# Patient Record
Sex: FEMALE | Race: BLACK OR AFRICAN AMERICAN | Employment: FULL TIME | URBAN - METROPOLITAN AREA
[De-identification: names, ages, dates, MRNs, and addresses within clinical notes are randomized per-mention and may not be internally consistent; named-entity substitution may affect disease eponyms.]

---

## 2017-01-13 ENCOUNTER — OFFICE VISIT (OUTPATIENT)
Dept: INTERNAL MEDICINE CLINIC | Age: 57
End: 2017-01-13

## 2017-01-13 VITALS
SYSTOLIC BLOOD PRESSURE: 166 MMHG | HEART RATE: 84 BPM | DIASTOLIC BLOOD PRESSURE: 130 MMHG | HEIGHT: 68 IN | BODY MASS INDEX: 44.41 KG/M2 | WEIGHT: 293 LBS

## 2017-01-13 DIAGNOSIS — E08.21 DIABETES MELLITUS DUE TO UNDERLYING CONDITION WITH DIABETIC NEPHROPATHY, WITH LONG-TERM CURRENT USE OF INSULIN (HCC): Primary | ICD-10-CM

## 2017-01-13 DIAGNOSIS — E78.5 HYPERLIPIDEMIA, UNSPECIFIED HYPERLIPIDEMIA TYPE: ICD-10-CM

## 2017-01-13 DIAGNOSIS — G89.4 CHRONIC PAIN SYNDROME: ICD-10-CM

## 2017-01-13 DIAGNOSIS — J20.9 ACUTE BRONCHITIS, UNSPECIFIED ORGANISM: ICD-10-CM

## 2017-01-13 DIAGNOSIS — Z79.4 DIABETES MELLITUS DUE TO UNDERLYING CONDITION WITH DIABETIC NEPHROPATHY, WITH LONG-TERM CURRENT USE OF INSULIN (HCC): Primary | ICD-10-CM

## 2017-01-13 DIAGNOSIS — I10 ESSENTIAL HYPERTENSION: ICD-10-CM

## 2017-01-13 DIAGNOSIS — J45.20 MILD INTERMITTENT ASTHMA WITHOUT COMPLICATION: ICD-10-CM

## 2017-01-13 PROCEDURE — 99204 OFFICE O/P NEW MOD 45 MIN: CPT | Performed by: INTERNAL MEDICINE

## 2017-01-13 RX ORDER — AMLODIPINE BESYLATE 10 MG/1
10 TABLET ORAL DAILY
Qty: 30 TABLET | Refills: 2 | Status: SHIPPED | OUTPATIENT
Start: 2017-01-13 | End: 2017-09-29 | Stop reason: SDUPTHER

## 2017-01-13 RX ORDER — AMOXICILLIN 500 MG/1
500 CAPSULE ORAL 3 TIMES DAILY
Qty: 30 CAPSULE | Refills: 0 | Status: SHIPPED | OUTPATIENT
Start: 2017-01-13 | End: 2017-01-23 | Stop reason: ALTCHOICE

## 2017-01-13 ASSESSMENT — ENCOUNTER SYMPTOMS
BACK PAIN: 1
ABDOMINAL PAIN: 0
NAUSEA: 0
VOMITING: 0
CHEST TIGHTNESS: 0
COUGH: 1
WHEEZING: 0
SHORTNESS OF BREATH: 0
COLOR CHANGE: 0

## 2017-01-13 ASSESSMENT — PATIENT HEALTH QUESTIONNAIRE - PHQ9
2. FEELING DOWN, DEPRESSED OR HOPELESS: 0
1. LITTLE INTEREST OR PLEASURE IN DOING THINGS: 0
SUM OF ALL RESPONSES TO PHQ9 QUESTIONS 1 & 2: 0
SUM OF ALL RESPONSES TO PHQ QUESTIONS 1-9: 0

## 2017-01-18 ENCOUNTER — OFFICE VISIT (OUTPATIENT)
Dept: INTERNAL MEDICINE | Age: 57
End: 2017-01-18

## 2017-01-20 ENCOUNTER — TELEPHONE (OUTPATIENT)
Dept: INTERNAL MEDICINE CLINIC | Age: 57
End: 2017-01-20

## 2017-01-23 ENCOUNTER — OFFICE VISIT (OUTPATIENT)
Dept: INTERNAL MEDICINE CLINIC | Age: 57
End: 2017-01-23

## 2017-01-23 VITALS
HEART RATE: 84 BPM | SYSTOLIC BLOOD PRESSURE: 150 MMHG | HEIGHT: 68 IN | DIASTOLIC BLOOD PRESSURE: 100 MMHG | WEIGHT: 293 LBS | BODY MASS INDEX: 44.41 KG/M2

## 2017-01-23 DIAGNOSIS — E08.21 DIABETES MELLITUS DUE TO UNDERLYING CONDITION WITH DIABETIC NEPHROPATHY, WITH LONG-TERM CURRENT USE OF INSULIN (HCC): ICD-10-CM

## 2017-01-23 DIAGNOSIS — I10 ESSENTIAL HYPERTENSION: ICD-10-CM

## 2017-01-23 DIAGNOSIS — M62.838 MUSCLE SPASMS OF BOTH LOWER EXTREMITIES: ICD-10-CM

## 2017-01-23 DIAGNOSIS — Z79.4 DIABETES MELLITUS DUE TO UNDERLYING CONDITION WITH DIABETIC NEPHROPATHY, WITH LONG-TERM CURRENT USE OF INSULIN (HCC): ICD-10-CM

## 2017-01-23 DIAGNOSIS — G89.4 CHRONIC PAIN SYNDROME: ICD-10-CM

## 2017-01-23 DIAGNOSIS — F51.01 PRIMARY INSOMNIA: Primary | ICD-10-CM

## 2017-01-23 DIAGNOSIS — M17.0 PRIMARY OSTEOARTHRITIS OF BOTH KNEES: ICD-10-CM

## 2017-01-23 LAB
ANION GAP SERPL CALCULATED.3IONS-SCNC: 19 MMOL/L (ref 3–16)
BUN BLDV-MCNC: 17 MG/DL (ref 7–20)
CALCIUM SERPL-MCNC: 9.5 MG/DL (ref 8.3–10.6)
CHLORIDE BLD-SCNC: 103 MMOL/L (ref 99–110)
CO2: 22 MMOL/L (ref 21–32)
CREAT SERPL-MCNC: 1 MG/DL (ref 0.6–1.1)
GFR AFRICAN AMERICAN: >60
GFR NON-AFRICAN AMERICAN: 57
GLUCOSE BLD-MCNC: 180 MG/DL (ref 70–99)
POTASSIUM SERPL-SCNC: 4.7 MMOL/L (ref 3.5–5.1)
SODIUM BLD-SCNC: 144 MMOL/L (ref 136–145)
TSH REFLEX: 0.89 UIU/ML (ref 0.27–4.2)

## 2017-01-23 PROCEDURE — 99213 OFFICE O/P EST LOW 20 MIN: CPT | Performed by: INTERNAL MEDICINE

## 2017-01-23 RX ORDER — CYCLOBENZAPRINE HCL 10 MG
10 TABLET ORAL EVERY 8 HOURS PRN
Qty: 90 TABLET | Refills: 0 | Status: SHIPPED | OUTPATIENT
Start: 2017-01-23 | End: 2017-03-29 | Stop reason: SDUPTHER

## 2017-01-23 RX ORDER — OXYCODONE AND ACETAMINOPHEN 10; 325 MG/1; MG/1
1 TABLET ORAL EVERY 6 HOURS PRN
Qty: 120 TABLET | Refills: 0 | Status: SHIPPED | OUTPATIENT
Start: 2017-01-23 | End: 2017-02-24 | Stop reason: HOSPADM

## 2017-01-23 RX ORDER — TEMAZEPAM 30 MG/1
30 CAPSULE ORAL NIGHTLY PRN
Qty: 30 CAPSULE | Refills: 0 | Status: CANCELLED | OUTPATIENT
Start: 2017-01-28

## 2017-01-23 ASSESSMENT — ENCOUNTER SYMPTOMS
WHEEZING: 0
COUGH: 0
SHORTNESS OF BREATH: 0

## 2017-01-23 ASSESSMENT — PATIENT HEALTH QUESTIONNAIRE - PHQ9
2. FEELING DOWN, DEPRESSED OR HOPELESS: 0
1. LITTLE INTEREST OR PLEASURE IN DOING THINGS: 0
SUM OF ALL RESPONSES TO PHQ QUESTIONS 1-9: 0
SUM OF ALL RESPONSES TO PHQ9 QUESTIONS 1 & 2: 0

## 2017-01-24 LAB
ESTIMATED AVERAGE GLUCOSE: 271.9 MG/DL
HBA1C MFR BLD: 11.1 %

## 2017-01-29 DIAGNOSIS — Z79.4 DIABETES MELLITUS DUE TO UNDERLYING CONDITION WITH DIABETIC NEPHROPATHY, WITH LONG-TERM CURRENT USE OF INSULIN (HCC): Primary | ICD-10-CM

## 2017-01-29 DIAGNOSIS — E08.21 DIABETES MELLITUS DUE TO UNDERLYING CONDITION WITH DIABETIC NEPHROPATHY, WITH LONG-TERM CURRENT USE OF INSULIN (HCC): Primary | ICD-10-CM

## 2017-01-29 LAB
6-ACETYLMORPHINE: NOT DETECTED
7-AMINOCLONAZEPAM: NOT DETECTED
ALPHA-OH-ALPRAZOLAM: NOT DETECTED
ALPRAZOLAM: NOT DETECTED
AMPHETAMINE: NOT DETECTED
BARBITURATES: NOT DETECTED
BENZOYLECGONINE: NOT DETECTED
BUPRENORPHINE: NOT DETECTED
CARISOPRODOL: NOT DETECTED
CLONAZEPAM: NOT DETECTED
CODEINE: NOT DETECTED
CREATININE URINE: >400 MG/DL (ref 20–400)
DIAZEPAM: NOT DETECTED
DRUGS EXPECTED: ABNORMAL
EER PAIN MGT DRUG PANEL, HIGH RES/EMIT U: ABNORMAL
ETHYL GLUCURONIDE: ABNORMAL
FENTANYL: NOT DETECTED
HYDROCODONE: NOT DETECTED
HYDROMORPHONE: NOT DETECTED
LORAZEPAM: NOT DETECTED
MARIJUANA METABOLITE: NOT DETECTED
MDA: NOT DETECTED
MDEA: NOT DETECTED
MDMA URINE: NOT DETECTED
MEPERIDINE: NOT DETECTED
METHADONE: NOT DETECTED
METHAMPHETAMINE: NOT DETECTED
METHYLPHENIDATE: NOT DETECTED
MIDAZOLAM: NOT DETECTED
MORPHINE: NOT DETECTED
NORBUPRENORPHINE, FREE: NOT DETECTED
NORDIAZEPAM: NOT DETECTED
NORFENTANYL: NOT DETECTED
NORHYDROCODONE, URINE: NOT DETECTED
NOROXYCODONE: PRESENT
NOROXYMORPHONE, URINE: PRESENT
OXAZEPAM: PRESENT
OXYCODONE: PRESENT
OXYMORPHONE: PRESENT
PAIN MANAGEMENT DRUG PANEL: ABNORMAL
PAIN MANAGEMENT DRUG PANEL: ABNORMAL
PCP: NOT DETECTED
PHENTERMINE: NOT DETECTED
PROPOXYPHENE: NOT DETECTED
TAPENTADOL, URINE: NOT DETECTED
TAPENTADOL-O-SULFATE, URINE: NOT DETECTED
TEMAZEPAM: PRESENT
TRAMADOL: NOT DETECTED
ZOLPIDEM: NOT DETECTED

## 2017-02-01 ENCOUNTER — TELEPHONE (OUTPATIENT)
Dept: INTERNAL MEDICINE CLINIC | Age: 57
End: 2017-02-01

## 2017-02-01 DIAGNOSIS — E11.40 TYPE 2 DIABETES MELLITUS WITH DIABETIC NEUROPATHY, WITH LONG-TERM CURRENT USE OF INSULIN (HCC): ICD-10-CM

## 2017-02-01 DIAGNOSIS — Z79.4 TYPE 2 DIABETES MELLITUS WITH DIABETIC NEUROPATHY, WITH LONG-TERM CURRENT USE OF INSULIN (HCC): ICD-10-CM

## 2017-02-01 RX ORDER — DULOXETINE 40 MG/1
40 CAPSULE, DELAYED RELEASE ORAL DAILY
Qty: 30 CAPSULE | Refills: 1 | Status: SHIPPED | OUTPATIENT
Start: 2017-02-01 | End: 2017-04-30 | Stop reason: SDUPTHER

## 2017-02-20 ENCOUNTER — TELEPHONE (OUTPATIENT)
Dept: INTERNAL MEDICINE CLINIC | Age: 57
End: 2017-02-20

## 2017-02-24 ENCOUNTER — OFFICE VISIT (OUTPATIENT)
Dept: INTERNAL MEDICINE CLINIC | Age: 57
End: 2017-02-24

## 2017-02-24 VITALS
HEART RATE: 80 BPM | SYSTOLIC BLOOD PRESSURE: 148 MMHG | HEIGHT: 68 IN | DIASTOLIC BLOOD PRESSURE: 102 MMHG | WEIGHT: 293 LBS | BODY MASS INDEX: 44.41 KG/M2

## 2017-02-24 DIAGNOSIS — I10 ESSENTIAL HYPERTENSION: ICD-10-CM

## 2017-02-24 DIAGNOSIS — J45.20 MILD INTERMITTENT ASTHMA WITHOUT COMPLICATION: ICD-10-CM

## 2017-02-24 DIAGNOSIS — F51.01 PRIMARY INSOMNIA: Primary | ICD-10-CM

## 2017-02-24 DIAGNOSIS — M17.0 PRIMARY OSTEOARTHRITIS OF BOTH KNEES: ICD-10-CM

## 2017-02-24 DIAGNOSIS — E11.40 TYPE 2 DIABETES MELLITUS WITH DIABETIC NEUROPATHY, WITH LONG-TERM CURRENT USE OF INSULIN (HCC): ICD-10-CM

## 2017-02-24 DIAGNOSIS — Z79.4 TYPE 2 DIABETES MELLITUS WITH DIABETIC NEUROPATHY, WITH LONG-TERM CURRENT USE OF INSULIN (HCC): ICD-10-CM

## 2017-02-24 PROCEDURE — 99214 OFFICE O/P EST MOD 30 MIN: CPT | Performed by: INTERNAL MEDICINE

## 2017-02-24 RX ORDER — ATORVASTATIN CALCIUM 40 MG/1
40 TABLET, FILM COATED ORAL NIGHTLY
Qty: 30 TABLET | Refills: 3 | Status: SHIPPED | OUTPATIENT
Start: 2017-02-24 | End: 2017-09-29 | Stop reason: SDUPTHER

## 2017-02-24 RX ORDER — OXYCODONE AND ACETAMINOPHEN 7.5; 325 MG/1; MG/1
1 TABLET ORAL EVERY 6 HOURS PRN
Qty: 120 TABLET | Refills: 0 | Status: SHIPPED | OUTPATIENT
Start: 2017-02-24 | End: 2017-03-26

## 2017-02-24 RX ORDER — BUSPIRONE HYDROCHLORIDE 15 MG/1
15 TABLET ORAL NIGHTLY
Qty: 30 TABLET | Refills: 2 | Status: SHIPPED | OUTPATIENT
Start: 2017-02-24 | End: 2017-03-29 | Stop reason: DRUGHIGH

## 2017-02-24 RX ORDER — ALBUTEROL SULFATE 90 UG/1
2 AEROSOL, METERED RESPIRATORY (INHALATION) EVERY 6 HOURS PRN
Qty: 1 INHALER | Refills: 3 | Status: SHIPPED | OUTPATIENT
Start: 2017-02-24 | End: 2017-09-29 | Stop reason: SDUPTHER

## 2017-02-24 RX ORDER — BENAZEPRIL HYDROCHLORIDE AND HYDROCHLOROTHIAZIDE 20; 12.5 MG/1; MG/1
1 TABLET ORAL DAILY
Qty: 30 TABLET | Refills: 3 | Status: SHIPPED | OUTPATIENT
Start: 2017-02-24 | End: 2017-09-29 | Stop reason: SDUPTHER

## 2017-02-24 ASSESSMENT — PATIENT HEALTH QUESTIONNAIRE - PHQ9
SUM OF ALL RESPONSES TO PHQ QUESTIONS 1-9: 0
2. FEELING DOWN, DEPRESSED OR HOPELESS: 0
1. LITTLE INTEREST OR PLEASURE IN DOING THINGS: 0
SUM OF ALL RESPONSES TO PHQ9 QUESTIONS 1 & 2: 0

## 2017-02-24 ASSESSMENT — ENCOUNTER SYMPTOMS
EYE REDNESS: 0
VOMITING: 0
WHEEZING: 0
SHORTNESS OF BREATH: 0
ABDOMINAL PAIN: 0
NAUSEA: 0

## 2017-03-02 ENCOUNTER — TELEPHONE (OUTPATIENT)
Dept: INTERNAL MEDICINE CLINIC | Age: 57
End: 2017-03-02

## 2017-03-02 DIAGNOSIS — M25.562 CHRONIC PAIN OF BOTH KNEES: Primary | ICD-10-CM

## 2017-03-02 DIAGNOSIS — G89.29 CHRONIC PAIN OF BOTH KNEES: Primary | ICD-10-CM

## 2017-03-02 DIAGNOSIS — M25.561 CHRONIC PAIN OF BOTH KNEES: Primary | ICD-10-CM

## 2017-03-07 ENCOUNTER — OFFICE VISIT (OUTPATIENT)
Dept: ENDOCRINOLOGY | Age: 57
End: 2017-03-07

## 2017-03-07 VITALS
HEART RATE: 110 BPM | OXYGEN SATURATION: 96 % | DIASTOLIC BLOOD PRESSURE: 82 MMHG | RESPIRATION RATE: 16 BRPM | BODY MASS INDEX: 50.88 KG/M2 | SYSTOLIC BLOOD PRESSURE: 145 MMHG | WEIGHT: 293 LBS

## 2017-03-07 DIAGNOSIS — I10 ESSENTIAL HYPERTENSION: ICD-10-CM

## 2017-03-07 LAB — HBA1C MFR BLD: 9.8 %

## 2017-03-07 PROCEDURE — 83036 HEMOGLOBIN GLYCOSYLATED A1C: CPT | Performed by: INTERNAL MEDICINE

## 2017-03-07 PROCEDURE — 99205 OFFICE O/P NEW HI 60 MIN: CPT | Performed by: INTERNAL MEDICINE

## 2017-03-27 ENCOUNTER — OFFICE VISIT (OUTPATIENT)
Dept: ORTHOPEDIC SURGERY | Age: 57
End: 2017-03-27

## 2017-03-27 VITALS
WEIGHT: 293 LBS | DIASTOLIC BLOOD PRESSURE: 93 MMHG | TEMPERATURE: 96.8 F | BODY MASS INDEX: 44.41 KG/M2 | HEIGHT: 68 IN | HEART RATE: 104 BPM | SYSTOLIC BLOOD PRESSURE: 147 MMHG

## 2017-03-27 DIAGNOSIS — M17.0 PRIMARY OSTEOARTHRITIS OF BOTH KNEES: Primary | ICD-10-CM

## 2017-03-27 PROCEDURE — 73562 X-RAY EXAM OF KNEE 3: CPT | Performed by: ORTHOPAEDIC SURGERY

## 2017-03-27 PROCEDURE — 99214 OFFICE O/P EST MOD 30 MIN: CPT | Performed by: ORTHOPAEDIC SURGERY

## 2017-03-29 ENCOUNTER — TELEPHONE (OUTPATIENT)
Dept: INTERNAL MEDICINE CLINIC | Age: 57
End: 2017-03-29

## 2017-03-29 DIAGNOSIS — M62.838 MUSCLE SPASMS OF BOTH LOWER EXTREMITIES: ICD-10-CM

## 2017-03-29 DIAGNOSIS — F51.01 PRIMARY INSOMNIA: ICD-10-CM

## 2017-03-29 RX ORDER — CYCLOBENZAPRINE HCL 10 MG
10 TABLET ORAL EVERY 8 HOURS PRN
Qty: 90 TABLET | Refills: 1 | Status: SHIPPED | OUTPATIENT
Start: 2017-03-29 | End: 2017-09-29 | Stop reason: SDUPTHER

## 2017-03-29 RX ORDER — BUSPIRONE HYDROCHLORIDE 30 MG/1
30 TABLET ORAL DAILY
Qty: 30 TABLET | Refills: 2 | Status: SHIPPED | OUTPATIENT
Start: 2017-03-29 | End: 2017-09-29 | Stop reason: ALTCHOICE

## 2017-04-07 ENCOUNTER — TELEPHONE (OUTPATIENT)
Dept: INTERNAL MEDICINE CLINIC | Age: 57
End: 2017-04-07

## 2017-04-30 DIAGNOSIS — E11.40 TYPE 2 DIABETES MELLITUS WITH DIABETIC NEUROPATHY, WITH LONG-TERM CURRENT USE OF INSULIN (HCC): ICD-10-CM

## 2017-04-30 DIAGNOSIS — Z79.4 TYPE 2 DIABETES MELLITUS WITH DIABETIC NEUROPATHY, WITH LONG-TERM CURRENT USE OF INSULIN (HCC): ICD-10-CM

## 2017-05-01 RX ORDER — DULOXETINE 40 MG/1
CAPSULE, DELAYED RELEASE ORAL
Qty: 30 CAPSULE | Refills: 1 | Status: SHIPPED | OUTPATIENT
Start: 2017-05-01 | End: 2017-08-11 | Stop reason: SDUPTHER

## 2017-07-05 ENCOUNTER — TELEPHONE (OUTPATIENT)
Dept: INTERNAL MEDICINE | Age: 57
End: 2017-07-05

## 2017-09-29 ENCOUNTER — OFFICE VISIT (OUTPATIENT)
Dept: INTERNAL MEDICINE CLINIC | Age: 57
End: 2017-09-29

## 2017-09-29 VITALS
SYSTOLIC BLOOD PRESSURE: 166 MMHG | BODY MASS INDEX: 48.96 KG/M2 | HEART RATE: 80 BPM | DIASTOLIC BLOOD PRESSURE: 102 MMHG | WEIGHT: 293 LBS

## 2017-09-29 DIAGNOSIS — R06.09 DYSPNEA ON EXERTION: ICD-10-CM

## 2017-09-29 DIAGNOSIS — M62.838 MUSCLE SPASMS OF BOTH LOWER EXTREMITIES: ICD-10-CM

## 2017-09-29 DIAGNOSIS — R07.89 CHEST DISCOMFORT: ICD-10-CM

## 2017-09-29 DIAGNOSIS — M19.90 ARTHRITIS: ICD-10-CM

## 2017-09-29 DIAGNOSIS — Z79.4 TYPE 2 DIABETES MELLITUS WITH DIABETIC NEUROPATHY, WITH LONG-TERM CURRENT USE OF INSULIN (HCC): Primary | ICD-10-CM

## 2017-09-29 DIAGNOSIS — I10 ESSENTIAL HYPERTENSION: ICD-10-CM

## 2017-09-29 DIAGNOSIS — E11.40 TYPE 2 DIABETES MELLITUS WITH DIABETIC NEUROPATHY, WITH LONG-TERM CURRENT USE OF INSULIN (HCC): Primary | ICD-10-CM

## 2017-09-29 DIAGNOSIS — Z79.4 TYPE 2 DIABETES MELLITUS WITH DIABETIC NEUROPATHY, WITH LONG-TERM CURRENT USE OF INSULIN (HCC): ICD-10-CM

## 2017-09-29 DIAGNOSIS — E11.40 TYPE 2 DIABETES MELLITUS WITH DIABETIC NEUROPATHY, WITH LONG-TERM CURRENT USE OF INSULIN (HCC): ICD-10-CM

## 2017-09-29 DIAGNOSIS — R06.00 DYSPNEA, UNSPECIFIED TYPE: ICD-10-CM

## 2017-09-29 DIAGNOSIS — R11.0 NAUSEA: ICD-10-CM

## 2017-09-29 LAB
A/G RATIO: 1.3 (ref 1.1–2.2)
ALBUMIN SERPL-MCNC: 4.1 G/DL (ref 3.4–5)
ALP BLD-CCNC: 121 U/L (ref 40–129)
ALT SERPL-CCNC: 10 U/L (ref 10–40)
ANION GAP SERPL CALCULATED.3IONS-SCNC: 16 MMOL/L (ref 3–16)
AST SERPL-CCNC: 14 U/L (ref 15–37)
BILIRUB SERPL-MCNC: <0.2 MG/DL (ref 0–1)
BUN BLDV-MCNC: 14 MG/DL (ref 7–20)
CALCIUM SERPL-MCNC: 9.2 MG/DL (ref 8.3–10.6)
CHLORIDE BLD-SCNC: 103 MMOL/L (ref 99–110)
CHOLESTEROL, TOTAL: 179 MG/DL (ref 0–199)
CO2: 25 MMOL/L (ref 21–32)
CREAT SERPL-MCNC: 0.8 MG/DL (ref 0.6–1.1)
GFR AFRICAN AMERICAN: >60
GFR NON-AFRICAN AMERICAN: >60
GLOBULIN: 3.1 G/DL
GLUCOSE BLD-MCNC: 223 MG/DL (ref 70–99)
HCT VFR BLD CALC: 35.6 % (ref 36–48)
HDLC SERPL-MCNC: 61 MG/DL (ref 40–60)
HEMOGLOBIN: 12 G/DL (ref 12–16)
LDL CHOLESTEROL CALCULATED: 85 MG/DL
MCH RBC QN AUTO: 28.9 PG (ref 26–34)
MCHC RBC AUTO-ENTMCNC: 33.8 G/DL (ref 31–36)
MCV RBC AUTO: 85.3 FL (ref 80–100)
PDW BLD-RTO: 15.6 % (ref 12.4–15.4)
PLATELET # BLD: 276 K/UL (ref 135–450)
PMV BLD AUTO: 8.6 FL (ref 5–10.5)
POTASSIUM SERPL-SCNC: 3.8 MMOL/L (ref 3.5–5.1)
RBC # BLD: 4.17 M/UL (ref 4–5.2)
SODIUM BLD-SCNC: 144 MMOL/L (ref 136–145)
TOTAL PROTEIN: 7.2 G/DL (ref 6.4–8.2)
TRIGL SERPL-MCNC: 165 MG/DL (ref 0–150)
VLDLC SERPL CALC-MCNC: 33 MG/DL
WBC # BLD: 7 K/UL (ref 4–11)

## 2017-09-29 PROCEDURE — 99214 OFFICE O/P EST MOD 30 MIN: CPT | Performed by: INTERNAL MEDICINE

## 2017-09-29 RX ORDER — AMLODIPINE BESYLATE 10 MG/1
10 TABLET ORAL DAILY
Qty: 30 TABLET | Refills: 2 | Status: SHIPPED | OUTPATIENT
Start: 2017-09-29 | End: 2018-02-10 | Stop reason: SDUPTHER

## 2017-09-29 RX ORDER — METOPROLOL SUCCINATE 100 MG/1
100 TABLET, EXTENDED RELEASE ORAL DAILY
Qty: 30 TABLET | Refills: 3 | Status: ON HOLD | OUTPATIENT
Start: 2017-09-29 | End: 2017-11-07 | Stop reason: HOSPADM

## 2017-09-29 RX ORDER — ATORVASTATIN CALCIUM 40 MG/1
40 TABLET, FILM COATED ORAL NIGHTLY
Qty: 30 TABLET | Refills: 3 | Status: SHIPPED | OUTPATIENT
Start: 2017-09-29 | End: 2018-03-29 | Stop reason: SDUPTHER

## 2017-09-29 RX ORDER — ALBUTEROL SULFATE 90 UG/1
2 AEROSOL, METERED RESPIRATORY (INHALATION) EVERY 6 HOURS PRN
Qty: 1 INHALER | Refills: 3 | Status: SHIPPED | OUTPATIENT
Start: 2017-09-29 | End: 2019-05-29 | Stop reason: SDUPTHER

## 2017-09-29 RX ORDER — BENAZEPRIL HYDROCHLORIDE AND HYDROCHLOROTHIAZIDE 20; 12.5 MG/1; MG/1
1 TABLET ORAL DAILY
Qty: 30 TABLET | Refills: 3 | Status: ON HOLD | OUTPATIENT
Start: 2017-09-29 | End: 2017-11-07 | Stop reason: HOSPADM

## 2017-09-29 RX ORDER — OMEPRAZOLE 20 MG/1
20 CAPSULE, DELAYED RELEASE ORAL DAILY
Qty: 30 CAPSULE | Refills: 3 | Status: SHIPPED | OUTPATIENT
Start: 2017-09-29 | End: 2018-02-20 | Stop reason: ALTCHOICE

## 2017-09-29 RX ORDER — CYCLOBENZAPRINE HCL 10 MG
10 TABLET ORAL EVERY 8 HOURS PRN
Qty: 90 TABLET | Refills: 1 | Status: SHIPPED | OUTPATIENT
Start: 2017-09-29 | End: 2018-02-05 | Stop reason: SDUPTHER

## 2017-09-29 RX ORDER — DULOXETINE 40 MG/1
CAPSULE, DELAYED RELEASE ORAL
Qty: 30 CAPSULE | Refills: 2 | Status: SHIPPED | OUTPATIENT
Start: 2017-09-29 | End: 2018-01-25 | Stop reason: SDUPTHER

## 2017-09-29 ASSESSMENT — ENCOUNTER SYMPTOMS
WHEEZING: 0
SHORTNESS OF BREATH: 0
NAUSEA: 0
EYE REDNESS: 0
ABDOMINAL PAIN: 0
VOMITING: 0

## 2017-09-30 LAB
ESTIMATED AVERAGE GLUCOSE: 257.5 MG/DL
HBA1C MFR BLD: 10.6 %

## 2017-10-02 DIAGNOSIS — E11.40 TYPE 2 DIABETES MELLITUS WITH DIABETIC NEUROPATHY, WITH LONG-TERM CURRENT USE OF INSULIN (HCC): ICD-10-CM

## 2017-10-02 DIAGNOSIS — Z79.4 DIABETES MELLITUS DUE TO UNDERLYING CONDITION WITH DIABETIC NEPHROPATHY, WITH LONG-TERM CURRENT USE OF INSULIN (HCC): Primary | ICD-10-CM

## 2017-10-02 DIAGNOSIS — Z79.4 TYPE 2 DIABETES MELLITUS WITH DIABETIC NEUROPATHY, WITH LONG-TERM CURRENT USE OF INSULIN (HCC): ICD-10-CM

## 2017-10-02 DIAGNOSIS — E08.21 DIABETES MELLITUS DUE TO UNDERLYING CONDITION WITH DIABETIC NEPHROPATHY, WITH LONG-TERM CURRENT USE OF INSULIN (HCC): Primary | ICD-10-CM

## 2017-10-06 DIAGNOSIS — E11.40 TYPE 2 DIABETES MELLITUS WITH DIABETIC NEUROPATHY, WITH LONG-TERM CURRENT USE OF INSULIN (HCC): ICD-10-CM

## 2017-10-06 DIAGNOSIS — Z79.4 TYPE 2 DIABETES MELLITUS WITH DIABETIC NEUROPATHY, WITH LONG-TERM CURRENT USE OF INSULIN (HCC): ICD-10-CM

## 2017-10-06 RX ORDER — INSULIN GLARGINE 100 [IU]/ML
INJECTION, SOLUTION SUBCUTANEOUS
Qty: 30 PEN | Refills: 1 | Status: SHIPPED | OUTPATIENT
Start: 2017-10-06 | End: 2018-02-05 | Stop reason: SDUPTHER

## 2017-10-10 DIAGNOSIS — Z79.4 TYPE 2 DIABETES MELLITUS WITHOUT COMPLICATION, WITH LONG-TERM CURRENT USE OF INSULIN (HCC): ICD-10-CM

## 2017-10-10 DIAGNOSIS — E11.9 TYPE 2 DIABETES MELLITUS WITHOUT COMPLICATION, WITH LONG-TERM CURRENT USE OF INSULIN (HCC): ICD-10-CM

## 2017-10-10 RX ORDER — BLOOD-GLUCOSE METER
1 KIT MISCELLANEOUS DAILY
Qty: 1 KIT | Refills: 0 | Status: SHIPPED | OUTPATIENT
Start: 2017-10-10 | End: 2022-02-16 | Stop reason: SDUPTHER

## 2017-10-12 ENCOUNTER — TELEPHONE (OUTPATIENT)
Dept: INTERNAL MEDICINE CLINIC | Age: 57
End: 2017-10-12

## 2017-10-13 RX ORDER — LANCETS 30 GAUGE
EACH MISCELLANEOUS
Qty: 300 EACH | Refills: 3 | Status: SHIPPED | OUTPATIENT
Start: 2017-10-13 | End: 2022-10-28 | Stop reason: SDUPTHER

## 2017-10-23 ENCOUNTER — TELEPHONE (OUTPATIENT)
Dept: RHEUMATOLOGY | Age: 57
End: 2017-10-23

## 2017-10-23 ENCOUNTER — TELEPHONE (OUTPATIENT)
Dept: INTERNAL MEDICINE CLINIC | Age: 57
End: 2017-10-23

## 2017-11-03 PROBLEM — I20.0 UNSTABLE ANGINA (HCC): Status: ACTIVE | Noted: 2017-11-03

## 2017-11-04 PROBLEM — R07.9 CHEST PAIN: Status: ACTIVE | Noted: 2017-11-03

## 2017-11-17 ENCOUNTER — OFFICE VISIT (OUTPATIENT)
Dept: CARDIOLOGY CLINIC | Age: 57
End: 2017-11-17

## 2017-11-17 VITALS
WEIGHT: 293 LBS | BODY MASS INDEX: 49.17 KG/M2 | SYSTOLIC BLOOD PRESSURE: 138 MMHG | DIASTOLIC BLOOD PRESSURE: 82 MMHG | HEART RATE: 96 BPM

## 2017-11-17 DIAGNOSIS — I10 ESSENTIAL HYPERTENSION: ICD-10-CM

## 2017-11-17 DIAGNOSIS — I25.10 CAD IN NATIVE ARTERY: ICD-10-CM

## 2017-11-17 DIAGNOSIS — I51.9 LV DYSFUNCTION: ICD-10-CM

## 2017-11-17 DIAGNOSIS — R07.89 OTHER CHEST PAIN: Primary | ICD-10-CM

## 2017-11-17 DIAGNOSIS — E78.1 HYPERTRIGLYCERIDEMIA: ICD-10-CM

## 2017-11-17 PROCEDURE — 99214 OFFICE O/P EST MOD 30 MIN: CPT | Performed by: NURSE PRACTITIONER

## 2017-11-17 RX ORDER — ISOSORBIDE MONONITRATE 60 MG/1
60 TABLET, EXTENDED RELEASE ORAL DAILY
Qty: 90 TABLET | Refills: 3 | Status: SHIPPED | OUTPATIENT
Start: 2017-11-17 | End: 2018-03-05 | Stop reason: SDUPTHER

## 2017-11-17 NOTE — PROGRESS NOTES
CC/HPI:  59 y.o. patient of Dr. [facility] with LV dysfunction,  non-obstructive CAD, HTN, HLD, and DM who is here for follow up after coronary angiogram. She continues to have non-radiating, exertional chest pain. Pain relieved with nitro. She denies sob, LH/dizziness, palpitations or syncope. No GI/ bleeding. Denies right groin pain. Past Medical History:   Diagnosis Date    Anxiety     Arthritis     Asthma     as child    Bilateral chronic knee pain     sees pain management    Degenerative disc disease, cervical MRI 2011    Multilevel cervical degenerative this disease most significant at the C7-T1 level where there is probable impingement of the exiting right C8 nerve root caused by disc herniation.  Depression     Diabetes     Randy Vang, NP at [facility]    High blood pressure     Hypertriglyceridemia     Lumbar herniated disc MRI 2011    L5-S1    Morbid obesity (HCC)     Opiate dependence ([address]     [facility]     Pneumonia     as child     No past surgical history on file. Family History   Problem Relation Age of Onset    Lung Cancer Mother 48     + tob    Osteoarthritis Other     Cancer Other     Diabetes Other     Hypertension Other     Stroke Other     Heart Disease Other     Diabetes Maternal Grandmother      Social History   Substance Use Topics    Smoking status: Never Smoker    Smokeless tobacco: Never Used    Alcohol use No     Allergies:Benadryl [diphenhydramine hcl] and Zolpidem tartrate [zolpidem tartrate er]    Review of Systems  General: No changes in weight, fatigue, or night sweats. HEENT: No blurry or decreased vision. No changes in hearing, nasal discharge or sore throat. Cardiovascular:  See HPI. Respiratory: No cough, hemoptysis, or wheezing. No history of asthma. Gastrointestinal:  No abdominal pain, hematochezia, melana, constipation, diarrhea, or history of GI ulcers. Genito-Urinary: No dysuria or hematuria.   No urgency or polyuria. Musculoskeletal:  Chronic back pain. Neurological:  No dizziness, headaches, numbness/tingling, speech problems or weakness. No history of a stroke or TIA. Psychological:  + anxiety  Hematological and Lymphatic: No abnormal bleeding or bruising, blood clots, jaundice or swollen lymph nodes. Endocrine:   No malaise/lethargy, palpitations, polydipsia/polyuria, temperature intolerance or unexpected weight changes. +DM  Skin:  No rashes or non-healing ulcers. Physical Exam:  /82   Pulse 96   Wt (!) 323 lb 6.4 oz (146.7 kg)   BMI 49.17 kg/m²    General:  Alert and oriented. No acute distress. Appears comfortable. HEENT:  Normocephalic. No trauma. EOMI. Neck:  Supple, no JVD  Cardiovascular:  RRR  Pulses: 2+ radial and right femoral   Lungs:  Clear to auscultation. No rales, wheezes, or rhonchi. Abd:  Soft, non-tender, non-distended. No peritoneal signs. obese  Ext:  No clubbing, cyanosis, or edema. Right groin soft, no hematoma  Neuro:  CN's 2-12 grossly in tact. Gait normal.  Motor and sensory exams grossly normal.  Skin:  No rashes or skin breakdown.     CBC:   Lab Results   Component Value Date    WBC 8.4 11/07/2017    HGB 11.0 (L) 11/07/2017    HCT 31.6 (L) 11/07/2017    MCV 83.3 11/07/2017     11/07/2017     BMP:  Lab Results   Component Value Date    CREATININE 0.8 11/07/2017    BUN 14 11/07/2017     11/07/2017    K 4.3 11/07/2017    CL 99 11/07/2017    CO2 30 11/07/2017     Mag:   Lab Results   Component Value Date    MG 1.90 11/07/2017     LIVER PROFILE:   Lab Results   Component Value Date    ALT 10 09/29/2017    AST 14 (L) 09/29/2017    ALKPHOS 121 09/29/2017    BILITOT <0.2 09/29/2017     PT/INR:   Lab Results   Component Value Date    INR 1.04 11/04/2017    INR 0.95 09/30/2013    PROTIME 11.7 11/04/2017    PROTIME 12.5 09/30/2013     BNP:  No results found for: BNP  LIPIDS:  No components found for: CHLPL  Lab Results   Component Value Date    TRIG 94 11/06/2017 TRIG 165 (H) 2017    TRIG 200 (H) 2016     Lab Results   Component Value Date    HDL 60 2017    HDL 61 (H) 2017    HDL 73 (H) 2016     Lab Results   Component Value Date    LDLCALC 42 2017    LDLCALC 85 2017    LDLCALC 74 2016     Lab Results   Component Value Date    LABVLDL 19 2017    LABVLDL 33 2017    LABVLDL 40 2016     TSH:No results found for: TSH, C4MUJFZ, E1XLDSM, THYROIDAB    IMAGIN/6/2017 Coronary angiogram  IFR negative for significant lesion in the LAD. There  was about 40-50% narrowing in the mid distal LAD. LV ejection  fraction 35-40% with distal inferior wall akinesia consistent with old  MI, global hypokinesia of left ventricle EF 35-40%, 1+ mitral  regurgitation on ventricular cineangiography. Left coronary dominance  RCA is normal codominant, does give trivial right posterior descending  Off.    2017 Coronary angiogram  Mid distal LAD with 40% stenosis. Apical LAD 50% stenosis  Left main 10% ostial stenosis  IFR 0.90 to mid distal LAD:  NO INTERVENTION indicated. Left cx dominant and normal  OMs normal.  Left PDA normal  RCA large and normal.     lvef 35-40% with inferoapical AK.      2017 Nuc stress:      Rest perfusion images demonstrate scarring at the anterior and the   inferior wall.  Stress perfusion images demonstrate ischemia   around the scar at the anterior and inferior wall. Mild ischemia   is also present at the apex.       The computer estimated resting left ventricular ejection fraction   is 32%. 2017 Echo:  Normal left ventricle size and wall thickness. Left ventricular function is   low normal with ejection fraction estimated at 50-55 %. The basal   inferolateral (posterior) wall appears hypokinetic. Diastolic filling   parameters suggests grade I diastolic dysfunction.   Trivial mitral regurgitation is present.       Medications:   Current Outpatient Prescriptions   Medication Sig Dispense Refill    isosorbide mononitrate (IMDUR) 60 MG extended release tablet Take 1 tablet by mouth daily 90 tablet 3    aspirin 81 MG chewable tablet Take 1 tablet by mouth daily 30 tablet 2    nitroGLYCERIN (NITROSTAT) 0.4 MG SL tablet up to max of 3 total doses. If no relief after 1 dose, call 911. 25 tablet 1    lisinopril-hydrochlorothiazide (PRINZIDE;ZESTORETIC) 20-12.5 MG per tablet Take 1 tablet by mouth daily 30 tablet 1    clopidogrel (PLAVIX) 75 MG tablet Take 1 tablet by mouth daily 30 tablet 2    metoprolol succinate (TOPROL XL) 50 MG extended release tablet Take 3 tablets by mouth daily 30 tablet 1    oxyCODONE-acetaminophen (PERCOCET) 7.5-325 MG per tablet Take 1 tablet by mouth 4 times daily .  UNABLE TO FIND Glucometer #1, E11.9, testing TID. ON INSULIN. Home pt.   DISPENSE METER BEST COVERED BY INSURANCE 1 each 0    Lancets MISC #300  DAYS, TESTING TID, E11.9 300 each 3    UNABLE TO FIND GLUCOSE TEST STRIPS, TESTING TID, ON INSULIN, E11.9 #300  DAYS  DISPENSE STRIPS BEST COVERED BY INSURANCE 300 each 3    glucose monitoring kit (FREESTYLE) monitoring kit 1 kit by Does not apply route daily 1 kit 0    Insulin Pen Needle 32G X 4 MM MISC Use to give insulin 4 x daily and with sliding scale 150 each 5    LANTUS SOLOSTAR 100 UNIT/ML injection pen INJECT 46 UNITS INTO THE SKIN 2 TIMES DAILY (Patient taking differently: INJECT 50 UNITS INTO THE SKIN 2 TIMES DAILY) 30 Pen 1    Dulaglutide (TRULICITY) 1.5 QQ/1.6UZ SOPN Inject 1.5 mg into the skin once a week Indications: Diabetes 4 Pen 5    DULoxetine HCl 40 MG CPEP TAKE 1 CAPSULE BY MOUTH DAILY 30 capsule 2    atorvastatin (LIPITOR) 40 MG tablet Take 1 tablet by mouth nightly 30 tablet 3    amLODIPine (NORVASC) 10 MG tablet Take 1 tablet by mouth daily 30 tablet 2    metFORMIN (GLUCOPHAGE) 1000 MG tablet Take 1 tablet by mouth 2 times daily (with meals) 60 tablet 2    albuterol sulfate HFA (PROAIR HFA) 108 (90 Base) MCG/ACT inhaler Inhale 2 puffs into the lungs every 6 hours as needed for Wheezing 1 Inhaler 3    omeprazole (PRILOSEC) 20 MG delayed release capsule Take 1 capsule by mouth daily 30 capsule 3    cyclobenzaprine (FLEXERIL) 10 MG tablet Take 1 tablet by mouth every 8 hours as needed for Muscle spasms 90 tablet 1    insulin lispro (HUMALOG KWIKPEN) 100 UNIT/ML pen Inject 10 Units into the skin 3 times daily (before meals) plus sliding scale. Max daily dose 90 units. 9 Pen 3    glucose blood VI test strips (FREESTYLE LITE) strip 1 each by In Vitro route daily As needed. 100 each 3    Lancet Devices (ACCU-CHEK SOFTCLIX LANCET DEV) MISC 1 Units by Does not apply route 4 times daily (before meals and nightly) 100 each 3    SOFT TOUCH LANCETS MISC 1 strip by Does not apply route 4 times daily 100 each 3     No current facility-administered medications for this visit. Assessment:  1. Other chest pain    2. LV dysfunction    3. Essential hypertension    4. CAD in native artery    5.  Hypertriglyceridemia        Plan:  -Increase imdur to 60 mg po daily  -Cont

## 2017-11-28 ENCOUNTER — OFFICE VISIT (OUTPATIENT)
Dept: INTERNAL MEDICINE CLINIC | Age: 57
End: 2017-11-28

## 2017-11-28 VITALS
WEIGHT: 293 LBS | DIASTOLIC BLOOD PRESSURE: 88 MMHG | SYSTOLIC BLOOD PRESSURE: 138 MMHG | HEIGHT: 68 IN | HEART RATE: 80 BPM | BODY MASS INDEX: 44.41 KG/M2

## 2017-11-28 DIAGNOSIS — I25.10 CORONARY ARTERY DISEASE INVOLVING NATIVE CORONARY ARTERY OF NATIVE HEART WITHOUT ANGINA PECTORIS: ICD-10-CM

## 2017-11-28 DIAGNOSIS — Z01.818 PRE-OP EXAM: Primary | ICD-10-CM

## 2017-11-28 DIAGNOSIS — Z79.4 TYPE 2 DIABETES MELLITUS WITH DIABETIC NEUROPATHY, WITH LONG-TERM CURRENT USE OF INSULIN (HCC): ICD-10-CM

## 2017-11-28 DIAGNOSIS — I10 ESSENTIAL HYPERTENSION: ICD-10-CM

## 2017-11-28 DIAGNOSIS — E11.40 TYPE 2 DIABETES MELLITUS WITH DIABETIC NEUROPATHY, WITH LONG-TERM CURRENT USE OF INSULIN (HCC): ICD-10-CM

## 2017-11-28 DIAGNOSIS — H25.9 AGE-RELATED CATARACT OF BOTH EYES, UNSPECIFIED AGE-RELATED CATARACT TYPE: ICD-10-CM

## 2017-11-28 LAB
A/G RATIO: 1.4 (ref 1.1–2.2)
ALBUMIN SERPL-MCNC: 4.2 G/DL (ref 3.4–5)
ALP BLD-CCNC: 105 U/L (ref 40–129)
ALT SERPL-CCNC: 8 U/L (ref 10–40)
ANION GAP SERPL CALCULATED.3IONS-SCNC: 14 MMOL/L (ref 3–16)
AST SERPL-CCNC: 13 U/L (ref 15–37)
BILIRUB SERPL-MCNC: 0.3 MG/DL (ref 0–1)
BUN BLDV-MCNC: 14 MG/DL (ref 7–20)
CALCIUM SERPL-MCNC: 9.2 MG/DL (ref 8.3–10.6)
CHLORIDE BLD-SCNC: 100 MMOL/L (ref 99–110)
CO2: 28 MMOL/L (ref 21–32)
CREAT SERPL-MCNC: 0.9 MG/DL (ref 0.6–1.1)
CREATININE URINE: 165.8 MG/DL (ref 28–259)
GFR AFRICAN AMERICAN: >60
GFR NON-AFRICAN AMERICAN: >60
GLOBULIN: 3 G/DL
GLUCOSE BLD-MCNC: 169 MG/DL (ref 70–99)
HCT VFR BLD CALC: 33.3 % (ref 36–48)
HEMOGLOBIN: 11.2 G/DL (ref 12–16)
MCH RBC QN AUTO: 28.8 PG (ref 26–34)
MCHC RBC AUTO-ENTMCNC: 33.5 G/DL (ref 31–36)
MCV RBC AUTO: 85.9 FL (ref 80–100)
MICROALBUMIN UR-MCNC: <1.2 MG/DL
MICROALBUMIN/CREAT UR-RTO: NORMAL MG/G (ref 0–30)
PDW BLD-RTO: 15.5 % (ref 12.4–15.4)
PLATELET # BLD: 263 K/UL (ref 135–450)
PMV BLD AUTO: 8 FL (ref 5–10.5)
POTASSIUM SERPL-SCNC: 3.9 MMOL/L (ref 3.5–5.1)
RBC # BLD: 3.87 M/UL (ref 4–5.2)
SODIUM BLD-SCNC: 142 MMOL/L (ref 136–145)
TOTAL PROTEIN: 7.2 G/DL (ref 6.4–8.2)
WBC # BLD: 6.8 K/UL (ref 4–11)

## 2017-11-28 PROCEDURE — 99243 OFF/OP CNSLTJ NEW/EST LOW 30: CPT | Performed by: INTERNAL MEDICINE

## 2017-11-28 NOTE — PROGRESS NOTES
Preoperative Consultation      Renetta Calderon  YOB: 1960    Date of Service:  11/28/2017    Vitals:    11/28/17 0933 11/28/17 0947   BP: (!) 146/98 138/88   Site: Left Arm    Position: Sitting    Pulse: 80    Weight: (!) 323 lb (146.5 kg)    Height: 5' 8\" (1.727 m)       Wt Readings from Last 2 Encounters:   11/28/17 (!) 323 lb (146.5 kg)   11/17/17 (!) 323 lb 6.4 oz (146.7 kg)     BP Readings from Last 3 Encounters:   11/28/17 138/88   11/17/17 138/82   11/07/17 (!) 148/85        Chief Complaint   Patient presents with   Nafisa Garcia. Cataract Surgery. Both eye. The right one first.      Allergies   Allergen Reactions    Benadryl [Diphenhydramine Hcl] Other (See Comments)     Jittery feeling    Zolpidem Tartrate [Zolpidem Tartrate Er] Other (See Comments)     Jittery feeling     Outpatient Prescriptions Marked as Taking for the 11/28/17 encounter (Office Visit) with Shane Busby MD   Medication Sig Dispense Refill    isosorbide mononitrate (IMDUR) 60 MG extended release tablet Take 1 tablet by mouth daily 90 tablet 3    aspirin 81 MG chewable tablet Take 1 tablet by mouth daily 30 tablet 2    nitroGLYCERIN (NITROSTAT) 0.4 MG SL tablet up to max of 3 total doses. If no relief after 1 dose, call 911. 25 tablet 1    lisinopril-hydrochlorothiazide (PRINZIDE;ZESTORETIC) 20-12.5 MG per tablet Take 1 tablet by mouth daily 30 tablet 1    clopidogrel (PLAVIX) 75 MG tablet Take 1 tablet by mouth daily 30 tablet 2    metoprolol succinate (TOPROL XL) 50 MG extended release tablet Take 3 tablets by mouth daily 30 tablet 1    oxyCODONE-acetaminophen (PERCOCET) 7.5-325 MG per tablet Take 1 tablet by mouth 4 times daily .  UNABLE TO FIND Glucometer #1, E11.9, testing TID. ON INSULIN. Home pt.   DISPENSE METER BEST COVERED BY INSURANCE 1 each 0    Lancets MISC #300  DAYS, TESTING TID, E11.9 300 each 3    UNABLE TO FIND GLUCOSE TEST STRIPS, TESTING TID, ON INSULIN, E11.9 Active Problem List   Diagnosis    OM (osteomyelitis) ([address]    Arthritis    HTN (hypertension)    DM (diabetes mellitus) ([address]    Osteomyelitis of spine ([address]    Diskitis    Opiate analgesic use agreement exists    Type 2 diabetes mellitus with diabetic neuropathy (HCC)    Insomnia    Submucous leiomyoma of uterus    PMB (postmenopausal bleeding)    Adiposity    Osteoarthritis of hand    Urticaria    Muscle spasms of both lower extremities    Mild intermittent asthma without complication    Chest pain    Abnormal EKG    Essential hypertension    Age-related cataract of both eyes       Past Medical History:   Diagnosis Date    Anxiety     Arthritis     Asthma     as child    Bilateral chronic knee pain     sees pain management    Degenerative disc disease, cervical MRI 2011    Multilevel cervical degenerative this disease most significant at the C7-T1 level where there is probable impingement of the exiting right C8 nerve root caused by disc herniation.  Depression     Diabetes     Rodrigo Rush, FELICIA at [facility]    High blood pressure     Hypertriglyceridemia     Lumbar herniated disc MRI 2011    L5-S1    Morbid obesity (HCC)     Opiate dependence ([address]     Osteomyelitis ([address]     Pneumonia     as child     No past surgical history on file.   Family History   Problem Relation Age of Onset    Lung Cancer Mother 48     + tob    Osteoarthritis Other     Cancer Other     Diabetes Other     Hypertension Other     Stroke Other     Heart Disease Other     Diabetes Maternal Grandmother      Social History     Social History    Marital status:      Spouse name: N/A    Number of children: 3    Years of education: N/A     Occupational History    /Masters      [employer]     Social History Main Topics    Smoking status: Never Smoker    Smokeless tobacco: Never Used    Alcohol use No    Drug use: No    Sexual activity: Not on file     Other Topics

## 2017-11-29 LAB
ESTIMATED AVERAGE GLUCOSE: 231.7 MG/DL
HBA1C MFR BLD: 9.7 %

## 2017-12-13 ENCOUNTER — TELEPHONE (OUTPATIENT)
Dept: CARDIOLOGY CLINIC | Age: 57
End: 2017-12-13

## 2017-12-13 NOTE — TELEPHONE ENCOUNTER
Bree called to request a nurse call her back. She has an appointment with Maryuri Bauman on 1/24/17 which is a post cath follow up. She has been taking NitroGLYCERIN everyday when she starts to feel sweaty and not feeling good. She wants to know is this what she should be doing.  [phone]

## 2017-12-13 NOTE — TELEPHONE ENCOUNTER
Pt called back stating that she is having eposides of profusely sweating during activity. Pt denies chest pain. She states that she is finding relief by utilizing nitro. Pt stated that she is using so much nitro that she needs a refill. Educated pt on use of nitro, pt verbalized understanding. Follow up made with Dr. Maryuri Bauman for 12/18/17. Informed pt that if she is having chest pain she should go to the ER. Pt verbalized understanding on all instructions.

## 2017-12-18 ENCOUNTER — OFFICE VISIT (OUTPATIENT)
Dept: CARDIOLOGY CLINIC | Age: 57
End: 2017-12-18

## 2017-12-18 VITALS
HEART RATE: 78 BPM | WEIGHT: 293 LBS | SYSTOLIC BLOOD PRESSURE: 128 MMHG | BODY MASS INDEX: 50.02 KG/M2 | DIASTOLIC BLOOD PRESSURE: 80 MMHG

## 2017-12-18 DIAGNOSIS — R94.31 ABNORMAL EKG: ICD-10-CM

## 2017-12-18 DIAGNOSIS — I10 ESSENTIAL HYPERTENSION: Primary | ICD-10-CM

## 2017-12-18 DIAGNOSIS — I25.10 CORONARY ARTERY DISEASE INVOLVING NATIVE HEART, ANGINA PRESENCE UNSPECIFIED, UNSPECIFIED VESSEL OR LESION TYPE: ICD-10-CM

## 2017-12-18 PROCEDURE — 99214 OFFICE O/P EST MOD 30 MIN: CPT | Performed by: INTERNAL MEDICINE

## 2017-12-18 PROCEDURE — 93000 ELECTROCARDIOGRAM COMPLETE: CPT | Performed by: INTERNAL MEDICINE

## 2017-12-18 RX ORDER — FUROSEMIDE 20 MG/1
20 TABLET ORAL DAILY
Qty: 60 TABLET | Refills: 3 | Status: SHIPPED | OUTPATIENT
Start: 2017-12-18 | End: 2018-02-20 | Stop reason: ALTCHOICE

## 2017-12-18 RX ORDER — POTASSIUM CHLORIDE 1.5 G/1.77G
20 POWDER, FOR SOLUTION ORAL DAILY
Qty: 30 EACH | Refills: 3 | Status: SHIPPED | OUTPATIENT
Start: 2017-12-18 | End: 2018-03-20

## 2017-12-18 ASSESSMENT — ENCOUNTER SYMPTOMS
SHORTNESS OF BREATH: 1
GASTROINTESTINAL NEGATIVE: 1
EYES NEGATIVE: 1
ALLERGIC/IMMUNOLOGIC NEGATIVE: 1

## 2017-12-18 NOTE — PROGRESS NOTES
Submucous leiomyoma of uterus    PMB (postmenopausal bleeding)    Adiposity    Osteoarthritis of hand    Urticaria    Muscle spasms of both lower extremities    Mild intermittent asthma without complication    Chest pain    Abnormal EKG    Essential hypertension    Age-related cataract of both eyes           Plan:      CAD; Not critical at angiogram but medical therapy is limited as she sweats with exertion but has no chest pain. She is diabetic. Told to take imdur 30 mg bid morning and early PM instead of 2 at same time. Add lasix 20 mg daily and K 20 meq daily. Continue other medications. 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 LVEF 32% on MPI recently.

## 2018-01-09 ENCOUNTER — CARE COORDINATION (OUTPATIENT)
Dept: CARE COORDINATION | Age: 58
End: 2018-01-09

## 2018-01-09 NOTE — LETTER
1/9/2018    Shanthi Gusman  [address]  [address]  [facility] have been selected by your primary care provider to participate in a Care Coordination Program that provides additional support and resources to provide a higher level of care to our patients. One of those resources is a Nurse Care Coordinator, Allana Sacks who can offer support in managing the health of our patients who have chronic health conditions, multiple health conditions, and or complex health needs. Examples of the types of support Allana Sacks, RN will provide include service coordination (finding providers in your network and community, assistance in scheduling services, ensuring test results are available, etc), education, and promoting your ability to follow your doctor's recommended treatment plan. You have been selected to take part in this unique program that will include one on one interaction with Allana Sacks, RN. Allana Sacks, RN will work with you following some of your appointments with me in our office. She/He will also contact you via phone to help you learn and understand how to manage your health and work towards your chosen health goals. I hope that you will be as excited by this program as we are. Allana Sacks, RN will be contacting you in the next week to speak with you regarding your plan of care.     Sincerely,     Ray Sinclair MD

## 2018-01-15 ENCOUNTER — OFFICE VISIT (OUTPATIENT)
Dept: CARDIOLOGY CLINIC | Age: 58
End: 2018-01-15

## 2018-01-15 VITALS
HEART RATE: 100 BPM | WEIGHT: 293 LBS | SYSTOLIC BLOOD PRESSURE: 140 MMHG | BODY MASS INDEX: 48.63 KG/M2 | DIASTOLIC BLOOD PRESSURE: 80 MMHG

## 2018-01-15 DIAGNOSIS — R94.31 ABNORMAL EKG: Primary | ICD-10-CM

## 2018-01-15 PROCEDURE — 99213 OFFICE O/P EST LOW 20 MIN: CPT | Performed by: INTERNAL MEDICINE

## 2018-01-15 RX ORDER — LISINOPRIL AND HYDROCHLOROTHIAZIDE 20; 12.5 MG/1; MG/1
1 TABLET ORAL DAILY
Qty: 30 TABLET | Refills: 5 | Status: SHIPPED | OUTPATIENT
Start: 2018-01-15 | End: 2018-03-29 | Stop reason: SDUPTHER

## 2018-01-15 ASSESSMENT — ENCOUNTER SYMPTOMS
EYES NEGATIVE: 1
SHORTNESS OF BREATH: 1
GASTROINTESTINAL NEGATIVE: 1
ALLERGIC/IMMUNOLOGIC NEGATIVE: 1

## 2018-01-16 ENCOUNTER — CARE COORDINATION (OUTPATIENT)
Dept: CARE COORDINATION | Age: 58
End: 2018-01-16

## 2018-01-16 NOTE — CARE COORDINATION
Made first f/u call to pt following [facility] intro letter. Left detailed msg. Will reach out again in 2 weeks.

## 2018-01-25 DIAGNOSIS — Z79.4 TYPE 2 DIABETES MELLITUS WITH DIABETIC NEUROPATHY, WITH LONG-TERM CURRENT USE OF INSULIN (HCC): ICD-10-CM

## 2018-01-25 DIAGNOSIS — E11.40 TYPE 2 DIABETES MELLITUS WITH DIABETIC NEUROPATHY, WITH LONG-TERM CURRENT USE OF INSULIN (HCC): ICD-10-CM

## 2018-01-26 RX ORDER — DULOXETINE 40 MG/1
CAPSULE, DELAYED RELEASE ORAL
Qty: 30 CAPSULE | Refills: 2 | Status: SHIPPED | OUTPATIENT
Start: 2018-01-26 | End: 2018-02-05 | Stop reason: DRUGHIGH

## 2018-01-30 ENCOUNTER — CARE COORDINATION (OUTPATIENT)
Dept: CARE COORDINATION | Age: 58
End: 2018-01-30

## 2018-01-31 ENCOUNTER — CARE COORDINATION (OUTPATIENT)
Dept: CARE COORDINATION | Age: 58
End: 2018-01-31

## 2018-02-05 ENCOUNTER — CARE COORDINATOR VISIT (OUTPATIENT)
Dept: CARE COORDINATION | Age: 58
End: 2018-02-05

## 2018-02-05 ENCOUNTER — OFFICE VISIT (OUTPATIENT)
Dept: INTERNAL MEDICINE CLINIC | Age: 58
End: 2018-02-05

## 2018-02-05 VITALS
HEART RATE: 76 BPM | HEIGHT: 68 IN | SYSTOLIC BLOOD PRESSURE: 126 MMHG | BODY MASS INDEX: 44.41 KG/M2 | WEIGHT: 293 LBS | DIASTOLIC BLOOD PRESSURE: 88 MMHG

## 2018-02-05 DIAGNOSIS — E78.01 FAMILIAL HYPERCHOLESTEROLEMIA: ICD-10-CM

## 2018-02-05 DIAGNOSIS — Z79.4 DIABETES MELLITUS DUE TO UNDERLYING CONDITION WITH DIABETIC NEPHROPATHY, WITH LONG-TERM CURRENT USE OF INSULIN (HCC): ICD-10-CM

## 2018-02-05 DIAGNOSIS — Z79.4 DIABETES MELLITUS DUE TO UNDERLYING CONDITION WITH DIABETIC NEPHROPATHY, WITH LONG-TERM CURRENT USE OF INSULIN (HCC): Primary | ICD-10-CM

## 2018-02-05 DIAGNOSIS — M62.838 MUSCLE SPASMS OF BOTH LOWER EXTREMITIES: ICD-10-CM

## 2018-02-05 DIAGNOSIS — Z79.4 TYPE 2 DIABETES MELLITUS WITH DIABETIC NEUROPATHY, WITH LONG-TERM CURRENT USE OF INSULIN (HCC): ICD-10-CM

## 2018-02-05 DIAGNOSIS — I10 ESSENTIAL HYPERTENSION: ICD-10-CM

## 2018-02-05 DIAGNOSIS — F32.A DEPRESSION, UNSPECIFIED DEPRESSION TYPE: ICD-10-CM

## 2018-02-05 DIAGNOSIS — E08.21 DIABETES MELLITUS DUE TO UNDERLYING CONDITION WITH DIABETIC NEPHROPATHY, WITH LONG-TERM CURRENT USE OF INSULIN (HCC): ICD-10-CM

## 2018-02-05 DIAGNOSIS — E11.40 TYPE 2 DIABETES MELLITUS WITH DIABETIC NEUROPATHY, WITH LONG-TERM CURRENT USE OF INSULIN (HCC): ICD-10-CM

## 2018-02-05 DIAGNOSIS — E08.21 DIABETES MELLITUS DUE TO UNDERLYING CONDITION WITH DIABETIC NEPHROPATHY, WITH LONG-TERM CURRENT USE OF INSULIN (HCC): Primary | ICD-10-CM

## 2018-02-05 PROCEDURE — 99214 OFFICE O/P EST MOD 30 MIN: CPT | Performed by: INTERNAL MEDICINE

## 2018-02-05 RX ORDER — DULOXETIN HYDROCHLORIDE 60 MG/1
60 CAPSULE, DELAYED RELEASE ORAL DAILY
Qty: 30 CAPSULE | Refills: 2 | Status: SHIPPED | OUTPATIENT
Start: 2018-02-05 | End: 2018-10-07

## 2018-02-05 RX ORDER — CYCLOBENZAPRINE HCL 10 MG
10 TABLET ORAL EVERY 8 HOURS PRN
Qty: 90 TABLET | Refills: 1 | Status: SHIPPED | OUTPATIENT
Start: 2018-02-05 | End: 2018-10-07

## 2018-02-05 ASSESSMENT — ENCOUNTER SYMPTOMS
ABDOMINAL PAIN: 0
VOMITING: 0
NAUSEA: 0
SHORTNESS OF BREATH: 0

## 2018-02-06 LAB
ESTIMATED AVERAGE GLUCOSE: 306.3 MG/DL
HBA1C MFR BLD: 12.3 %

## 2018-02-12 DIAGNOSIS — F43.9 STRESS: Primary | ICD-10-CM

## 2018-02-12 DIAGNOSIS — E11.40 TYPE 2 DIABETES MELLITUS WITH DIABETIC NEUROPATHY, WITH LONG-TERM CURRENT USE OF INSULIN (HCC): ICD-10-CM

## 2018-02-12 DIAGNOSIS — Z79.4 TYPE 2 DIABETES MELLITUS WITH DIABETIC NEUROPATHY, WITH LONG-TERM CURRENT USE OF INSULIN (HCC): ICD-10-CM

## 2018-02-13 ENCOUNTER — CARE COORDINATION (OUTPATIENT)
Dept: CARE COORDINATION | Age: 58
End: 2018-02-13

## 2018-02-13 NOTE — CARE COORDINATION
[facility] attempted to make ED f/u call, was unable to [facility] d/t mailbox was full. Will attempt to reach pt again tomorrow.

## 2018-02-16 ENCOUNTER — TELEPHONE (OUTPATIENT)
Dept: INTERNAL MEDICINE CLINIC | Age: 58
End: 2018-02-16

## 2018-02-16 RX ORDER — NITROGLYCERIN 0.4 MG/1
0.4 TABLET SUBLINGUAL EVERY 5 MIN PRN
Qty: 25 TABLET | Refills: 3 | Status: SHIPPED | OUTPATIENT
Start: 2018-02-16 | End: 2019-04-15 | Stop reason: SDUPTHER

## 2018-02-19 ENCOUNTER — TELEPHONE (OUTPATIENT)
Dept: INTERNAL MEDICINE CLINIC | Age: 58
End: 2018-02-19

## 2018-02-20 ENCOUNTER — OFFICE VISIT (OUTPATIENT)
Dept: INTERNAL MEDICINE CLINIC | Age: 58
End: 2018-02-20

## 2018-02-20 VITALS
SYSTOLIC BLOOD PRESSURE: 150 MMHG | HEART RATE: 110 BPM | BODY MASS INDEX: 46.5 KG/M2 | DIASTOLIC BLOOD PRESSURE: 100 MMHG | TEMPERATURE: 98.7 F | WEIGHT: 293 LBS

## 2018-02-20 DIAGNOSIS — R07.9 CHEST PAIN, UNSPECIFIED TYPE: Primary | ICD-10-CM

## 2018-02-20 DIAGNOSIS — F33.41 RECURRENT MAJOR DEPRESSIVE DISORDER, IN PARTIAL REMISSION (HCC): ICD-10-CM

## 2018-02-20 DIAGNOSIS — J20.9 ACUTE BRONCHITIS, UNSPECIFIED ORGANISM: ICD-10-CM

## 2018-02-20 DIAGNOSIS — R09.1 PLEURISY: ICD-10-CM

## 2018-02-20 PROBLEM — J18.9 PNEUMONIA: Status: ACTIVE | Noted: 2018-02-20

## 2018-02-20 LAB
GLUCOSE BLD-MCNC: 467 MG/DL
INFLUENZA A ANTIBODY: NORMAL
INFLUENZA B ANTIBODY: NORMAL

## 2018-02-20 PROCEDURE — 93000 ELECTROCARDIOGRAM COMPLETE: CPT | Performed by: INTERNAL MEDICINE

## 2018-02-20 PROCEDURE — 87804 INFLUENZA ASSAY W/OPTIC: CPT | Performed by: INTERNAL MEDICINE

## 2018-02-20 PROCEDURE — 82962 GLUCOSE BLOOD TEST: CPT | Performed by: INTERNAL MEDICINE

## 2018-02-20 PROCEDURE — 99214 OFFICE O/P EST MOD 30 MIN: CPT | Performed by: INTERNAL MEDICINE

## 2018-02-20 RX ORDER — PREDNISONE 20 MG/1
40 TABLET ORAL DAILY
Qty: 10 TABLET | Refills: 0 | Status: SHIPPED | OUTPATIENT
Start: 2018-02-20 | End: 2018-02-20 | Stop reason: HOSPADM

## 2018-02-20 RX ORDER — LEVOFLOXACIN 500 MG/1
500 TABLET, FILM COATED ORAL DAILY
Qty: 10 TABLET | Refills: 0 | Status: SHIPPED | OUTPATIENT
Start: 2018-02-20 | End: 2018-02-28 | Stop reason: SDUPTHER

## 2018-02-20 ASSESSMENT — ENCOUNTER SYMPTOMS
COUGH: 1
NAUSEA: 0
SHORTNESS OF BREATH: 1
ABDOMINAL PAIN: 0
VOMITING: 0

## 2018-02-20 NOTE — PROGRESS NOTES
Subjective:      Chief Complaint   Patient presents with    Cough     Chest pain due to cough. Started last week with cough. Productive. Pt has not taken temp. Pt has been taking Alkiseltzer cold plus. Patient ID: Naeem Sheikh is a 59 y.o. female. HPI  Patient is complaining of cough congestion and anterior chest pain was getting worse with deep breathing for the last 4 days. Also complaining of some shortness of breath and using albuterol inhaler. She also noticed profuse sweating today. Her blood sugar has been running from 100 to 400. After the initial exam in the office she found to have pulse rate of 110. On further questioning she does admit that there is chest pressure. Patient does have history of coronary artery disease and has been under medical management. She have history of depression. Current PHQ 9 score  is 10. She has been on Cymbalta. She is interested to see a psychologist    Review of Systems   Constitutional: Positive for diaphoresis and fatigue. Respiratory: Positive for cough and shortness of breath. Cardiovascular: Positive for chest pain. Negative for palpitations and leg swelling. Gastrointestinal: Negative for abdominal pain, nausea and vomiting. Neurological: Negative for dizziness and light-headedness. Psychiatric/Behavioral: The patient is nervous/anxious. Vitals:    02/20/18 0808   BP: (!) 150/100   Pulse: 110   Temp: 98.7 °F (37.1 °C)         Objective:   Physical Exam   Constitutional:   Obese. Profuse sweating   Eyes: Conjunctivae are normal. No scleral icterus. Neck: Normal range of motion. Neck supple. Cardiovascular: Normal rate and regular rhythm. /m   Pulmonary/Chest: Effort normal and breath sounds normal.   Few scattered rales at the both lungs field   Abdominal: Soft. Bowel sounds are normal.   Lymphadenopathy:     She has no cervical adenopathy. Neurological: She is alert. Skin: She is diaphoretic. Psychiatric: She has a normal mood and affect. Thought content normal.   Nursing note and vitals reviewed. EKG: Sinus tachycardia. Ventricular rate of 114/m. nonspecific T-wave changes    Blood glucose 467  Flu test is negative. Assessment/Plan:  Mary Carmen Forrester was seen today for cough. Diagnoses and all orders for this visit:    Chest pain, unspecified type. Considering patient underlying coronary artery disease, uncontrolled diabetes as well as pleuritic chest pain and tachycardia, chest pain etiology cannot be uncertain. Patient will be transferred to the emergency room. Discussed this case with the ER physician Dr. Mingo De La Torre  -     EKG 12 Lead  Patient will be sent to the emergency room by ambulance  Acute bronchitis, unspecified organism  -     POCT Influenza A/B  -     POCT Glucose  -     XR CHEST STANDARD (2 VW); Future  -     levofloxacin (LEVAQUIN) 500 MG tablet; Take 1 tablet by mouth daily for 10 days      Recurrent major depressive disorder, in partial remission ([address]  -     Ambulatory referral to Psychology    We'll make follow-up appointment pending further workup through the emergency room with possible admission.

## 2018-02-26 ENCOUNTER — CARE COORDINATION (OUTPATIENT)
Dept: CASE MANAGEMENT | Age: 58
End: 2018-02-26

## 2018-02-27 ENCOUNTER — TELEPHONE (OUTPATIENT)
Dept: RHEUMATOLOGY | Age: 58
End: 2018-02-27

## 2018-02-27 DIAGNOSIS — J18.9 PNEUMONIA DUE TO INFECTIOUS ORGANISM, UNSPECIFIED LATERALITY, UNSPECIFIED PART OF LUNG: Primary | ICD-10-CM

## 2018-02-27 NOTE — CARE COORDINATION
[facility] Transitions Initial Follow Up Call    Call within 2 business days of discharge: Yes    Patient: Joseline Caldwell Patient : 1960   MRN: [id number]  Reason for Admission: There are no discharge diagnoses documented for the most recent discharge.   Discharge Date: 18 RARS: [facility] Risk Score: 17.75     2nd attempt at an initial 24 hour call, contact info left on vm    Follow Up  Future Appointments  Date Time Provider Urmila Sullivan   3/5/2018 3:00 PM MD Michelle Brasher [facility]       Sherita Howell RN

## 2018-02-28 RX ORDER — LEVOFLOXACIN 500 MG/1
500 TABLET, FILM COATED ORAL DAILY
Qty: 7 TABLET | Refills: 0 | Status: SHIPPED | OUTPATIENT
Start: 2018-02-28 | End: 2018-03-07

## 2018-02-28 NOTE — CARE COORDINATION
[facility] Transitions Initial Follow Up Call    Call within 2 business days of discharge: Yes    Patient: Amanda Yañez Patient : 1960   MRN: [id number]  Reason for Admission: There are no discharge diagnoses documented for the most recent discharge.   Discharge Date: 18 RARS: [facility] Risk Score: 17.75     3rd and final attempt at an initial 24 hour call, contact info left on vm      Follow Up  Future Appointments  Date Time Provider Urmila Sullivan   3/5/2018 3:00 PM MD Michelle Richardson [facility]       Deja Geronimo RN

## 2018-03-01 ENCOUNTER — CARE COORDINATION (OUTPATIENT)
Dept: CARE COORDINATION | Age: 58
End: 2018-03-01

## 2018-03-01 NOTE — CARE COORDINATION
Left detailed msg for pt, offered to maker her hosp f/u appt. Also informed her that  resent her Levaquin script to pharmacy for her, per phone msg yesterday. Encouraged a call back, left contact info.

## 2018-03-05 ENCOUNTER — CARE COORDINATION (OUTPATIENT)
Dept: CARE COORDINATION | Age: 58
End: 2018-03-05

## 2018-03-05 ENCOUNTER — OFFICE VISIT (OUTPATIENT)
Dept: CARDIOLOGY CLINIC | Age: 58
End: 2018-03-05

## 2018-03-05 VITALS
SYSTOLIC BLOOD PRESSURE: 132 MMHG | HEART RATE: 100 BPM | BODY MASS INDEX: 47.29 KG/M2 | WEIGHT: 293 LBS | DIASTOLIC BLOOD PRESSURE: 86 MMHG

## 2018-03-05 DIAGNOSIS — I20.8 STABLE ANGINA PECTORIS (HCC): Primary | ICD-10-CM

## 2018-03-05 PROCEDURE — 99213 OFFICE O/P EST LOW 20 MIN: CPT | Performed by: INTERNAL MEDICINE

## 2018-03-05 RX ORDER — ISOSORBIDE MONONITRATE 60 MG/1
60 TABLET, EXTENDED RELEASE ORAL DAILY
Qty: 90 TABLET | Refills: 3 | Status: SHIPPED | OUTPATIENT
Start: 2018-03-05 | End: 2019-04-23

## 2018-03-05 ASSESSMENT — ENCOUNTER SYMPTOMS
EYES NEGATIVE: 1
SHORTNESS OF BREATH: 1
ALLERGIC/IMMUNOLOGIC NEGATIVE: 1
GASTROINTESTINAL NEGATIVE: 1

## 2018-03-05 NOTE — PROGRESS NOTES
Subjective:      Patient ID: Lisa Kinsey is a 59 y.o. female. CC:   Follow up CAD and exertional sweating    Hypertension   Associated symptoms include palpitations and shortness of breath. She has some chest pressure with exertion. Cath showed 50% LAD distally. She has infarction at apex with mild periinfarction ischemia. Review of Systems   Constitutional: Positive for diaphoresis. HENT: Negative. Eyes: Negative. Respiratory: Positive for shortness of breath. Cardiovascular: Positive for palpitations. Gastrointestinal: Negative. Endocrine: Negative. Genitourinary: Negative. Musculoskeletal: Negative. Skin: Negative. Allergic/Immunologic: Negative. Neurological: Negative. Hematological: Negative. Psychiatric/Behavioral: Negative. Vitals:    03/05/18 1555   BP: 132/86   Pulse: 100         Objective:   Physical Exam   Constitutional: She is oriented to person, place, and time. She appears well-developed and well-nourished. HENT:   Head: Normocephalic and atraumatic. Eyes: EOM are normal. Pupils are equal, round, and reactive to light. Neck: Normal range of motion. Neck supple. Cardiovascular: Normal rate and regular rhythm. Exam reveals no friction rub. No murmur heard. Pulmonary/Chest: Effort normal and breath sounds normal.   Abdominal: Soft. Bowel sounds are normal.   Musculoskeletal: Normal range of motion. She exhibits no edema or deformity. Neurological: She is alert and oriented to person, place, and time. No cranial nerve deficit. Skin: Skin is warm and dry. Psychiatric: She has a normal mood and affect.  Her behavior is normal. Judgment and thought content normal.       Assessment:      Patient Active Problem List   Diagnosis    [facility]) [facility]    Arthritis    HTN (hypertension)    Osteomyelitis of spine (HCC)    Diskitis    Opiate analgesic use agreement exists    Type 2 diabetes mellitus with diabetic neuropathy

## 2018-03-20 ENCOUNTER — OFFICE VISIT (OUTPATIENT)
Dept: INTERNAL MEDICINE CLINIC | Age: 58
End: 2018-03-20

## 2018-03-20 VITALS
WEIGHT: 293 LBS | SYSTOLIC BLOOD PRESSURE: 138 MMHG | HEIGHT: 68 IN | BODY MASS INDEX: 44.41 KG/M2 | HEART RATE: 100 BPM | DIASTOLIC BLOOD PRESSURE: 88 MMHG

## 2018-03-20 DIAGNOSIS — Z12.31 ENCOUNTER FOR SCREENING MAMMOGRAM FOR BREAST CANCER: ICD-10-CM

## 2018-03-20 DIAGNOSIS — I10 ESSENTIAL HYPERTENSION: ICD-10-CM

## 2018-03-20 DIAGNOSIS — I10 ESSENTIAL HYPERTENSION: Primary | ICD-10-CM

## 2018-03-20 DIAGNOSIS — F33.42 RECURRENT MAJOR DEPRESSIVE DISORDER, IN FULL REMISSION (HCC): ICD-10-CM

## 2018-03-20 DIAGNOSIS — E78.01 FAMILIAL HYPERCHOLESTEROLEMIA: ICD-10-CM

## 2018-03-20 DIAGNOSIS — E11.40 TYPE 2 DIABETES MELLITUS WITH DIABETIC NEUROPATHY, WITH LONG-TERM CURRENT USE OF INSULIN (HCC): ICD-10-CM

## 2018-03-20 DIAGNOSIS — Z79.4 TYPE 2 DIABETES MELLITUS WITH DIABETIC NEUROPATHY, WITH LONG-TERM CURRENT USE OF INSULIN (HCC): ICD-10-CM

## 2018-03-20 LAB
ANION GAP SERPL CALCULATED.3IONS-SCNC: 12 MMOL/L (ref 3–16)
BUN BLDV-MCNC: 14 MG/DL (ref 7–20)
CALCIUM SERPL-MCNC: 9 MG/DL (ref 8.3–10.6)
CHLORIDE BLD-SCNC: 100 MMOL/L (ref 99–110)
CO2: 30 MMOL/L (ref 21–32)
CREAT SERPL-MCNC: 0.7 MG/DL (ref 0.6–1.2)
GFR AFRICAN AMERICAN: >60
GFR NON-AFRICAN AMERICAN: >60
GLUCOSE BLD-MCNC: 259 MG/DL (ref 70–99)
HCT VFR BLD CALC: 34.7 % (ref 36–48)
HEMOGLOBIN: 11.8 G/DL (ref 12–16)
MCH RBC QN AUTO: 29 PG (ref 26–34)
MCHC RBC AUTO-ENTMCNC: 34 G/DL (ref 31–36)
MCV RBC AUTO: 85.3 FL (ref 80–100)
PDW BLD-RTO: 15.5 % (ref 12.4–15.4)
PLATELET # BLD: 268 K/UL (ref 135–450)
PMV BLD AUTO: 8.3 FL (ref 5–10.5)
POTASSIUM SERPL-SCNC: 4.3 MMOL/L (ref 3.5–5.1)
RBC # BLD: 4.06 M/UL (ref 4–5.2)
SODIUM BLD-SCNC: 142 MMOL/L (ref 136–145)
WBC # BLD: 6.5 K/UL (ref 4–11)

## 2018-03-20 PROCEDURE — 99214 OFFICE O/P EST MOD 30 MIN: CPT | Performed by: INTERNAL MEDICINE

## 2018-03-20 ASSESSMENT — ENCOUNTER SYMPTOMS
VOICE CHANGE: 0
WHEEZING: 0
TROUBLE SWALLOWING: 0
SHORTNESS OF BREATH: 0
ABDOMINAL PAIN: 0
NAUSEA: 0
CHEST TIGHTNESS: 0
VOMITING: 0

## 2018-03-20 NOTE — PROGRESS NOTES
Hypertension Other     Stroke Other     Heart Disease Other     Diabetes Maternal Grandmother        Review of Systems   Constitutional: Negative for diaphoresis and unexpected weight change. HENT: Negative for trouble swallowing and voice change. Respiratory: Negative for chest tightness, shortness of breath and wheezing. Cardiovascular: Negative for chest pain and palpitations. Gastrointestinal: Negative for abdominal pain, nausea and vomiting. Endocrine: Negative for polyphagia and polyuria. Neurological: Negative for dizziness, syncope and light-headedness. OBJECTIVE:  Pulse Readings from Last 4 Encounters:   03/20/18 100   03/05/18 100   02/22/18 99   02/20/18 110      Wt Readings from Last 4 Encounters:   03/20/18 (!) 313 lb (142 kg)   03/05/18 (!) 311 lb (141.1 kg)   02/22/18 (!) 308 lb 6.4 oz (139.9 kg)   02/20/18 (!) 305 lb 12.8 oz (138.7 kg)     BP Readings from Last 4 Encounters:   03/20/18 138/88   03/05/18 132/86   02/22/18 (!) 149/82   02/20/18 (!) 150/100     Physical Exam   Constitutional: She is oriented to person, place, and time. obese   Eyes: Conjunctivae are normal. No scleral icterus. Neck: Neck supple. No JVD present. Cardiovascular: Normal rate and normal heart sounds. Pulmonary/Chest: Effort normal and breath sounds normal. No respiratory distress. She has no wheezes. Abdominal: Soft. Bowel sounds are normal.   Musculoskeletal:   Sensory exam of the foot is normal, tested with the monofilament. Good pulses, no lesions or ulcers, good peripheral pulses. 1+ edema     Neurological: She is alert and oriented to person, place, and time. Psychiatric: She has a normal mood and affect. Nursing note and vitals reviewed.       CBC:   Lab Results   Component Value Date    WBC 8.8 02/22/2018    HGB 10.6 02/22/2018    HCT 31.0 02/22/2018     02/22/2018     CMP:   Lab Results   Component Value Date     02/22/2018    K 3.3 02/22/2018    CL 96 02/22/2018 Lipitor  Encounter for screening mammogram for breast cancer  -     [facility] Digital Screen Bilateral [[id number]];  Future              Orders Placed This Encounter   Procedures    [facility] Digital Screen Bilateral [[id number]]     Standing Status:   Future     Standing Expiration Date:   3/20/2019    CBC     Standing Status:   Future     Number of Occurrences:   1     Standing Expiration Date:   3/20/2019    BASIC METABOLIC PANEL     Standing Status:   Future     Number of Occurrences:   1     Standing Expiration Date:   3/20/2019   Danni Osorio MD     Referral Priority:   Routine     Referral Type:   Consult for Advice and Opinion     Referral Reason:   Specialty Services Required     Referred to Provider:   Will Muller MD     Requested Specialty:   Endocrinology, Diabetes, & Metabolism     Number of Visits Requested:   1     DIABETES FOOT EXAM     Current Outpatient Prescriptions   Medication Sig Dispense Refill    insulin glargine (LANTUS SOLOSTAR) 100 UNIT/ML injection pen INJECT 50 UNITS INTO THE SKIN 2 TIMES DAILY 30 pen 1    isosorbide mononitrate (IMDUR) 60 MG extended release tablet Take 1 tablet by mouth daily 90 tablet 3    metoprolol succinate (TOPROL XL) 50 MG extended release tablet Take 50 mg by mouth daily      amLODIPine (NORVASC) 10 MG tablet TAKE 1 TABLET BY MOUTH DAILY 30 tablet 2    Dulaglutide (TRULICITY) 1.5 JR/5.6NM SOPN Inject 1.5 mg into the skin once a week 4 pen 5    metFORMIN (GLUCOPHAGE) 1000 MG tablet Take 1 tablet by mouth daily (with breakfast) 90 tablet 0    DULoxetine (CYMBALTA) 60 MG extended release capsule Take 1 capsule by mouth daily 30 capsule 2    cyclobenzaprine (FLEXERIL) 10 MG tablet Take 1 tablet by mouth every 8 hours as needed for Muscle spasms 90 tablet 1    aspirin 81 MG chewable tablet TAKE 1 TABLET BY MOUTH DAILY 30 tablet 5    lisinopril-hydrochlorothiazide (PRINZIDE;ZESTORETIC) 20-12.5 MG per tablet Take 1 tablet by mouth daily 30 tablet 5   

## 2018-03-26 ENCOUNTER — TELEPHONE (OUTPATIENT)
Dept: INTERNAL MEDICINE CLINIC | Age: 58
End: 2018-03-26

## 2018-03-26 DIAGNOSIS — Z12.11 SCREENING FOR COLON CANCER: Primary | ICD-10-CM

## 2018-03-26 LAB
CONTROL: NORMAL
HEMOCCULT STL QL: NORMAL

## 2018-03-26 PROCEDURE — 82274 ASSAY TEST FOR BLOOD FECAL: CPT | Performed by: INTERNAL MEDICINE

## 2018-03-29 ENCOUNTER — OFFICE VISIT (OUTPATIENT)
Dept: CARDIOLOGY CLINIC | Age: 58
End: 2018-03-29

## 2018-03-29 VITALS
SYSTOLIC BLOOD PRESSURE: 134 MMHG | HEART RATE: 103 BPM | DIASTOLIC BLOOD PRESSURE: 88 MMHG | WEIGHT: 293 LBS | OXYGEN SATURATION: 97 % | BODY MASS INDEX: 48.05 KG/M2

## 2018-03-29 DIAGNOSIS — I20.8 STABLE ANGINA PECTORIS (HCC): Primary | ICD-10-CM

## 2018-03-29 DIAGNOSIS — I10 ESSENTIAL HYPERTENSION: ICD-10-CM

## 2018-03-29 PROCEDURE — 99213 OFFICE O/P EST LOW 20 MIN: CPT | Performed by: NURSE PRACTITIONER

## 2018-03-29 RX ORDER — ATORVASTATIN CALCIUM 40 MG/1
40 TABLET, FILM COATED ORAL NIGHTLY
Qty: 90 TABLET | Refills: 3 | Status: SHIPPED | OUTPATIENT
Start: 2018-03-29 | End: 2018-10-07

## 2018-03-29 RX ORDER — LISINOPRIL AND HYDROCHLOROTHIAZIDE 20; 12.5 MG/1; MG/1
1 TABLET ORAL 2 TIMES DAILY
Qty: 180 TABLET | Refills: 3 | Status: SHIPPED | OUTPATIENT
Start: 2018-03-29 | End: 2018-07-13

## 2018-03-29 RX ORDER — AMLODIPINE BESYLATE 10 MG/1
10 TABLET ORAL DAILY
Qty: 90 TABLET | Refills: 3 | Status: SHIPPED | OUTPATIENT
Start: 2018-03-29 | End: 2018-07-13

## 2018-03-29 RX ORDER — METOPROLOL SUCCINATE 50 MG/1
50 TABLET, EXTENDED RELEASE ORAL DAILY
Qty: 90 TABLET | Refills: 3 | Status: SHIPPED | OUTPATIENT
Start: 2018-03-29 | End: 2018-07-13 | Stop reason: SDUPTHER

## 2018-03-29 ASSESSMENT — ENCOUNTER SYMPTOMS
GASTROINTESTINAL NEGATIVE: 1
SHORTNESS OF BREATH: 1

## 2018-03-29 NOTE — PATIENT INSTRUCTIONS
1. Medications: Try increasing lisinopril to twice a day, continue other medications  2. Labs: one week  3. Referrals: [facility] evaluation when insurance restarts  4. Lifestyle Recommendations:  cut out processed foods and eat a plant based diet with lean sources of protein, exercise at least 3 times a week for at least 20 minutes, add wt bearing exercise.   Add a fish oil supplement if you are not taking one.   5. Follow up: 6 months with Dr. Diogo Elliott

## 2018-03-29 NOTE — PROGRESS NOTES
TID. ON INSULIN. Home pt. DISPENSE METER BEST COVERED BY INSURANCE 10/13/17   Ray Sinclair MD   Lancets MISC #300  DAYS, TESTING TID, E11.9 10/13/17   Jorge Bird MD   UNABLE TO FIND GLUCOSE TEST STRIPS, TESTING TID, ON INSULIN, E11.9 #300  DAYS  DISPENSE STRIPS BEST COVERED BY INSURANCE 10/13/17   Ray Sinclair MD   glucose monitoring kit (FREESTYLE) monitoring kit 1 kit by Does not apply route daily 10/10/17   Ray Sinclair MD   Insulin Pen Needle 32G X 4 MM MISC Use to give insulin 4 x daily and with sliding scale 10/10/17   Ray Sinclair MD   atorvastatin (LIPITOR) 40 MG tablet Take 1 tablet by mouth nightly 9/29/17    Ganga Bird MD   albuterol sulfate HFA (PROAIR HFA) 108 (90 Base) MCG/ACT inhaler Inhale 2 puffs into the lungs every 6 hours as needed for Wheezing 9/29/17   Ray Sinclair MD   insulin lispro (HUMALOG KWIKPEN) 100 UNIT/ML pen Inject 10 Units into the skin 3 times daily (before meals) plus sliding scale. Max daily dose 90 units. 11/2/16   Johnny Lis, DO   glucose blood VI test strips (FREESTYLE LITE) strip 1 each by In Vitro route daily As needed. 10/19/16   Johnny Lis, DO   Lancet Devices (ACCU-CHEK SOFTCLIX LANCET DEV) MISC 1 Units by Does not apply route 4 times daily (before meals and nightly) 5/4/15   Robina Noyola MD   SOFT TOUCH LANCETS MISC 1 strip by Does not apply route 4 times daily 5/4/15   Robina Noyola MD        Allergies:  Benadryl [diphenhydramine hcl]; Zolpidem tartrate [zolpidem tartrate er]; and Diphenhydramine     ROS:   Review of Systems   Constitutional: Positive for fatigue. Negative for chills and fever. HENT: Negative. Respiratory: Positive for shortness of breath. Cardiovascular: Positive for leg swelling. Negative for chest pain and palpitations. Gastrointestinal: Negative. Musculoskeletal: Negative. Skin: Negative. Neurological: Negative. Hematological: Negative. Psychiatric/Behavioral: Negative.

## 2018-03-29 NOTE — LETTER
[address] [id number]  Phone: [phone]  Fax: [phone]    Ning MaximinoMarnie kelly        March 29, 2018     Rahul Manuel MD  [address] [place] [id number]    Patient: Loida Bal  MR Number: [id number]  YOB: 1960  Date of Visit: 3/29/2018    Dear Dr. Ramez Bird:    If you have questions, please do not hesitate to call me. I look forward to following Demetria Watson along with you.     Sincerely,        Maria C DOW, CNP

## 2018-04-16 ENCOUNTER — TELEPHONE (OUTPATIENT)
Dept: INTERNAL MEDICINE CLINIC | Age: 58
End: 2018-04-16

## 2018-04-18 ENCOUNTER — TELEPHONE (OUTPATIENT)
Dept: INTERNAL MEDICINE CLINIC | Age: 58
End: 2018-04-18

## 2018-04-24 ENCOUNTER — CARE COORDINATION (OUTPATIENT)
Dept: CARE COORDINATION | Age: 58
End: 2018-04-24

## 2018-06-18 ENCOUNTER — TELEPHONE (OUTPATIENT)
Dept: RHEUMATOLOGY | Age: 58
End: 2018-06-18

## 2018-06-18 DIAGNOSIS — Z79.4 TYPE 2 DIABETES MELLITUS WITH DIABETIC NEUROPATHY, WITH LONG-TERM CURRENT USE OF INSULIN (HCC): ICD-10-CM

## 2018-06-18 DIAGNOSIS — E08.21 DIABETES MELLITUS DUE TO UNDERLYING CONDITION WITH DIABETIC NEPHROPATHY, WITH LONG-TERM CURRENT USE OF INSULIN (HCC): ICD-10-CM

## 2018-06-18 DIAGNOSIS — Z79.4 DIABETES MELLITUS DUE TO UNDERLYING CONDITION WITH DIABETIC NEPHROPATHY, WITH LONG-TERM CURRENT USE OF INSULIN (HCC): ICD-10-CM

## 2018-06-18 DIAGNOSIS — E11.40 TYPE 2 DIABETES MELLITUS WITH DIABETIC NEUROPATHY, WITH LONG-TERM CURRENT USE OF INSULIN (HCC): ICD-10-CM

## 2018-06-20 ENCOUNTER — CARE COORDINATION (OUTPATIENT)
Dept: CARE COORDINATION | Age: 58
End: 2018-06-20

## 2018-06-26 ENCOUNTER — CARE COORDINATION (OUTPATIENT)
Dept: CARE COORDINATION | Age: 58
End: 2018-06-26

## 2018-07-11 ENCOUNTER — TELEPHONE (OUTPATIENT)
Dept: INTERNAL MEDICINE CLINIC | Age: 58
End: 2018-07-11

## 2018-07-12 ENCOUNTER — TELEPHONE (OUTPATIENT)
Dept: INTERNAL MEDICINE CLINIC | Age: 58
End: 2018-07-12

## 2018-07-13 ENCOUNTER — OFFICE VISIT (OUTPATIENT)
Dept: INTERNAL MEDICINE CLINIC | Age: 58
End: 2018-07-13

## 2018-07-13 VITALS — HEART RATE: 72 BPM | SYSTOLIC BLOOD PRESSURE: 126 MMHG | DIASTOLIC BLOOD PRESSURE: 88 MMHG

## 2018-07-13 DIAGNOSIS — F32.A DEPRESSION, UNSPECIFIED DEPRESSION TYPE: ICD-10-CM

## 2018-07-13 DIAGNOSIS — I10 ESSENTIAL HYPERTENSION: ICD-10-CM

## 2018-07-13 DIAGNOSIS — F33.41 RECURRENT MAJOR DEPRESSIVE DISORDER, IN PARTIAL REMISSION (HCC): ICD-10-CM

## 2018-07-13 DIAGNOSIS — Z79.4 TYPE 2 DIABETES MELLITUS WITH DIABETIC NEUROPATHY, WITH LONG-TERM CURRENT USE OF INSULIN (HCC): Primary | ICD-10-CM

## 2018-07-13 DIAGNOSIS — E11.40 TYPE 2 DIABETES MELLITUS WITH DIABETIC NEUROPATHY, WITH LONG-TERM CURRENT USE OF INSULIN (HCC): Primary | ICD-10-CM

## 2018-07-13 PROCEDURE — 99214 OFFICE O/P EST MOD 30 MIN: CPT | Performed by: INTERNAL MEDICINE

## 2018-07-13 RX ORDER — LISINOPRIL 5 MG/1
5 TABLET ORAL DAILY
Qty: 30 TABLET | Refills: 3 | Status: SHIPPED | OUTPATIENT
Start: 2018-07-13 | End: 2018-10-15 | Stop reason: SDUPTHER

## 2018-07-13 RX ORDER — METOPROLOL SUCCINATE 50 MG/1
50 TABLET, EXTENDED RELEASE ORAL DAILY
Qty: 90 TABLET | Refills: 1 | Status: SHIPPED | OUTPATIENT
Start: 2018-07-13 | End: 2019-04-23 | Stop reason: SDUPTHER

## 2018-07-13 ASSESSMENT — ENCOUNTER SYMPTOMS
WHEEZING: 0
PHOTOPHOBIA: 0
ABDOMINAL PAIN: 0
SHORTNESS OF BREATH: 0
CHEST TIGHTNESS: 0
NAUSEA: 0

## 2018-07-13 NOTE — PROGRESS NOTES
Pulmonary/Chest: Effort normal and breath sounds normal. No respiratory distress. She has no wheezes. Abdominal: Soft. Bowel sounds are normal. She exhibits no distension. Musculoskeletal: She exhibits no edema. Neurological: She is alert and oriented to person, place, and time. Psychiatric: Judgment normal.   Nursing note and vitals reviewed. Assessment/Plan:  Patrica Durant was seen today for follow-up. Diagnoses and all orders for this visit:    Type 2 diabetes mellitus with diabetic neuropathy, with long-term current use of insulin ([facility])  -     metFORMIN (GLUCOPHAGE) 1000 MG tablet; Take 1 tablet by mouth daily (with breakfast)  Continue long-acting insulin, Trulicity and short-acting insulin. Advised to bring the blood sugar reading with her next time. Essential hypertension  Her blood pressures is pretty good control we will discontinue Norvasc and lisinopril hydrochlorothiazide combination  -     metoprolol succinate (TOPROL XL) 50 MG extended release tablet; Take 1 tablet by mouth daily  -     lisinopril (PRINIVIL;ZESTRIL) 5 MG tablet; Take 1 tablet by mouth daily    Recurrent major depressive disorder, in partial remission ([facility])  Depression, unspecified depression type  Partially controlled. Patient would not be able to afford Cymbalta now    An After Visit Summary was printed and given to the patient. Documentation was done using voice recognition dragon software. Every effort was made to ensure accuracy; however, inadvertent  Unintentional computerized transcription errors may be present.

## 2018-07-31 ENCOUNTER — CARE COORDINATION (OUTPATIENT)
Dept: CARE COORDINATION | Age: 58
End: 2018-07-31

## 2018-07-31 NOTE — CARE COORDINATION
This ACC returned call to pt- notified of [facility] offer OTC glucometer, test strips, and lancets. This ACC provided contact information including address. Pt stated she will call and expressed gratitude. This ACC reminded pt to call for any immediate needs or assistance. Pt aware to call [facility] 24/7 for any concerns or health status change.

## 2018-08-17 ENCOUNTER — TELEPHONE (OUTPATIENT)
Dept: INTERNAL MEDICINE CLINIC | Age: 58
End: 2018-08-17

## 2018-08-20 ENCOUNTER — OFFICE VISIT (OUTPATIENT)
Dept: INTERNAL MEDICINE CLINIC | Age: 58
End: 2018-08-20

## 2018-08-20 VITALS
SYSTOLIC BLOOD PRESSURE: 148 MMHG | BODY MASS INDEX: 44.25 KG/M2 | DIASTOLIC BLOOD PRESSURE: 88 MMHG | WEIGHT: 292 LBS | HEIGHT: 68 IN

## 2018-08-20 DIAGNOSIS — L50.9 HIVES: ICD-10-CM

## 2018-08-20 DIAGNOSIS — K21.9 GASTROESOPHAGEAL REFLUX DISEASE, ESOPHAGITIS PRESENCE NOT SPECIFIED: ICD-10-CM

## 2018-08-20 DIAGNOSIS — K21.9 GASTROESOPHAGEAL REFLUX DISEASE, ESOPHAGITIS PRESENCE NOT SPECIFIED: Primary | ICD-10-CM

## 2018-08-20 DIAGNOSIS — R10.13 EPIGASTRIC PAIN: ICD-10-CM

## 2018-08-20 DIAGNOSIS — E11.40 TYPE 2 DIABETES MELLITUS WITH DIABETIC NEUROPATHY, WITH LONG-TERM CURRENT USE OF INSULIN (HCC): ICD-10-CM

## 2018-08-20 DIAGNOSIS — F41.9 ANXIETY: ICD-10-CM

## 2018-08-20 DIAGNOSIS — Z79.4 TYPE 2 DIABETES MELLITUS WITH DIABETIC NEUROPATHY, WITH LONG-TERM CURRENT USE OF INSULIN (HCC): ICD-10-CM

## 2018-08-20 LAB
ALBUMIN SERPL-MCNC: 4 G/DL (ref 3.4–5)
ALP BLD-CCNC: 98 U/L (ref 40–129)
ALT SERPL-CCNC: 7 U/L (ref 10–40)
AMYLASE: 85 U/L (ref 25–115)
ANION GAP SERPL CALCULATED.3IONS-SCNC: 13 MMOL/L (ref 3–16)
AST SERPL-CCNC: 12 U/L (ref 15–37)
BILIRUB SERPL-MCNC: 0.4 MG/DL (ref 0–1)
BILIRUBIN DIRECT: <0.2 MG/DL (ref 0–0.3)
BILIRUBIN URINE: NEGATIVE
BILIRUBIN, INDIRECT: ABNORMAL MG/DL (ref 0–1)
BLOOD, URINE: NEGATIVE
BUN BLDV-MCNC: 11 MG/DL (ref 7–20)
CALCIUM SERPL-MCNC: 9.4 MG/DL (ref 8.3–10.6)
CHLORIDE BLD-SCNC: 98 MMOL/L (ref 99–110)
CLARITY: CLEAR
CO2: 27 MMOL/L (ref 21–32)
COLOR: YELLOW
CREAT SERPL-MCNC: 0.8 MG/DL (ref 0.6–1.2)
GFR AFRICAN AMERICAN: >60
GFR NON-AFRICAN AMERICAN: >60
GLUCOSE BLD-MCNC: 369 MG/DL (ref 70–99)
GLUCOSE URINE: >=1000 MG/DL
HCT VFR BLD CALC: 38.2 % (ref 36–48)
HEMOGLOBIN: 12.7 G/DL (ref 12–16)
KETONES, URINE: NEGATIVE MG/DL
LEUKOCYTE ESTERASE, URINE: NEGATIVE
LIPASE: 22 U/L (ref 13–60)
MCH RBC QN AUTO: 29.2 PG (ref 26–34)
MCHC RBC AUTO-ENTMCNC: 33.3 G/DL (ref 31–36)
MCV RBC AUTO: 87.8 FL (ref 80–100)
MICROSCOPIC EXAMINATION: ABNORMAL
NITRITE, URINE: NEGATIVE
PDW BLD-RTO: 15 % (ref 12.4–15.4)
PH UA: 5.5
PLATELET # BLD: 277 K/UL (ref 135–450)
PLATELET SLIDE REVIEW: ADEQUATE
PMV BLD AUTO: 8.6 FL (ref 5–10.5)
POTASSIUM SERPL-SCNC: 4.8 MMOL/L (ref 3.5–5.1)
PROTEIN UA: NEGATIVE MG/DL
RBC # BLD: 4.35 M/UL (ref 4–5.2)
SODIUM BLD-SCNC: 138 MMOL/L (ref 136–145)
SPECIFIC GRAVITY UA: >1.03
TOTAL PROTEIN: 6.9 G/DL (ref 6.4–8.2)
URINE TYPE: ABNORMAL
UROBILINOGEN, URINE: 0.2 E.U./DL
WBC # BLD: 5.1 K/UL (ref 4–11)

## 2018-08-20 PROCEDURE — 99214 OFFICE O/P EST MOD 30 MIN: CPT | Performed by: INTERNAL MEDICINE

## 2018-08-20 RX ORDER — OMEPRAZOLE 20 MG/1
20 CAPSULE, DELAYED RELEASE ORAL 2 TIMES DAILY
Qty: 60 CAPSULE | Refills: 0 | Status: SHIPPED | OUTPATIENT
Start: 2018-08-20 | End: 2022-10-28 | Stop reason: SDUPTHER

## 2018-08-20 RX ORDER — HYDROXYZINE HYDROCHLORIDE 25 MG/1
25 TABLET, FILM COATED ORAL 3 TIMES DAILY PRN
Qty: 90 TABLET | Refills: 2 | Status: SHIPPED | OUTPATIENT
Start: 2018-08-20 | End: 2018-09-19

## 2018-08-20 RX ORDER — HYDROXYZINE HYDROCHLORIDE 25 MG/1
25 TABLET, FILM COATED ORAL 3 TIMES DAILY PRN
Qty: 90 TABLET | Refills: 2 | Status: SHIPPED | OUTPATIENT
Start: 2018-08-20 | End: 2018-08-20 | Stop reason: SDUPTHER

## 2018-08-20 ASSESSMENT — ENCOUNTER SYMPTOMS
RECTAL PAIN: 0
ANAL BLEEDING: 0
HEMATOCHEZIA: 0
ABDOMINAL PAIN: 1
VOICE CHANGE: 0
VOMITING: 0
WHEEZING: 0
SHORTNESS OF BREATH: 0
TROUBLE SWALLOWING: 0
NAUSEA: 1
BLOOD IN STOOL: 0
CONSTIPATION: 0
FLATUS: 0
DIARRHEA: 0

## 2018-08-20 NOTE — PROGRESS NOTES
Denzel Atkins  1960  female  59 y.o. SUBJECTIVE:    Chief Complaint   Patient presents with    Nausea     with abd pain the past several months       HPI:  Abdominal Pain   This is a new problem. Episode onset: last 2 weeks. The problem occurs intermittently. The pain is located in the epigastric region. The pain is at a severity of 4/10. The quality of the pain is burning. The abdominal pain does not radiate. Associated symptoms include nausea. Pertinent negatives include no arthralgias, constipation, diarrhea, dysuria, flatus, frequency, headaches, hematochezia, melena, vomiting or weight loss. Nothing aggravates the pain. She has tried nothing for the symptoms. She is also requesting refill of her anxiety medications. Blood glucose is around 180-200. On Trulicity. And other meds    Past Medical History:   Diagnosis Date    Anxiety     Arthritis     Asthma     as child    Bilateral chronic knee pain     sees pain management    Degenerative disc disease, cervical MRI 2011    Multilevel cervical degenerative this disease most significant at the C7-T1 level where there is probable impingement of the exiting right C8 nerve root caused by disc herniation.     Depression     Diabetes     Mihaela Walsh, NP at [facility]    High blood pressure     Hypertriglyceridemia     Lumbar herniated disc MRI 2011    L5-S1    Morbid obesity (HCC)     Opiate dependence ([address]     Osteomyelitis (HCC)     Pneumonia     as child     Past Surgical History:   Procedure Laterality Date    CARDIAC CATHETERIZATION  11/2017    1.  Diffusely diseased distal LAD.  Distal LAD has a 50% narrowing in two ---     Social History     Social History    Marital status:      Spouse name: N/A    Number of children: 4    Years of education: N/A     Occupational History    /Masters      [id number] [employer]     Social History Main Topics    Smoking status: Never Smoker    Smokeless tobacco: Never Used CVA tenderness, no tenderness at McBurney's point and negative Sellers's sign. Mild epigastric tenderness. Lymphadenopathy:     She has no cervical adenopathy. Nursing note and vitals reviewed. CBC:   Lab Results   Component Value Date    WBC 6.5 03/20/2018    HGB 11.8 03/20/2018    HCT 34.7 03/20/2018     03/20/2018     CMP:   Lab Results   Component Value Date     03/20/2018    K 4.3 03/20/2018     03/20/2018    CO2 30 03/20/2018    ANIONGAP 12 03/20/2018    GLUCOSE 259 03/20/2018    BUN 14 03/20/2018    CREATININE 0.7 03/20/2018    GFRAA >60 03/20/2018    CALCIUM 9.0 03/20/2018    PROT 7.6 02/20/2018    LABALBU 3.6 02/22/2018    AGRATIO 0.8 02/20/2018    BILITOT 0.3 02/20/2018    ALKPHOS 82 02/20/2018    ALT 13 02/20/2018    AST 14 02/20/2018    GLOB 4.2 02/20/2018     URINALYSIS:   Lab Results   Component Value Date    GLUCOSEU 500 04/09/2015    KETUA Negative 04/09/2015    SPECGRAV <=1.005 04/09/2015    BLOODU Negative 04/09/2015    PHUR 5.5 11/02/2016    PROTEINU Negative 04/09/2015    NITRU Negative 04/09/2015    LEUKOCYTESUR Negative 04/09/2015    LABMICR <1.20 11/28/2017    LABMICR SEE BELOW 04/09/2015    URINETYPE Not Specified 09/29/2013     HBA1C:   Lab Results   Component Value Date    LABA1C 12.9 02/20/2018    .5 02/20/2018     MICRO/ALB:   Lab Results   Component Value Date    LABMICR <1.20 11/28/2017    LABMICR SEE BELOW 04/09/2015    LABCREA 165.8 11/28/2017    MALBCR see below 11/28/2017     LIPID:   Lab Results   Component Value Date    CHOL 121 11/06/2017    TRIG 94 11/06/2017    HDL 60 11/06/2017    LDLCALC 42 11/06/2017    LABVLDL 19 11/06/2017     TSH:   Lab Results   Component Value Date    TSHREFLEX 0.89 01/23/2017         ASSESSMENT/PLAN:    Sheron Faye was seen today for nausea. Diagnoses and all orders for this visit:    Gastroesophageal reflux disease, esophagitis presence not specified  -     omeprazole (PRILOSEC) 20 MG delayed release capsule;  Take 1 capsule by mouth 2 times daily  -     CBC; Future  -     HEPATIC FUNCTION PANEL; Future  -     BASIC METABOLIC PANEL; Future  -     LIPASE; Future  -     AMYLASE; Future    Epigastric pain  -     Urinalysis; Future    Hives  -     Discontinue: hydrOXYzine (ATARAX) 25 MG tablet; Take 1 tablet by mouth 3 times daily as needed for Anxiety  -     hydrOXYzine (ATARAX) 25 MG tablet; Take 1 tablet by mouth 3 times daily as needed for Anxiety    Anxiety  -     Discontinue: hydrOXYzine (ATARAX) 25 MG tablet; Take 1 tablet by mouth 3 times daily as needed for Anxiety  -     hydrOXYzine (ATARAX) 25 MG tablet; Take 1 tablet by mouth 3 times daily as needed for Anxiety    Type 2 diabetes mellitus with diabetic neuropathy, with long-term current use of insulin (HCC)  -     HEMOGLOBIN A1C; Future    If any worsening or new symptoms or concerning symptoms call us or go to ER.       Orders Placed This Encounter   Procedures    CBC     Standing Status:   Future     Number of Occurrences:   1     Standing Expiration Date:   8/20/2019    HEPATIC FUNCTION PANEL     Standing Status:   Future     Number of Occurrences:   1     Standing Expiration Date:   8/20/2019    BASIC METABOLIC PANEL     Standing Status:   Future     Number of Occurrences:   1     Standing Expiration Date:   8/20/2019    LIPASE     Standing Status:   Future     Number of Occurrences:   1     Standing Expiration Date:   8/20/2019    AMYLASE     Standing Status:   Future     Number of Occurrences:   1     Standing Expiration Date:   8/20/2019    Urinalysis     Standing Status:   Future     Number of Occurrences:   1     Standing Expiration Date:   8/20/2019    HEMOGLOBIN A1C     Standing Status:   Future     Number of Occurrences:   1     Standing Expiration Date:   8/20/2019     Current Outpatient Prescriptions   Medication Sig Dispense Refill    omeprazole (PRILOSEC) 20 MG delayed release capsule Take 1 capsule by mouth 2 times daily 60 capsule 0   

## 2018-08-21 LAB
ESTIMATED AVERAGE GLUCOSE: 378.1 MG/DL
HBA1C MFR BLD: 14.8 %

## 2018-08-24 ENCOUNTER — CARE COORDINATION (OUTPATIENT)
Dept: CARE COORDINATION | Age: 58
End: 2018-08-24

## 2018-08-31 ENCOUNTER — CARE COORDINATION (OUTPATIENT)
Dept: CARE COORDINATION | Age: 58
End: 2018-08-31

## 2018-08-31 NOTE — CARE COORDINATION
This ACC left message on voicemail; requested call back at number provided.   Purpose of call is to check on compliancy of new lantus orders, scheduling of 2 week follow up with Dr. Elsa Brito, along with following through with Endocrinologist referral.

## 2018-09-13 ENCOUNTER — CARE COORDINATION (OUTPATIENT)
Dept: CARE COORDINATION | Age: 58
End: 2018-09-13

## 2018-09-13 DIAGNOSIS — E16.2 HYPOGLYCEMIA: ICD-10-CM

## 2018-09-13 DIAGNOSIS — Z79.4 TYPE 2 DIABETES MELLITUS WITH DIABETIC NEUROPATHY, WITH LONG-TERM CURRENT USE OF INSULIN (HCC): Primary | ICD-10-CM

## 2018-09-13 DIAGNOSIS — E11.40 TYPE 2 DIABETES MELLITUS WITH DIABETIC NEUROPATHY, WITH LONG-TERM CURRENT USE OF INSULIN (HCC): Primary | ICD-10-CM

## 2018-09-13 NOTE — CARE COORDINATION
Ambulatory Care Coordination Note  9/13/2018  CM Risk Score: 6  Andres Mortality Risk Score:      ACC: Orestes Walker, RN    Summary Note:This ACC spoke with pt regarding DM management, lantus adjustment, status of endocrinologist appointment along with scheduling 2 week follow up with Dr. Yung Salcedo.  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 She will have health insurance starting 10/01/2018. Pt reported that she joined a gym 1.5 weeks ago for low weight bearing exercises and she stared a plant-based diet 2 weeks ago. Pt has experienced a low blood sugar while exercising.; aeb pt experiencing shakiness. Pt stated that when her BS is in the 60's that is when she becomes shaky and when it is in the 40's she becomes sleepy. Pt was able to recover from low BS. AM FBS have been ranging between 170-180 since starting the plant-based diet. Pt expressed relief of obtained employment and is ready to move forward with a healthier lifestyle. Pt has not made 2 week follow up with Dr. Yung Salcedo yet d/t not having insurance yet and she reported her abdominal pain has resolved once she received job offer; pt believes it was stress related. Will continue to monitor. This ACC and pt agree to speak in 2 weeks. Pt aware to call this [facility] with any needs or assistance and to call [facility] 24/7 for any concerns or health status changes. Care Coordination Interventions    Program Enrollment:  Complex Care  Referral from Primary Care Provider:  No  Suggested Interventions and Community Resources  Diabetes Education: In Process (Comment: referral sent to Dr. Soila Dior for endocrinology)  Life Skills Coaching: In Process  Medication Assistance Program:  In Process  Other Services:   In Process (Comment: [phone] for cost-effective diabetic testing supplies.)  Other Services or Interventions:  medication mgt, plant-based diet         Goals Addressed             Most Recent Inject 1.5 mg into the skin once a week 6/18/18   Gerardo Bird MD   atorvastatin (LIPITOR) 40 MG tablet Take 1 tablet by mouth nightly 3/29/18   MORGAN James CNP   glucose blood VI test strips (FREESTYLE LITE) strip 1 each by In Vitro route daily As needed. 3/29/18   MORGAN James CNP   insulin glargine (LANTUS SOLOSTAR) 100 UNIT/ML injection pen INJECT 50 UNITS INTO THE SKIN 2 TIMES DAILY 3/20/18   M Roxie Navarro MD   isosorbide mononitrate (IMDUR) 60 MG extended release tablet Take 1 tablet by mouth daily 3/5/18   Callie Castaneda MD   nitroGLYCERIN (NITROSTAT) 0.4 MG SL tablet Place 1 tablet under the tongue every 5 minutes as needed for Chest pain up to max of 3 total doses. If no relief after 1 dose, call 911. 2/16/18   Callie Castaneda MD   DULoxetine (CYMBALTA) 60 MG extended release capsule Take 1 capsule by mouth daily 2/5/18   Gerardo Bird MD   cyclobenzaprine (FLEXERIL) 10 MG tablet Take 1 tablet by mouth every 8 hours as needed for Muscle spasms 2/5/18   Kendra Sanz MD   aspirin 81 MG chewable tablet TAKE 1 TABLET BY MOUTH DAILY 2/5/18   M Roxie Navarro MD   oxyCODONE-acetaminophen (PERCOCET) 7.5-325 MG per tablet Take 1 tablet by mouth 4 times daily . Historical Provider, MD   UNABLE TO FIND Glucometer #1, E11.9, testing TID. ON INSULIN. Home pt.   DISPENSE METER BEST COVERED BY INSURANCE 10/13/17   M Roxie Navarro MD   Lancets MISC #300  DAYS, TESTING TID, E11.9 10/13/17   Gerardo Bird MD   UNABLE TO FIND GLUCOSE TEST STRIPS, TESTING TID, ON INSULIN, E11.9 #300  DAYS  DISPENSE STRIPS BEST COVERED BY INSURANCE 10/13/17   M Roxie Navarro MD   glucose monitoring kit (FREESTYLE) monitoring kit 1 kit by Does not apply route daily 10/10/17   M Roxie Navarro MD   Insulin Pen Needle 32G X 4 MM MISC Use to give insulin 4 x daily and with sliding scale 10/10/17   Gerardo Bird MD   albuterol sulfate HFA (PROAIR HFA) 108 (90 Base) MCG/ACT

## 2018-09-13 NOTE — TELEPHONE ENCOUNTER
I agree with the Care Coordinator's Plan of Care      Need OV with glucometer reading. Glucagon injection as needed.

## 2018-10-01 ENCOUNTER — CARE COORDINATION (OUTPATIENT)
Dept: CARE COORDINATION | Age: 58
End: 2018-10-01

## 2018-10-01 NOTE — CARE COORDINATION
Pt returned call to this [facility]. Pt benefits will not start until 12/17/2018. This ACC will have Dr. Yassine Tamayo sign the Lantus refill request form and this ACC will fax it back to [facility]. Pt reported that she can go without Trulicity for a week or so until her coverage is effective. This ACC strongly recommended against that. This ACC will call [facility] to discuss providing pt a 2 week supply to cover pt until coverage is effective. Pt agreed with POC.

## 2018-10-07 ENCOUNTER — APPOINTMENT (OUTPATIENT)
Dept: CT IMAGING | Age: 58
End: 2018-10-07

## 2018-10-07 ENCOUNTER — APPOINTMENT (OUTPATIENT)
Dept: GENERAL RADIOLOGY | Age: 58
End: 2018-10-07

## 2018-10-07 ENCOUNTER — APPOINTMENT (OUTPATIENT)
Dept: MRI IMAGING | Age: 58
End: 2018-10-07

## 2018-10-07 ENCOUNTER — HOSPITAL ENCOUNTER (EMERGENCY)
Age: 58
Discharge: HOME | End: 2018-10-07
Attending: EMERGENCY MEDICINE

## 2018-10-07 VITALS
SYSTOLIC BLOOD PRESSURE: 162 MMHG | HEART RATE: 100 BPM | WEIGHT: 285 LBS | OXYGEN SATURATION: 100 % | RESPIRATION RATE: 18 BRPM | TEMPERATURE: 98.1 F | HEIGHT: 68 IN | DIASTOLIC BLOOD PRESSURE: 97 MMHG | BODY MASS INDEX: 43.19 KG/M2

## 2018-10-07 DIAGNOSIS — M50.30 DEGENERATIVE DISC DISEASE, CERVICAL: ICD-10-CM

## 2018-10-07 DIAGNOSIS — M25.552 HIP PAIN, LEFT: ICD-10-CM

## 2018-10-07 DIAGNOSIS — M51.36 DEGENERATIVE DISC DISEASE, LUMBAR: ICD-10-CM

## 2018-10-07 DIAGNOSIS — M54.2 NECK PAIN: Primary | ICD-10-CM

## 2018-10-07 LAB
A/G RATIO: 1.1 (ref 1.1–2.2)
ALBUMIN SERPL-MCNC: 4 G/DL (ref 3.4–5)
ALP BLD-CCNC: 106 U/L (ref 40–129)
ALT SERPL-CCNC: 6 U/L (ref 10–40)
ANION GAP SERPL CALCULATED.3IONS-SCNC: 12 MMOL/L (ref 3–16)
AST SERPL-CCNC: 8 U/L (ref 15–37)
BASOPHILS ABSOLUTE: 0.1 K/UL (ref 0–0.2)
BASOPHILS RELATIVE PERCENT: 0.7 %
BILIRUB SERPL-MCNC: 0.5 MG/DL (ref 0–1)
BUN BLDV-MCNC: 12 MG/DL (ref 7–20)
CALCIUM SERPL-MCNC: 9.6 MG/DL (ref 8.3–10.6)
CHLORIDE BLD-SCNC: 99 MMOL/L (ref 99–110)
CO2: 27 MMOL/L (ref 21–32)
CREAT SERPL-MCNC: 0.9 MG/DL (ref 0.6–1.2)
EOSINOPHILS ABSOLUTE: 0.2 K/UL (ref 0–0.6)
EOSINOPHILS RELATIVE PERCENT: 1.7 %
GFR AFRICAN AMERICAN: >60
GFR NON-AFRICAN AMERICAN: >60
GLOBULIN: 3.8 G/DL
GLUCOSE BLD-MCNC: 360 MG/DL (ref 70–99)
HCT VFR BLD CALC: 36.8 % (ref 36–48)
HEMOGLOBIN: 12.6 G/DL (ref 12–16)
LYMPHOCYTES ABSOLUTE: 1.8 K/UL (ref 1–5.1)
LYMPHOCYTES RELATIVE PERCENT: 19.6 %
MCH RBC QN AUTO: 29.8 PG (ref 26–34)
MCHC RBC AUTO-ENTMCNC: 34.3 G/DL (ref 31–36)
MCV RBC AUTO: 86.8 FL (ref 80–100)
MONOCYTES ABSOLUTE: 0.6 K/UL (ref 0–1.3)
MONOCYTES RELATIVE PERCENT: 6.7 %
NEUTROPHILS ABSOLUTE: 6.6 K/UL (ref 1.7–7.7)
NEUTROPHILS RELATIVE PERCENT: 71.3 %
PDW BLD-RTO: 14.7 % (ref 12.4–15.4)
PLATELET # BLD: 301 K/UL (ref 135–450)
PMV BLD AUTO: 8.1 FL (ref 5–10.5)
POTASSIUM REFLEX MAGNESIUM: 4.2 MMOL/L (ref 3.5–5.1)
RBC # BLD: 4.24 M/UL (ref 4–5.2)
SEDIMENTATION RATE, ERYTHROCYTE: 52 MM/HR (ref 0–30)
SODIUM BLD-SCNC: 138 MMOL/L (ref 136–145)
TOTAL PROTEIN: 7.8 G/DL (ref 6.4–8.2)
WBC # BLD: 9.2 K/UL (ref 4–11)

## 2018-10-07 PROCEDURE — 85025 COMPLETE CBC W/AUTO DIFF WBC: CPT

## 2018-10-07 PROCEDURE — 73501 X-RAY EXAM HIP UNI 1 VIEW: CPT

## 2018-10-07 PROCEDURE — 96375 TX/PRO/DX INJ NEW DRUG ADDON: CPT

## 2018-10-07 PROCEDURE — 72100 X-RAY EXAM L-S SPINE 2/3 VWS: CPT

## 2018-10-07 PROCEDURE — 72125 CT NECK SPINE W/O DYE: CPT

## 2018-10-07 PROCEDURE — 80053 COMPREHEN METABOLIC PANEL: CPT

## 2018-10-07 PROCEDURE — 99284 EMERGENCY DEPT VISIT MOD MDM: CPT

## 2018-10-07 PROCEDURE — 96376 TX/PRO/DX INJ SAME DRUG ADON: CPT

## 2018-10-07 PROCEDURE — 6360000002 HC RX W HCPCS: Performed by: EMERGENCY MEDICINE

## 2018-10-07 PROCEDURE — 85652 RBC SED RATE AUTOMATED: CPT

## 2018-10-07 PROCEDURE — 72141 MRI NECK SPINE W/O DYE: CPT

## 2018-10-07 PROCEDURE — 2580000003 HC RX 258: Performed by: EMERGENCY MEDICINE

## 2018-10-07 PROCEDURE — 96365 THER/PROPH/DIAG IV INF INIT: CPT

## 2018-10-07 PROCEDURE — 72148 MRI LUMBAR SPINE W/O DYE: CPT

## 2018-10-07 RX ORDER — MORPHINE SULFATE 4 MG/ML
4 INJECTION, SOLUTION INTRAMUSCULAR; INTRAVENOUS ONCE
Status: COMPLETED | OUTPATIENT
Start: 2018-10-07 | End: 2018-10-07

## 2018-10-07 RX ORDER — METHOCARBAMOL 750 MG/1
750-1500 TABLET, FILM COATED ORAL 3 TIMES DAILY
Qty: 15 TABLET | Refills: 0 | Status: SHIPPED | OUTPATIENT
Start: 2018-10-07 | End: 2018-10-12

## 2018-10-07 RX ORDER — ONDANSETRON 2 MG/ML
4 INJECTION INTRAMUSCULAR; INTRAVENOUS ONCE
Status: COMPLETED | OUTPATIENT
Start: 2018-10-07 | End: 2018-10-07

## 2018-10-07 RX ADMIN — METHOCARBAMOL 500 MG: 100 INJECTION INTRAMUSCULAR; INTRAVENOUS at 10:19

## 2018-10-07 RX ADMIN — ONDANSETRON HYDROCHLORIDE 4 MG: 2 SOLUTION INTRAMUSCULAR; INTRAVENOUS at 08:51

## 2018-10-07 RX ADMIN — MORPHINE SULFATE 4 MG: 4 INJECTION INTRAVENOUS at 11:41

## 2018-10-07 RX ADMIN — MORPHINE SULFATE 4 MG: 4 INJECTION INTRAVENOUS at 08:51

## 2018-10-07 ASSESSMENT — PAIN DESCRIPTION - LOCATION: LOCATION: NECK

## 2018-10-07 ASSESSMENT — PAIN DESCRIPTION - PROGRESSION: CLINICAL_PROGRESSION: NOT CHANGED

## 2018-10-07 ASSESSMENT — PAIN DESCRIPTION - PAIN TYPE: TYPE: ACUTE PAIN

## 2018-10-07 ASSESSMENT — PAIN SCALES - GENERAL
PAINLEVEL_OUTOF10: 10
PAINLEVEL_OUTOF10: 10

## 2018-10-07 NOTE — ED PROVIDER NOTES
C-spine. No history of c-spine surgery. Neck pain 1 week. Previous MRI 05/26/2016 [employer] [facility]. Acuity: Acute FINDINGS: BONES/ALIGNMENT: Straightening of the cervical spine without significant spondylolisthesis. There is mild degenerative endplate signal change. Marrow signal is otherwise within normal limits. There is multilevel disc desiccation. No evidence of discitis. SPINAL CORD: No abnormal cord signal is seen. SOFT TISSUES: No paraspinal mass identified. C2-C3: Mild left neural foraminal narrowing due to facet and uncovertebral hypertrophy. No significant spinal canal stenosis. C3-C4: Severe bilateral C4 neural foraminal narrowing. There is significant facet and uncovertebral hypertrophy. Diffuse disc bulging and endplate spurring. AP dimension of the spinal canal measures 7-8 mm at midline. There is near complete anterior and posterior subarachnoid effacement. C4-C5: Severe bilateral neural foraminal narrowing. Diffuse disc bulging and endplate spurring. Partial anterior and posterior subarachnoid effacement. AP dimension of the spinal canal measures 8 mm at midline. C5-C6: Severe left and moderate right neural foraminal narrowing. Diffuse disc bulging and endplate spurring. Mild spinal canal stenosis. C6-C7: Severe left and mild-moderate right neural foraminal narrowing. Mild diffuse disc bulging and endplate spurring. No significant spinal canal stenosis. C7-T1: Moderate neural foraminal narrowing. Diffuse disc bulging and endplate spurring. Anterior subarachnoid effacement. Posterior subarachnoid space appears preserved. AP dimension of the spinal canal measures 9 mm at midline. No evidence of discitis-osteomyelitis or epidural abscess. Spinal canal stenosis at C3-4 with borderline cord compression particularly on the left. This is unchanged from 2016. Severe multilevel neural foraminal narrowing.      Mri Lumbar Spine Wo Contrast    Result Date: 10/7/2018  EXAMINATION: MRI OF THE recognition software to generate this note.   Occasionally words are mistranscribed despite my efforts to edit errors.)      Bianka Mederos MD  10/07/18 [id number]

## 2018-10-08 ENCOUNTER — CARE COORDINATION (OUTPATIENT)
Dept: CARE COORDINATION | Age: 58
End: 2018-10-08

## 2018-10-15 ENCOUNTER — CARE COORDINATION (OUTPATIENT)
Dept: CARE COORDINATION | Age: 58
End: 2018-10-15

## 2018-10-15 ENCOUNTER — OFFICE VISIT (OUTPATIENT)
Dept: INTERNAL MEDICINE CLINIC | Age: 58
End: 2018-10-15

## 2018-10-15 VITALS
BODY MASS INDEX: 43.64 KG/M2 | SYSTOLIC BLOOD PRESSURE: 158 MMHG | DIASTOLIC BLOOD PRESSURE: 90 MMHG | WEIGHT: 287 LBS | HEART RATE: 76 BPM

## 2018-10-15 DIAGNOSIS — G95.20 CORD COMPRESSION (HCC): ICD-10-CM

## 2018-10-15 DIAGNOSIS — Z79.4 TYPE 2 DIABETES MELLITUS WITH DIABETIC NEUROPATHY, WITH LONG-TERM CURRENT USE OF INSULIN (HCC): Primary | ICD-10-CM

## 2018-10-15 DIAGNOSIS — E11.40 TYPE 2 DIABETES MELLITUS WITH DIABETIC NEUROPATHY, WITH LONG-TERM CURRENT USE OF INSULIN (HCC): Primary | ICD-10-CM

## 2018-10-15 DIAGNOSIS — I10 ESSENTIAL HYPERTENSION: ICD-10-CM

## 2018-10-15 DIAGNOSIS — M48.02 SPINAL STENOSIS OF CERVICAL REGION: ICD-10-CM

## 2018-10-15 PROCEDURE — 99214 OFFICE O/P EST MOD 30 MIN: CPT | Performed by: INTERNAL MEDICINE

## 2018-10-15 RX ORDER — LISINOPRIL 40 MG/1
40 TABLET ORAL DAILY
Qty: 90 TABLET | Refills: 1 | Status: SHIPPED | OUTPATIENT
Start: 2018-10-15 | End: 2019-07-02 | Stop reason: SDUPTHER

## 2018-10-15 ASSESSMENT — ENCOUNTER SYMPTOMS
BACK PAIN: 1
VOMITING: 0
NAUSEA: 0
SHORTNESS OF BREATH: 0
ABDOMINAL PAIN: 0
WHEEZING: 0
PHOTOPHOBIA: 0

## 2018-10-15 NOTE — CARE COORDINATION
This ACC met with pt briefly during OV with Dr. Dodie Gaffney.  Pt picked up Trulicity and Lantus from this office that is provided by [facility]. Pt provided limited BS readings. Pt is not writing them down, but referring to the memory in pt glucometer. This ACC thumbed through it and there was a 6 month gap of absent BS readings. This ACC will meet back with pt during OV with Dr. Dodie Gaffney on 11/12/2018 to obtain BS readings. This ACC provided handout \"Right Care. Right Place. Right Time. \"  Pt agreed to refer to handout if pt starts exhibiting sx or not feeling well. Pt aware to call this [facility] for any assistance and to call [facility] 24/7 for any concerns or health status changes.

## 2018-10-15 NOTE — PROGRESS NOTES
[id number] [facility]     Social History Main Topics    Smoking status: Never Smoker    Smokeless tobacco: Never Used    Alcohol use No    Drug use: No    Sexual activity: Not Asked     Other Topics Concern    None     Social History Narrative    None     Family History   Problem Relation Age of Onset    Lung Cancer Mother 48        + tob    Osteoarthritis Other     Cancer Other     Diabetes Other     Hypertension Other     Stroke Other     Heart Disease Other     Diabetes Maternal Grandmother        Review of Systems   Eyes: Negative for photophobia and visual disturbance. Respiratory: Negative for shortness of breath and wheezing. Cardiovascular: Negative for chest pain and palpitations. Gastrointestinal: Negative for abdominal pain, nausea and vomiting. Endocrine: Negative for polyphagia and polyuria. Genitourinary:        She denies any bladder or bowel incontinence   Musculoskeletal: Positive for back pain and neck pain. Neurological: Negative for dizziness and light-headedness. OBJECTIVE:  Pulse Readings from Last 4 Encounters:   10/15/18 76   10/07/18 100   07/13/18 72   03/29/18 103     Wt Readings from Last 4 Encounters:   10/15/18 287 lb (130.2 kg)   10/07/18 285 lb (129.3 kg)   08/20/18 292 lb (132.5 kg)   03/29/18 (!) 316 lb (143.3 kg)     BP Readings from Last 4 Encounters:   10/15/18 (!) 158/90   10/07/18 (!) 162/97   08/20/18 (!) 148/88   07/13/18 126/88     Physical Exam   Constitutional: She is oriented to person, place, and time. She appears well-developed and well-nourished. Eyes: Pupils are equal, round, and reactive to light. EOM are normal.   Cardiovascular: Normal rate, regular rhythm and normal heart sounds. Pulmonary/Chest: Effort normal and breath sounds normal. No respiratory distress. She has no wheezes. Abdominal: Soft. Bowel sounds are normal.   Neurological: She is alert and oriented to person, place, and time. No sensory deficit.  She exhibits

## 2018-11-12 ENCOUNTER — CARE COORDINATION (OUTPATIENT)
Dept: CARE COORDINATION | Age: 58
End: 2018-11-12

## 2018-11-12 NOTE — CARE COORDINATION
This ACC left message on voicemail; requested call back at number provided. Purpose of call is to obtain BS readings and to remind pt to schedule f/u appointment with Dr. Yordan Barth and JIMENEZ Beltre.

## 2018-12-06 ENCOUNTER — CARE COORDINATION (OUTPATIENT)
Dept: CARE COORDINATION | Age: 58
End: 2018-12-06

## 2018-12-19 ENCOUNTER — CARE COORDINATION (OUTPATIENT)
Dept: CARE COORDINATION | Age: 58
End: 2018-12-19

## 2018-12-26 ENCOUNTER — CARE COORDINATION (OUTPATIENT)
Dept: CARE COORDINATION | Age: 58
End: 2018-12-26

## 2018-12-26 NOTE — CARE COORDINATION
Called patient and left HIPPA compliant message with RN ACC contact information requesting call back.
Process  Medication Assistance Program:  In Process (Comment: PAP for Trulicity and Lantus)  Other Services: In Process (Comment: [address] for cost-effective diabetic testing supplies.)  Zone Management Tools:  Not Started (Comment: DM Zone Tool will be provided during OV on 11/12/2018 with Dr. Godwin Alan.)  Other Services or Interventions:  medication mgt, plant-based diet, handout \"Right Care. Right Plcae. Right Time. \" provided 10/15/2018         Goals Addressed     None          Prior to Admission medications    Medication Sig Start Date End Date Taking? Authorizing Provider   lisinopril (PRINIVIL;ZESTRIL) 40 MG tablet Take 1 tablet by mouth daily 10/15/18   M Ganga Bird MD   glucagon 1 MG injection Inject 1 mg into the skin See Admin Instructions Follow package directions for low blood sugar. 9/13/18   Sabina Roque MD   omeprazole (PRILOSEC) 20 MG delayed release capsule Take 1 capsule by mouth 2 times daily 8/20/18   Sabina Roque MD   metoprolol succinate (TOPROL XL) 50 MG extended release tablet Take 1 tablet by mouth daily 7/13/18   Loi Bird MD   metFORMIN (GLUCOPHAGE) 1000 MG tablet Take 1 tablet by mouth daily (with breakfast) 7/13/18   Sabina Roque MD   Dulaglutide (TRULICITY) 1.5 EY/8.9VS SOPN Inject 1.5 mg into the skin once a week 6/18/18   M Izaiah Villalta MD   glucose blood VI test strips (FREESTYLE LITE) strip 1 each by In Vitro route daily As needed. 3/29/18   Nicolás Goss APRN - CNP   insulin glargine (LANTUS SOLOSTAR) 100 UNIT/ML injection pen INJECT 50 UNITS INTO THE SKIN 2 TIMES DAILY 3/20/18   M Izaiah Villalta MD   isosorbide mononitrate (IMDUR) 60 MG extended release tablet Take 1 tablet by mouth daily 3/5/18   Monica Rivera MD   nitroGLYCERIN (NITROSTAT) 0.4 MG SL tablet Place 1 tablet under the tongue every 5 minutes as needed for Chest pain up to max of 3 total doses.  If no relief after 1 dose, call 911. 2/16/18   Monica Rivera MD

## 2019-01-31 ENCOUNTER — CARE COORDINATION (OUTPATIENT)
Dept: CARE COORDINATION | Age: 59
End: 2019-01-31

## 2019-03-01 ENCOUNTER — CARE COORDINATION (OUTPATIENT)
Dept: CARE COORDINATION | Age: 59
End: 2019-03-01

## 2019-04-04 ENCOUNTER — CARE COORDINATION (OUTPATIENT)
Dept: CARE COORDINATION | Age: 59
End: 2019-04-04

## 2019-04-04 NOTE — CARE COORDINATION
RNCC attempted for a 3rd and final time to reach the patient to follow up on her DM/HTN. Pt has left multiple messages with no return call received. Writer will sign off the care team at this time. If any needs in the future Dr. Landy Cabrera can reach out to [address] [place] [address].

## 2019-04-15 RX ORDER — NITROGLYCERIN 0.4 MG/1
TABLET SUBLINGUAL
Qty: 25 TABLET | Refills: 0 | Status: SHIPPED | OUTPATIENT
Start: 2019-04-15 | End: 2019-04-23 | Stop reason: SDUPTHER

## 2019-04-15 NOTE — TELEPHONE ENCOUNTER
Ordering Physician:JOHN    Last lab's:10/7/18    Last visit :3/29/18    Next Visit :none    Last fill:2/16/18  Requested Prescriptions     Pending Prescriptions Disp Refills    nitroGLYCERIN (NITROSTAT) 0.4 MG SL tablet [Pharmacy Med Name: NITROGLYCERIN 0.4 MG TABLET SL] 25 tablet 0     Sig: PLACE 1 TABLET UNDER TONGUE EVERY 5 MINUTES AS NEEDED FOR CHEST PAIN. MAX OF 3 DOSES     LVM to seen.

## 2019-04-23 ENCOUNTER — OFFICE VISIT (OUTPATIENT)
Dept: INTERNAL MEDICINE CLINIC | Age: 59
End: 2019-04-23
Payer: COMMERCIAL

## 2019-04-23 VITALS
WEIGHT: 293 LBS | HEART RATE: 96 BPM | HEIGHT: 68 IN | DIASTOLIC BLOOD PRESSURE: 100 MMHG | SYSTOLIC BLOOD PRESSURE: 150 MMHG | BODY MASS INDEX: 44.41 KG/M2

## 2019-04-23 DIAGNOSIS — E08.21 DIABETES MELLITUS DUE TO UNDERLYING CONDITION WITH DIABETIC NEPHROPATHY, WITH LONG-TERM CURRENT USE OF INSULIN (HCC): ICD-10-CM

## 2019-04-23 DIAGNOSIS — Z79.4 TYPE 2 DIABETES MELLITUS WITH DIABETIC NEUROPATHY, WITH LONG-TERM CURRENT USE OF INSULIN (HCC): Primary | ICD-10-CM

## 2019-04-23 DIAGNOSIS — Z01.818 PRE-OP EXAM: ICD-10-CM

## 2019-04-23 DIAGNOSIS — H26.9 CATARACT OF BOTH EYES, UNSPECIFIED CATARACT TYPE: ICD-10-CM

## 2019-04-23 DIAGNOSIS — I10 ESSENTIAL HYPERTENSION: ICD-10-CM

## 2019-04-23 DIAGNOSIS — I25.118 CORONARY ARTERY DISEASE OF NATIVE HEART WITH STABLE ANGINA PECTORIS, UNSPECIFIED VESSEL OR LESION TYPE (HCC): ICD-10-CM

## 2019-04-23 DIAGNOSIS — Z79.4 DIABETES MELLITUS DUE TO UNDERLYING CONDITION WITH DIABETIC NEPHROPATHY, WITH LONG-TERM CURRENT USE OF INSULIN (HCC): ICD-10-CM

## 2019-04-23 DIAGNOSIS — R07.9 CHEST PAIN, UNSPECIFIED TYPE: ICD-10-CM

## 2019-04-23 DIAGNOSIS — Z79.4 TYPE 2 DIABETES MELLITUS WITH DIABETIC NEUROPATHY, WITH LONG-TERM CURRENT USE OF INSULIN (HCC): ICD-10-CM

## 2019-04-23 DIAGNOSIS — E11.40 TYPE 2 DIABETES MELLITUS WITH DIABETIC NEUROPATHY, WITH LONG-TERM CURRENT USE OF INSULIN (HCC): Primary | ICD-10-CM

## 2019-04-23 DIAGNOSIS — E11.40 TYPE 2 DIABETES MELLITUS WITH DIABETIC NEUROPATHY, WITH LONG-TERM CURRENT USE OF INSULIN (HCC): ICD-10-CM

## 2019-04-23 LAB
ALBUMIN SERPL-MCNC: 4.2 G/DL (ref 3.4–5)
ALP BLD-CCNC: 114 U/L (ref 40–129)
ALT SERPL-CCNC: 9 U/L (ref 10–40)
ANION GAP SERPL CALCULATED.3IONS-SCNC: 12 MMOL/L (ref 3–16)
AST SERPL-CCNC: 15 U/L (ref 15–37)
BILIRUB SERPL-MCNC: <0.2 MG/DL (ref 0–1)
BILIRUBIN DIRECT: <0.2 MG/DL (ref 0–0.3)
BILIRUBIN, INDIRECT: ABNORMAL MG/DL (ref 0–1)
BUN BLDV-MCNC: 12 MG/DL (ref 7–20)
CALCIUM SERPL-MCNC: 9.2 MG/DL (ref 8.3–10.6)
CHLORIDE BLD-SCNC: 103 MMOL/L (ref 99–110)
CO2: 29 MMOL/L (ref 21–32)
CREAT SERPL-MCNC: 0.8 MG/DL (ref 0.6–1.2)
CREATININE URINE: 478.2 MG/DL (ref 28–259)
GFR AFRICAN AMERICAN: >60
GFR NON-AFRICAN AMERICAN: >60
GLUCOSE BLD-MCNC: 167 MG/DL (ref 70–99)
HCT VFR BLD CALC: 37.2 % (ref 36–48)
HEMOGLOBIN: 12.4 G/DL (ref 12–16)
MCH RBC QN AUTO: 29.3 PG (ref 26–34)
MCHC RBC AUTO-ENTMCNC: 33.4 G/DL (ref 31–36)
MCV RBC AUTO: 87.7 FL (ref 80–100)
MICROALBUMIN UR-MCNC: 8.2 MG/DL
MICROALBUMIN/CREAT UR-RTO: 17.1 MG/G (ref 0–30)
PDW BLD-RTO: 14.3 % (ref 12.4–15.4)
PLATELET # BLD: 304 K/UL (ref 135–450)
PMV BLD AUTO: 8.3 FL (ref 5–10.5)
POTASSIUM SERPL-SCNC: 3.9 MMOL/L (ref 3.5–5.1)
RBC # BLD: 4.23 M/UL (ref 4–5.2)
SODIUM BLD-SCNC: 144 MMOL/L (ref 136–145)
TOTAL PROTEIN: 7.6 G/DL (ref 6.4–8.2)
WBC # BLD: 6.2 K/UL (ref 4–11)

## 2019-04-23 PROCEDURE — 93000 ELECTROCARDIOGRAM COMPLETE: CPT | Performed by: INTERNAL MEDICINE

## 2019-04-23 PROCEDURE — 99244 OFF/OP CNSLTJ NEW/EST MOD 40: CPT | Performed by: INTERNAL MEDICINE

## 2019-04-23 RX ORDER — NITROGLYCERIN 0.4 MG/1
TABLET SUBLINGUAL
Qty: 25 TABLET | Refills: 0 | Status: SHIPPED | OUTPATIENT
Start: 2019-04-23 | End: 2020-02-13

## 2019-04-23 RX ORDER — METOPROLOL SUCCINATE 50 MG/1
50 TABLET, EXTENDED RELEASE ORAL DAILY
Qty: 90 TABLET | Refills: 0 | Status: SHIPPED | OUTPATIENT
Start: 2019-04-23 | End: 2019-05-29 | Stop reason: CLARIF

## 2019-04-23 NOTE — PROGRESS NOTES
Preoperative Consultation      Corrine Davis  YOB: 1960    Date of Service:  4/23/2019    Vitals:    04/23/19 1105 04/23/19 1114   BP: (!) 144/98 (!) 150/100   Site: Left Upper Arm Left Upper Arm   Position: Sitting Sitting   Cuff Size: Large Adult Large Adult   Pulse: 80 96   Weight: 294 lb (133.4 kg)    Height: 5' 8\" (1.727 m)       Wt Readings from Last 2 Encounters:   04/23/19 294 lb (133.4 kg)   10/15/18 287 lb (130.2 kg)     BP Readings from Last 3 Encounters:   04/23/19 (!) 150/100   10/15/18 (!) 158/90   10/07/18 (!) 162/97        Chief Complaint   Patient presents with   [facility] Pre-op Exam     cataract surgery 4-30-19    Other     seeing dr. Maddie Bradley for pain management--has not been seeing Dr. Tristan Bhatt for diabetes     Allergies   Allergen Reactions    Benadryl [Diphenhydramine Hcl] Other (See Comments)     Jittery feeling    Zolpidem Tartrate [Zolpidem Tartrate Er] Other (See Comments)     Jittery feeling    Diphenhydramine Nausea And Vomiting     Jittery feeling     Outpatient Medications Marked as Taking for the 4/23/19 encounter (Office Visit) with John Christine MD   Medication Sig Dispense Refill    Dulaglutide (TRULICITY) 1.5 HH/4.3IY SOPN Inject 1.5 mg into the skin once a week Indications: Diabetes 4 pen 1    nitroGLYCERIN (NITROSTAT) 0.4 MG SL tablet PLACE 1 TABLET UNDER TONGUE EVERY 5 MINUTES AS NEEDED FOR CHEST PAIN. MAX OF 3 DOSES 25 tablet 0    metoprolol succinate (TOPROL XL) 50 MG extended release tablet Take 1 tablet by mouth daily 90 tablet 0    blood glucose test strips (FREESTYLE LITE) strip 1 each by In Vitro route daily As needed.  100 each 5    lisinopril (PRINIVIL;ZESTRIL) 40 MG tablet Take 1 tablet by mouth daily 90 tablet 1    metFORMIN (GLUCOPHAGE) 1000 MG tablet Take 1 tablet by mouth daily (with breakfast) 90 tablet 1    insulin glargine (LANTUS SOLOSTAR) 100 UNIT/ML injection pen INJECT 50 UNITS INTO THE SKIN 2 TIMES DAILY 30 pen 1    aspirin 81 MG chewable tablet TAKE 1 TABLET BY MOUTH DAILY 30 tablet 5    oxyCODONE-acetaminophen (PERCOCET) 7.5-325 MG per tablet Take 1 tablet by mouth 4 times daily .  albuterol sulfate HFA (PROAIR HFA) 108 (90 Base) MCG/ACT inhaler Inhale 2 puffs into the lungs every 6 hours as needed for Wheezing 1 Inhaler 3    insulin lispro (HUMALOG KWIKPEN) 100 UNIT/ML pen Inject 10 Units into the skin 3 times daily (before meals) plus sliding scale. Max daily dose 90 units. 9 Pen 3       This patient presents to the office today for a preoperative consultation at the request of surgeon, Dr. Chantel Garcia, who plans on performing cataract surgery on May 14 at [facility].  The current problem began 2 years ago, and symptoms have been worsening with time. Conservative therapy: N/A.     Planned anesthesia: Local   Known anesthesia problems: None   Bleeding risk: No recent or remote history of abnormal bleeding  Personal or FH of DVT/PE: No    Patient objection to receiving blood products: No  Current Functional Status:moderate  Patient Active Problem List   Diagnosis    [facility]el[facility]) [facility] [facility]    Arthritis    Osteomyelitis of spine (HCC)    Diskitis    Opiate analgesic use agreement exists    Type 2 diabetes mellitus with diabetic neuropathy (HCC)    Insomnia    Submucous leiomyoma of uterus    PMB (postmenopausal bleeding)    Adiposity    Osteoarthritis of hand    Urticaria    Muscle spasms of both lower extremities    Mild intermittent asthma without complication    Chest pain    Abnormal EKG    Essential hypertension    Age-related cataract of both eyes    Abnormal myocardial perfusion study    Familial hypercholesterolemia    Pneumonia    Recurrent major depressive disorder, in full remission ([facility] [address] [facility]       Past Medical History:   Diagnosis Date    Anxiety     Arthritis     Asthma     as child    Bilateral chronic knee pain     sees pain management    Degenerative disc disease, cervical MRI 2011    Multilevel cervical degenerative this disease most significant at the C7-T1 level where there is probable impingement of the exiting right C8 nerve root caused by disc herniation.     Depression     Diabetes     [facility]is Manuel Galan, [facility] at [facility]    High blood pressure     Hypertriglyceridemia     Lumbar herniated disc MRI 2011    L5-S1    Morbid obesity (HCC)     Opiate dependence ([facility] [address]     Osteomyelitis (HCC)     Pneumonia     as child     Past Surgical History:   Procedure Laterality Date    CARDIAC CATHETERIZATION  11/2017    1.  Diffusely diseased distal LAD.  Distal LAD has a 50% narrowing in two ---     Family History   Problem Relation Age of Onset    Lung Cancer Mother 48        + tob    Osteoarthritis Other     Cancer Other     Diabetes Other     Hypertension Other     Stroke Other     Heart Disease Other     Diabetes Maternal Grandmother      Social History     Socioeconomic History    Marital status:      Spouse name: Not on file    Number of children: 3    Years of education: Not on file    Highest education level: Not on file   Occupational History    Occupation: /Masters     Comment: family [employer]   Social Needs    Financial resource strain: Not on file    Food insecurity:     Worry: Not on file     Inability: Not on file    Transportation needs:     Medical: Not on file     Non-medical: Not on file   Tobacco Use    Smoking status: Never Smoker    Smokeless tobacco: Never Used   Substance and Sexual Activity    Alcohol use: No     Alcohol/week: 0.0 oz    Drug use: No    Sexual activity: Not on file   Lifestyle    Physical activity:     Days per week: Not on file     Minutes per session: Not on file    Stress: Not on file   Relationships    Social connections:     Talks on phone: Not on file     Gets together: Not on file     Attends Sikh service: Not on file     Active member of club or organization: Not on file     Attends meetings of clubs or organizations: Not on file     Relationship status: Not on file    Intimate partner violence:     Fear of current or ex partner: Not on file     Emotionally abused: Not on file     Physically abused: Not on file     Forced sexual activity: Not on file   Other Topics Concern    Not on file   Social History Narrative    Not on file       Review of Systems  A comprehensive review of systems was negative except for what was noted in the HPI. Blood sugar running from +  Not taking trulicity and metoprolol  Last chest pain 1 week ago. No active chest pain today  H/o small vessels cardiac disease. History of chronic back pain and knee pain and see pain management specialist      Physical Exam   Constitutional: She is oriented to person, place, and time. She appears well-developed and well-nourished. No distress. +ve obesity  HENT:   Head: Normocephalic and atraumatic. Mouth/Throat: Uvula is midline, oropharynx is clear and moist and mucous membranes are normal.   Eyes: Conjunctivae and EOM are normal. Pupils are equal, round, and reactive to light. Neck: Trachea normal and normal range of motion. Neck supple. No JVD present. Carotid bruit is not present. No mass and no thyromegaly present. Cardiovascular: Normal rate, regular rhythm, normal heart sounds and intact distal pulses. Exam reveals no gallop and no friction rub. No murmur heard. Pulmonary/Chest: Effort normal and breath sounds normal. No respiratory distress. She has no wheezes. She has no rales. Abdominal: Soft. Normal aorta and bowel sounds are normal. She exhibits no distension and no mass. There is no hepatosplenomegaly. No tenderness. Musculoskeletal: She exhibits no edema and +ve bilateral knee tenderness. Neurological: She is alert and oriented to person, place, and time. She has normal strength. No cranial nerve deficit or sensory deficit. Skin: Skin is warm and dry. No rash noted. No erythema.    Psychiatric: She has a normal mood and affect. Her behavior is normal.     EKG Interpretation:   Normal sinus rhythm. Ventricular rate 87/m. No specificT wave abnormalities in V4 and V5 V6 and lateral leads, which appears new in comparison with the EKG of last year. Lab Review : pending         Assessment:       62 y.o. patient with planned surgery as above. Known risk factors for perioperative complications: Coronary artery disease, Diabetes mellitus, Hypertension  Perioperative risk related to the patient's upcoming surgery is considered moderate to high. Pre-op exam was completed on  04/23/2019 at 11 am  Current medications which may produce withdrawal symptoms if withheld perioperatively: Percocet and Lyrica      Plan:     1. Preoperative workup as follows: ECG, hemoglobin, hematocrit, electrolytes, creatinine, glucose, liver function studies, a1c  2. Change in medication regimen before surgery: None/pending labs  3. Deep vein thrombosis prophylaxis: regimen to be chosen by surgical team  4. Pending clearance  5.   She will need cardiology clearance

## 2019-04-24 LAB
ESTIMATED AVERAGE GLUCOSE: 223.1 MG/DL
HBA1C MFR BLD: 9.4 %

## 2019-04-25 ENCOUNTER — TELEPHONE (OUTPATIENT)
Dept: RHEUMATOLOGY | Age: 59
End: 2019-04-25

## 2019-04-25 NOTE — TELEPHONE ENCOUNTER
I received a fax back from [employer] stating all patient's must meet certain criteria. I am attaching this to this encounter. Please review and let me know if anything will be discontinued if Trulicity is approved. Please advise.

## 2019-05-09 ENCOUNTER — OFFICE VISIT (OUTPATIENT)
Dept: CARDIOLOGY CLINIC | Age: 59
End: 2019-05-09
Payer: COMMERCIAL

## 2019-05-09 VITALS
BODY MASS INDEX: 45.01 KG/M2 | HEART RATE: 71 BPM | WEIGHT: 293 LBS | SYSTOLIC BLOOD PRESSURE: 110 MMHG | DIASTOLIC BLOOD PRESSURE: 80 MMHG

## 2019-05-09 DIAGNOSIS — R94.39 ABNORMAL MYOCARDIAL PERFUSION STUDY: ICD-10-CM

## 2019-05-09 DIAGNOSIS — I20.8 STABLE ANGINA PECTORIS (HCC): Primary | ICD-10-CM

## 2019-05-09 DIAGNOSIS — G47.09 OTHER INSOMNIA: ICD-10-CM

## 2019-05-09 PROCEDURE — 99214 OFFICE O/P EST MOD 30 MIN: CPT | Performed by: INTERNAL MEDICINE

## 2019-05-09 PROCEDURE — 93000 ELECTROCARDIOGRAM COMPLETE: CPT | Performed by: INTERNAL MEDICINE

## 2019-05-09 ASSESSMENT — ENCOUNTER SYMPTOMS
ALLERGIC/IMMUNOLOGIC NEGATIVE: 1
GASTROINTESTINAL NEGATIVE: 1
EYES NEGATIVE: 1
SHORTNESS OF BREATH: 1

## 2019-05-10 NOTE — TELEPHONE ENCOUNTER
Received DENIAL for Trulicity 5.2OR/9.7CQ pen-injectors. Denial letter attached. Please advise patient. Thank you.

## 2019-05-29 ENCOUNTER — OFFICE VISIT (OUTPATIENT)
Dept: INTERNAL MEDICINE CLINIC | Age: 59
End: 2019-05-29
Payer: COMMERCIAL

## 2019-05-29 VITALS
DIASTOLIC BLOOD PRESSURE: 86 MMHG | HEIGHT: 68 IN | BODY MASS INDEX: 44.41 KG/M2 | WEIGHT: 293 LBS | HEART RATE: 84 BPM | SYSTOLIC BLOOD PRESSURE: 138 MMHG

## 2019-05-29 DIAGNOSIS — I10 ESSENTIAL HYPERTENSION: Primary | ICD-10-CM

## 2019-05-29 DIAGNOSIS — R00.2 PALPITATION: ICD-10-CM

## 2019-05-29 DIAGNOSIS — I25.118 CORONARY ARTERY DISEASE OF NATIVE HEART WITH STABLE ANGINA PECTORIS, UNSPECIFIED VESSEL OR LESION TYPE (HCC): ICD-10-CM

## 2019-05-29 DIAGNOSIS — E11.40 TYPE 2 DIABETES MELLITUS WITH DIABETIC NEUROPATHY, WITH LONG-TERM CURRENT USE OF INSULIN (HCC): ICD-10-CM

## 2019-05-29 DIAGNOSIS — Z79.4 TYPE 2 DIABETES MELLITUS WITH DIABETIC NEUROPATHY, WITH LONG-TERM CURRENT USE OF INSULIN (HCC): ICD-10-CM

## 2019-05-29 PROCEDURE — 99214 OFFICE O/P EST MOD 30 MIN: CPT | Performed by: INTERNAL MEDICINE

## 2019-05-29 RX ORDER — ALBUTEROL SULFATE 90 UG/1
2 AEROSOL, METERED RESPIRATORY (INHALATION) EVERY 6 HOURS PRN
Qty: 1 INHALER | Refills: 3 | Status: SHIPPED | OUTPATIENT
Start: 2019-05-29 | End: 2022-10-28 | Stop reason: SDUPTHER

## 2019-05-29 RX ORDER — METOPROLOL TARTRATE 50 MG/1
50 TABLET, FILM COATED ORAL 2 TIMES DAILY
Qty: 60 TABLET | Refills: 5 | Status: SHIPPED | OUTPATIENT
Start: 2019-05-29 | End: 2020-07-06

## 2019-05-29 NOTE — PROGRESS NOTES
Magan Francis  1960  female  62 y.o. SUBJECTIVE:       Chief Complaint   Patient presents with    1 Month Follow-Up    Hypertension     sometimes taking twice daily due to palpitations-    Diabetes    Other     sweaty, nauseated and sob--taking NTG. saw cardiologist       HPI:  Follow-up visit for chronic problems. Patient blood sugars run from 72- 300. She is taking Lantus 15 units twice daily as well as short-acting insulin. She has not been following diet regularly. She has recently evaluated by cardiologist.  She still get occasional palpitation and chest pressure and sweating. Last episode about 2 weeks ago. She is requesting metoprolol twice daily. She had tried long-acting metoprolol twice daily and found benefit out of it. She feels her shortness of breath is stable,use albuterol as needed. Past Medical History:   Diagnosis Date    Anxiety     Arthritis     Asthma     as child    Bilateral chronic knee pain     sees pain management    Degenerative disc disease, cervical MRI 2011    Multilevel cervical degenerative this disease most significant at the C7-T1 level where there is probable impingement of the exiting right C8 nerve root caused by disc herniation.     Depression     Diabetes     Real Muhammad, NP at [facility]    High blood pressure     Hypertriglyceridemia     Lumbar herniated disc MRI 2011    L5-S1    Morbid obesity (HCC)     Opiate dependence ([facility] [address]     Osteomyelitis (HCC)     Pneumonia     as child     Past Surgical History:   Procedure Laterality Date    CARDIAC CATHETERIZATION  11/2017    1.  Diffusely diseased distal LAD.  Distal LAD has a 50% narrowing in two ---     Social History     Socioeconomic History    Marital status:      Spouse name: None    Number of children: 4    Years of education: None    Highest education level: None   Occupational History    Occupation: /Masters     Comment: [employer]   [employer] [place] Needs    Financial resource strain: None    Food insecurity:     Worry: None     Inability: None    Transportation needs:     Medical: None     Non-medical: None   Tobacco Use    Smoking status: Never Smoker    Smokeless tobacco: Never Used   Substance and Sexual Activity    Alcohol use: No     Alcohol/week: 0.0 oz    Drug use: No    Sexual activity: None   Lifestyle    Physical activity:     Days per week: None     Minutes per session: None    Stress: None   Relationships    Social connections:     Talks on phone: None     Gets together: None     Attends Congregation service: None     Active member of club or organization: None     Attends meetings of clubs or organizations: None     Relationship status: None    Intimate partner violence:     Fear of current or ex partner: None     Emotionally abused: None     Physically abused: None     Forced sexual activity: None   Other Topics Concern    None   Social History Narrative    None     Family History   Problem Relation Age of Onset    Lung Cancer Mother 48        + tob    Osteoarthritis Other     Cancer Other     Diabetes Other     Hypertension Other     Stroke Other     Heart Disease Other     Diabetes Maternal Grandmother        Review of Systems  The patient denies abdominal or flank pain, anorexia, nausea or vomiting, dysphagia, change in bowel habits or black or bloody stools or weight loss. The patient denies any symptoms of neurological impairment or TIA's; no amaurosis, diplopia, dysphasia, or unilateral disturbance of motor or sensory function. No loss of balance or vertigo.       OBJECTIVE:  Pulse Readings from Last 4 Encounters:   05/29/19 84   05/09/19 71   04/23/19 96   10/15/18 76     Wt Readings from Last 4 Encounters:   05/29/19 297 lb (134.7 kg)   05/09/19 296 lb (134.3 kg)   04/23/19 294 lb (133.4 kg)   10/15/18 287 lb (130.2 kg)     BP Readings from Last 4 Encounters:   05/29/19 138/86   05/09/19 110/80   04/23/19 (!) 150/100 10/15/18 (!) 158/90     Physical Exam  Physical Exam:    Gen: +ve obese, no acute distress  Head: normocephalic, atraumatic  Eyes: no eye discharge,no icterus, no conjunctival erythema. Pupils reactive to light. ENT: Moist mucous membranes, normal nose, no oropharyngeal erythema  Neck: supple, no masses, no palpable enlarged thyroid. CV: regular rate and rhythm, no murmurs, rubs, gallops. Pedal pulses 2+, symmetric  Resp: Normal effort, clear to auscultation bilaterally. No wheezing or rhonchi is noted today. Abd: +Bowel Sounds, soft, non tender to palpation. Psych: normal mood and affect. Appropriately oriented. Sensory exam of the foot is normal, tested with the monofilament. Good pulses, no lesions or ulcers, good peripheral pulses.       CBC:   Lab Results   Component Value Date    WBC 6.2 04/23/2019    HGB 12.4 04/23/2019    HCT 37.2 04/23/2019     04/23/2019     CMP:  Lab Results   Component Value Date     04/23/2019    K 3.9 04/23/2019    K 4.2 10/07/2018     04/23/2019    CO2 29 04/23/2019    ANIONGAP 12 04/23/2019    GLUCOSE 167 04/23/2019    BUN 12 04/23/2019    CREATININE 0.8 04/23/2019    GFRAA >60 04/23/2019    CALCIUM 9.2 04/23/2019    PROT 7.6 04/23/2019    LABALBU 4.2 04/23/2019    AGRATIO 1.1 10/07/2018    BILITOT <0.2 04/23/2019    ALKPHOS 114 04/23/2019    ALT 9 04/23/2019    AST 15 04/23/2019    GLOB 3.8 10/07/2018     URINALYSIS:  Lab Results   Component Value Date    GLUCOSEU >=1000 08/20/2018    KETUA Negative 08/20/2018    SPECGRAV >1.030 08/20/2018    BLOODU Negative 08/20/2018    PHUR 5.5 08/20/2018    PROTEINU Negative 08/20/2018    NITRU Negative 08/20/2018    LEUKOCYTESUR Negative 08/20/2018    LABMICR 8.20 04/23/2019    LABMICR Not Indicated 08/20/2018    URINETYPE Not Specified 08/20/2018     HBA1C:   Lab Results   Component Value Date    LABA1C 9.4 04/23/2019    .1 04/23/2019     MICRO/ALB:   Lab Results   Component Value Date    LABMICR 8.20 04/23/2019    LABMICR Not Indicated 08/20/2018    LABCREA 478.2 04/23/2019    MALBCR 17.1 04/23/2019     LIPID:  Lab Results   Component Value Date    CHOL 121 11/06/2017    TRIG 94 11/06/2017    HDL 60 11/06/2017    LDLCALC 42 11/06/2017    LABVLDL 19 11/06/2017     TSH:   Lab Results   Component Value Date    TSHREFLEX 0.89 01/23/2017         ASSESSMENT/PLAN:    Jose Washburn was seen today for 1 month follow-up, hypertension, diabetes and other. Diagnoses and all orders for this visit:    Essential hypertension  -     metoprolol tartrate (LOPRESSOR) 50 MG tablet; Take 1 tablet by mouth 2 times daily    Type 2 diabetes mellitus with diabetic neuropathy, with long-term current use of insulin (HCC)  increase-     insulin glargine (LANTUS SOLOSTAR) 100 UNIT/ML injection pen; INJECT 55 UNITS INTO THE SKIN 2 TIMES DAILY  -     Diabetic Foot Exam  Patient is going to make appointment for diabetic education class  Palpitation  -     metoprolol tartrate (LOPRESSOR) 50 MG tablet; Take 1 tablet by mouth 2 times daily    Coronary artery disease of native heart with stable angina pectoris, unspecified vessel or lesion type (HCC)  -     metoprolol tartrate (LOPRESSOR) 50 MG tablet; Take 1 tablet by mouth 2 times daily    Other orders  -     albuterol sulfate HFA (PROAIR HFA) 108 (90 Base) MCG/ACT inhaler;  Inhale 2 puffs into the lungs every 6 hours as needed for Wheezing            Orders Placed This Encounter   Procedures    Diabetic Foot Exam     Current Outpatient Medications   Medication Sig Dispense Refill    albuterol sulfate HFA (PROAIR HFA) 108 (90 Base) MCG/ACT inhaler Inhale 2 puffs into the lungs every 6 hours as needed for Wheezing 1 Inhaler 3    insulin glargine (LANTUS SOLOSTAR) 100 UNIT/ML injection pen INJECT 55 UNITS INTO THE SKIN 2 TIMES DAILY 30 pen 1    metoprolol tartrate (LOPRESSOR) 50 MG tablet Take 1 tablet by mouth 2 times daily 60 tablet 5    nitroGLYCERIN (NITROSTAT) 0.4 MG SL tablet PLACE 1 TABLET UNDER TONGUE EVERY 5 MINUTES AS NEEDED FOR CHEST PAIN. MAX OF 3 DOSES 25 tablet 0    lisinopril (PRINIVIL;ZESTRIL) 40 MG tablet Take 1 tablet by mouth daily 90 tablet 1    omeprazole (PRILOSEC) 20 MG delayed release capsule Take 1 capsule by mouth 2 times daily (Patient taking differently: Take 20 mg by mouth 2 times daily as needed ) 60 capsule 0    metFORMIN (GLUCOPHAGE) 1000 MG tablet Take 1 tablet by mouth daily (with breakfast) 90 tablet 1    aspirin 81 MG chewable tablet TAKE 1 TABLET BY MOUTH DAILY 30 tablet 5    insulin lispro (HUMALOG KWIKPEN) 100 UNIT/ML pen Inject 10 Units into the skin 3 times daily (before meals) plus sliding scale. Max daily dose 90 units. 9 Pen 3    blood glucose test strips (FREESTYLE LITE) strip 1 each by In Vitro route daily As needed. 100 each 5    glucagon 1 MG injection Inject 1 mg into the skin See Admin Instructions Follow package directions for low blood sugar. 1 kit 3    oxyCODONE-acetaminophen (PERCOCET) 7.5-325 MG per tablet Take 1 tablet by mouth 4 times daily .  UNABLE TO FIND Glucometer #1, E11.9, testing TID. ON INSULIN. Home pt. DISPENSE METER BEST COVERED BY INSURANCE 1 each 0    Lancets MISC #300  DAYS, TESTING TID, E11.9 300 each 3    UNABLE TO FIND GLUCOSE TEST STRIPS, TESTING TID, ON INSULIN, E11.9 #300  DAYS  DISPENSE STRIPS BEST COVERED BY INSURANCE 300 each 3    glucose monitoring kit (FREESTYLE) monitoring kit 1 kit by Does not apply route daily 1 kit 0    Insulin Pen Needle 32G X 4 MM MISC Use to give insulin 4 x daily and with sliding scale 150 each 5    Lancet Devices (ACCU-CHEK SOFTCLIX LANCET DEV) MISC 1 Units by Does not apply route 4 times daily (before meals and nightly) 100 each 3    SOFT TOUCH LANCETS MISC 1 strip by Does not apply route 4 times daily 100 each 3     No current facility-administered medications for this visit. Return in about 3 months (around 8/29/2019).   An After Visit Summary was printed and given to the patient. Documentation was done using voice recognition dragon software. Every effort was made to ensure accuracy; however, inadvertent  Unintentional computerized transcription errors may be present.

## 2019-09-19 ENCOUNTER — OFFICE VISIT (OUTPATIENT)
Dept: CARDIOLOGY CLINIC | Age: 59
End: 2019-09-19
Payer: COMMERCIAL

## 2019-09-19 VITALS
HEART RATE: 80 BPM | BODY MASS INDEX: 46.22 KG/M2 | SYSTOLIC BLOOD PRESSURE: 138 MMHG | DIASTOLIC BLOOD PRESSURE: 88 MMHG | WEIGHT: 293 LBS

## 2019-09-19 DIAGNOSIS — I25.10 CORONARY ARTERY DISEASE INVOLVING NATIVE CORONARY ARTERY OF NATIVE HEART WITHOUT ANGINA PECTORIS: ICD-10-CM

## 2019-09-19 DIAGNOSIS — E78.2 MIXED HYPERLIPIDEMIA: ICD-10-CM

## 2019-09-19 DIAGNOSIS — I10 ESSENTIAL HYPERTENSION: Primary | ICD-10-CM

## 2019-09-19 PROCEDURE — 99213 OFFICE O/P EST LOW 20 MIN: CPT | Performed by: INTERNAL MEDICINE

## 2019-09-19 ASSESSMENT — ENCOUNTER SYMPTOMS
EYES NEGATIVE: 1
ALLERGIC/IMMUNOLOGIC NEGATIVE: 1
GASTROINTESTINAL NEGATIVE: 1
SHORTNESS OF BREATH: 1

## 2019-09-19 NOTE — PROGRESS NOTES
mellitus with diabetic neuropathy (HCC)    Insomnia    Submucous leiomyoma of uterus    PMB (postmenopausal bleeding)    Adiposity    Osteoarthritis of hand    Urticaria    Muscle spasms of both lower extremities    Mild intermittent asthma without complication    Chest pain    Abnormal EKG    Essential hypertension    Age-related cataract of both eyes    Abnormal myocardial perfusion study    Familial hypercholesterolemia    Pneumonia    Recurrent major depressive disorder, in full remission ([facility] [address]           Plan:      CAD; Not critical at angiogram but medical therapy is limited as she sweats with exertion but has no chest pain. She is diabetic she was told to take her medications in the AM to help control chest discomfort. LVEF 32% on MPI recently. Not taking imdur due to headache and would have her take toprol in the AM.  No orthopnea. Will add nitrodur patch. PNA:  Treated for PNeumonia in 2/20/18. No problems with cataract surgery. She has SHAYNA clinically and still needs a sleep study.

## 2019-09-30 ENCOUNTER — TELEPHONE (OUTPATIENT)
Dept: CARDIOLOGY CLINIC | Age: 59
End: 2019-09-30

## 2019-09-30 RX ORDER — ISOSORBIDE MONONITRATE 30 MG/1
30 TABLET, EXTENDED RELEASE ORAL DAILY
Qty: 30 TABLET | Refills: 3 | Status: SHIPPED | OUTPATIENT
Start: 2019-09-30 | End: 2019-09-30 | Stop reason: SINTOL

## 2019-10-01 RX ORDER — AMLODIPINE BESYLATE 5 MG/1
5 TABLET ORAL DAILY
Qty: 30 TABLET | Refills: 5 | Status: SHIPPED | OUTPATIENT
Start: 2019-10-01 | End: 2020-04-06

## 2019-10-09 ENCOUNTER — TELEPHONE (OUTPATIENT)
Dept: INTERNAL MEDICINE CLINIC | Age: 59
End: 2019-10-09

## 2019-10-09 DIAGNOSIS — Z79.4 TYPE 2 DIABETES MELLITUS WITH DIABETIC NEUROPATHY, WITH LONG-TERM CURRENT USE OF INSULIN (HCC): ICD-10-CM

## 2019-10-09 DIAGNOSIS — E11.40 TYPE 2 DIABETES MELLITUS WITH DIABETIC NEUROPATHY, WITH LONG-TERM CURRENT USE OF INSULIN (HCC): ICD-10-CM

## 2019-10-10 ENCOUNTER — OFFICE VISIT (OUTPATIENT)
Dept: INTERNAL MEDICINE CLINIC | Age: 59
End: 2019-10-10
Payer: COMMERCIAL

## 2019-10-10 VITALS
HEART RATE: 90 BPM | WEIGHT: 293 LBS | DIASTOLIC BLOOD PRESSURE: 98 MMHG | SYSTOLIC BLOOD PRESSURE: 154 MMHG | HEIGHT: 68 IN | BODY MASS INDEX: 44.41 KG/M2

## 2019-10-10 DIAGNOSIS — I10 ESSENTIAL HYPERTENSION: ICD-10-CM

## 2019-10-10 DIAGNOSIS — E11.40 TYPE 2 DIABETES MELLITUS WITH DIABETIC NEUROPATHY, WITH LONG-TERM CURRENT USE OF INSULIN (HCC): Primary | ICD-10-CM

## 2019-10-10 DIAGNOSIS — Z12.31 ENCOUNTER FOR SCREENING MAMMOGRAM FOR BREAST CANCER: ICD-10-CM

## 2019-10-10 DIAGNOSIS — Z79.4 TYPE 2 DIABETES MELLITUS WITH DIABETIC NEUROPATHY, WITH LONG-TERM CURRENT USE OF INSULIN (HCC): Primary | ICD-10-CM

## 2019-10-10 DIAGNOSIS — Z12.11 SCREEN FOR COLON CANCER: ICD-10-CM

## 2019-10-10 DIAGNOSIS — I25.118 CORONARY ARTERY DISEASE OF NATIVE HEART WITH STABLE ANGINA PECTORIS, UNSPECIFIED VESSEL OR LESION TYPE (HCC): ICD-10-CM

## 2019-10-10 LAB — HBA1C MFR BLD: 14 %

## 2019-10-10 PROCEDURE — 83036 HEMOGLOBIN GLYCOSYLATED A1C: CPT | Performed by: INTERNAL MEDICINE

## 2019-10-10 PROCEDURE — 99214 OFFICE O/P EST MOD 30 MIN: CPT | Performed by: INTERNAL MEDICINE

## 2019-10-10 ASSESSMENT — ENCOUNTER SYMPTOMS
NAUSEA: 0
WHEEZING: 0
TROUBLE SWALLOWING: 0
VOMITING: 0
ABDOMINAL PAIN: 0
SHORTNESS OF BREATH: 0
VOICE CHANGE: 0

## 2019-10-17 ENCOUNTER — TELEPHONE (OUTPATIENT)
Dept: ORTHOPEDIC SURGERY | Age: 59
End: 2019-10-17

## 2020-02-13 RX ORDER — NITROGLYCERIN 0.4 MG/1
TABLET SUBLINGUAL
Qty: 25 TABLET | Refills: 0 | Status: SHIPPED | OUTPATIENT
Start: 2020-02-13 | End: 2020-02-17

## 2020-02-13 NOTE — TELEPHONE ENCOUNTER
[facility] Medication Refills:    Medication: Nitro    Dosage: 0.4    Number: 25    Refills: 0    Last Office Visit: 9/19/19    Next Office Visit:     Last Refill: 4/23/19    Last Labs: 4/23/19

## 2020-02-17 ENCOUNTER — OFFICE VISIT (OUTPATIENT)
Dept: INTERNAL MEDICINE CLINIC | Age: 60
End: 2020-02-17
Payer: COMMERCIAL

## 2020-02-17 ENCOUNTER — NURSE TRIAGE (OUTPATIENT)
Dept: OTHER | Facility: CLINIC | Age: 60
End: 2020-02-17

## 2020-02-17 VITALS
OXYGEN SATURATION: 99 % | WEIGHT: 293 LBS | DIASTOLIC BLOOD PRESSURE: 80 MMHG | SYSTOLIC BLOOD PRESSURE: 128 MMHG | HEART RATE: 74 BPM | BODY MASS INDEX: 47.59 KG/M2 | TEMPERATURE: 96.8 F

## 2020-02-17 PROCEDURE — G8484 FLU IMMUNIZE NO ADMIN: HCPCS | Performed by: INTERNAL MEDICINE

## 2020-02-17 PROCEDURE — 3046F HEMOGLOBIN A1C LEVEL >9.0%: CPT | Performed by: INTERNAL MEDICINE

## 2020-02-17 PROCEDURE — G8427 DOCREV CUR MEDS BY ELIG CLIN: HCPCS | Performed by: INTERNAL MEDICINE

## 2020-02-17 PROCEDURE — 1036F TOBACCO NON-USER: CPT | Performed by: INTERNAL MEDICINE

## 2020-02-17 PROCEDURE — 93000 ELECTROCARDIOGRAM COMPLETE: CPT | Performed by: INTERNAL MEDICINE

## 2020-02-17 PROCEDURE — G8417 CALC BMI ABV UP PARAM F/U: HCPCS | Performed by: INTERNAL MEDICINE

## 2020-02-17 PROCEDURE — 3017F COLORECTAL CA SCREEN DOC REV: CPT | Performed by: INTERNAL MEDICINE

## 2020-02-17 PROCEDURE — 99214 OFFICE O/P EST MOD 30 MIN: CPT | Performed by: INTERNAL MEDICINE

## 2020-02-17 PROCEDURE — 2022F DILAT RTA XM EVC RTNOPTHY: CPT | Performed by: INTERNAL MEDICINE

## 2020-02-17 RX ORDER — MELOXICAM 15 MG/1
15 TABLET ORAL
COMMUNITY
Start: 2019-11-11 | End: 2022-07-26 | Stop reason: ALTCHOICE

## 2020-02-17 RX ORDER — NITROGLYCERIN 0.4 MG/1
TABLET SUBLINGUAL
Qty: 25 TABLET | Refills: 3 | Status: SHIPPED | OUTPATIENT
Start: 2020-02-17 | End: 2020-06-16

## 2020-02-17 RX ORDER — DULAGLUTIDE 1.5 MG/.5ML
1.5 INJECTION, SOLUTION SUBCUTANEOUS
COMMUNITY
Start: 2020-02-02 | End: 2020-02-27

## 2020-02-17 RX ORDER — ATORVASTATIN CALCIUM 40 MG/1
40 TABLET, FILM COATED ORAL DAILY
Qty: 90 TABLET | Refills: 1 | Status: SHIPPED | OUTPATIENT
Start: 2020-02-17 | End: 2021-05-27 | Stop reason: SDUPTHER

## 2020-02-17 NOTE — PROGRESS NOTES
Chief Complaint   Patient presents with    Shortness of Breath     nausea ,sweating        Yan Monroe 62 y.o. female is here for an acute appointment    She complains of 2 episodes of epigastric discomfort associated with nausea and vomiting over the weekend. She has chronic dyspnea on exertion, has a known ejection fraction of approximately 35% according to last cardiology note. She has a 50% LAD lesion    Patient was concerned over these 2 episodes. She did not have hematemesis, melena, hematochezia,    The patient has known diabetes mellitus, hypertension and has been prescribed nitroglycerin in the past.  She did not have nitro to take over the weekend when she had these episodes of nausea vomiting and some epigastric discomfort. She advises me she feels much better this morning. She denies fever, chills, night sweats, cough, sputum production    Review of her record shows that 2 years ago she was admitted with some similar discomfort and had a CT scan of the chest done documenting some pneumonia. The patient currently is not on statins. Apparently they have been  prescribed in the past but she advised me when she found her cholesterol was normal she discontinued them. She denies having any prior problems with statins. Current Outpatient Medications   Medication Sig Dispense Refill    meloxicam (MOBIC) 15 MG tablet Take 15 mg by mouth      TRULICITY 1.5 LC/3.2XG SOPN 1.5 Units      atorvastatin (LIPITOR) 40 MG tablet Take 1 tablet by mouth daily 90 tablet 1    nitroGLYCERIN (NITROSTAT) 0.4 MG SL tablet up to max of 3 total doses.  If no relief after 1 dose, call 911. 25 tablet 3    insulin lispro (HUMALOG KWIKPEN) 200 UNIT/ML SOPN pen Inject 10 Units into the skin 3 times daily (before meals) 2 pen 3    Semaglutide,0.25 or 0.5MG/DOS, (OZEMPIC, 0.25 OR 0.5 MG/DOSE,) 2 MG/1.5ML SOPN Inject 0.25 mg into the skin once a week 1.5 mL 1    amLODIPine (NORVASC) 5 MG tablet Take 1 tablet by mouth daily 30 tablet 5    lisinopril (PRINIVIL;ZESTRIL) 40 MG tablet TAKE 1 TABLET BY MOUTH EVERY DAY 90 tablet 1    albuterol sulfate HFA (PROAIR HFA) 108 (90 Base) MCG/ACT inhaler Inhale 2 puffs into the lungs every 6 hours as needed for Wheezing 1 Inhaler 3    insulin glargine (LANTUS SOLOSTAR) 100 UNIT/ML injection pen INJECT 55 UNITS INTO THE SKIN 2 TIMES DAILY (Patient taking differently: 60 Units 2 times daily INJECT 55 UNITS INTO THE SKIN 2 TIMES DAILY) 30 pen 1    metoprolol tartrate (LOPRESSOR) 50 MG tablet Take 1 tablet by mouth 2 times daily 60 tablet 5    omeprazole (PRILOSEC) 20 MG delayed release capsule Take 1 capsule by mouth 2 times daily (Patient taking differently: Take 20 mg by mouth 2 times daily as needed ) 60 capsule 0    aspirin 81 MG chewable tablet TAKE 1 TABLET BY MOUTH DAILY 30 tablet 5    oxyCODONE-acetaminophen (PERCOCET) 7.5-325 MG per tablet Take 1 tablet by mouth 4 times daily .  Insulin Pen Needle 32G X 4 MM MISC Use to give insulin 4 x daily and with sliding scale 150 each 1    UNABLE TO FIND GLUCOSE TEST STRIPS, TESTING TID, ON INSULIN, E11.9 #300  DAYS  DISPENSE STRIPS BEST COVERED BY INSURANCE 300 each 3    blood glucose test strips (FREESTYLE LITE) strip 1 each by In Vitro route daily As needed. 100 each 5    glucagon 1 MG injection Inject 1 mg into the skin See Admin Instructions Follow package directions for low blood sugar. 1 kit 3    UNABLE TO FIND Glucometer #1, E11.9, testing TID. ON INSULIN. Home pt.   DISPENSE METER BEST COVERED BY INSURANCE 1 each 0    Lancets MISC #300  DAYS, TESTING TID, E11.9 300 each 3    glucose monitoring kit (FREESTYLE) monitoring kit 1 kit by Does not apply route daily 1 kit 0    Lancet Devices (ACCU-CHEK SOFTCLIX LANCET DEV) MISC 1 Units by Does not apply route 4 times daily (before meals and nightly) 100 each 3    SOFT TOUCH LANCETS MISC 1 strip by Does not apply route 4 times daily 100 each 3     No current facility-administered medications for this visit. Past Medical History:   Diagnosis Date    Anxiety     Arthritis     Asthma     as child    Bilateral chronic knee pain     sees pain management    Degenerative disc disease, cervical MRI 2011    Multilevel cervical degenerative this disease most significant at the C7-T1 level where there is probable impingement of the exiting right C8 nerve root caused by disc herniation.     Depression     Diabetes     Yan Brady[facility] at [facility]    High blood pressure     Hypertriglyceridemia     Lumbar herniated disc MRI 2011    L5-S1    Morbid obesity (HCC)     Opiate dependence (HCC)     Osteomyelitis (HCC)     Pneumonia     as child           /80   Pulse 74   Temp 96.8 °F (36 °C)   Wt (!) 313 lb (142 kg)   SpO2 99%   BMI 47.59 kg/m²     General Appearance:   She is morbidly obese   Head:  Normocephalic, without obvious abnormality, atraumatic   Neck: Supple, symmetrical, trachea midline, no adenopathy, thyroid: not enlarged, symmetric, no tenderness/mass/nodules, no carotid bruit or JVD   Lungs:   Clear to auscultation bilaterally, respirations unlabored   Chest Wall:  No tenderness or deformity   Heart:  Regular rate and rhythm, S1, S2 normal, no murmur, rub or gallop   Abdomen:   Soft, non-tender, bowel sounds active all four quadrants,  no masses, no organomegaly   Skin: Skin color, texture, turgor normal, no rashes or lesions   Lymph nodes: Cervical, supraclavicular  Adenopathy is absent   Neurologic: No focal neurological deficits noted       No components found for: CHLPL  Lab Results   Component Value Date    TRIG 94 11/06/2017    TRIG 165 (H) 09/29/2017    TRIG 200 (H) 11/02/2016     Lab Results   Component Value Date    HDL 60 11/06/2017    HDL 61 (H) 09/29/2017    HDL 73 (H) 11/02/2016     Lab Results   Component Value Date    LDLCALC 42 11/06/2017    LDLCALC 85 09/29/2017    LDLCALC 74 11/02/2016     Lab Results   Component Value Date    LABVLDL 19 11/06/2017    LABVLDL 33 09/29/2017    LABVLDL 40 11/02/2016     Lab Results   Component Value Date    CREATININE 0.8 04/23/2019          ASSESSMENT/PLAN[de-identified]  Electrocardiogram was performed showing nonspecific ST and T wave changes. When compared to previous EKGs it does not show evidence of some T wave inversion that had been seen on previous EKGs    There is no ST segment elevation, no Q waves noted    The patient has known coronary artery disease, there is no evidence of a recent cardiac event. I advised her to reduce her chances of future cardiovascular events she would benefit from being on a statin in spite of a normal cholesterol. She did agree to take the medication    I advised her that should she get additional discomfort that she experienced over the weekend she can try nitroglycerin and if unrelieved she would need an emergency department evaluation    We discussed exercise. I advised her that I would limit her exercise to walking and if she wanted to pursue more vigorous forms of exercise she would need repeat cardiac evaluation and might be a candidate for cardiac rehab    She advised me she is out of her short acting insulin. This was prescribed. Nitroglycerin refilled and started on atorvastatin 40 mg orally daily     Diagnosis Orders   1. SOB (shortness of breath)  EKG 12 Lead   2. Chest pain, unspecified type     3.  Type 2 diabetes mellitus with diabetic neuropathy, with long-term current use of insulin ([facility]

## 2020-02-17 NOTE — TELEPHONE ENCOUNTER
Reason for Disposition   MILD difficulty breathing (e.g., minimal/no SOB at rest, SOB with walking, pulse < 100) of new onset or worse than normal    Protocols used: BREATHING DIFFICULTY-ADULT-OH    Patient called pre-service center [facility] to schedule appointment, with red flag complaint, transferred to RN access for triage. Pt c/o SOB, nausea, and sweating that started Saturday. She get's symptoms especially when she's up walking. She states she first noticed symptoms when she was walking at [facility]. Denies any pain. She has no fever, no cough. States symptoms are similar to when she was admitted in 2018 and was found to have a heart blockage. States she has been using her albuterol MDI at home. Triage indicates for pt to be seen in the office now. Pt instructed to call back for any new or worsening symptoms. Patient verbalized understanding. Patient denies any other questions or concerns. Writer provided warm transfer to PCP office for appointment scheduling. Please do not respond to the triage nurse through this encounter. Any subsequent communication should be directly with the patient.

## 2020-03-10 ENCOUNTER — TELEPHONE (OUTPATIENT)
Dept: INTERNAL MEDICINE CLINIC | Age: 60
End: 2020-03-10

## 2020-04-02 ENCOUNTER — TELEPHONE (OUTPATIENT)
Dept: INTERNAL MEDICINE CLINIC | Age: 60
End: 2020-04-02

## 2020-04-06 RX ORDER — AMLODIPINE BESYLATE 5 MG/1
TABLET ORAL
Qty: 90 TABLET | Refills: 1 | Status: SHIPPED | OUTPATIENT
Start: 2020-04-06 | End: 2020-12-30

## 2020-04-09 ENCOUNTER — VIRTUAL VISIT (OUTPATIENT)
Dept: INTERNAL MEDICINE CLINIC | Age: 60
End: 2020-04-09
Payer: COMMERCIAL

## 2020-04-09 PROCEDURE — 3046F HEMOGLOBIN A1C LEVEL >9.0%: CPT | Performed by: INTERNAL MEDICINE

## 2020-04-09 PROCEDURE — 2022F DILAT RTA XM EVC RTNOPTHY: CPT | Performed by: INTERNAL MEDICINE

## 2020-04-09 PROCEDURE — 1036F TOBACCO NON-USER: CPT | Performed by: INTERNAL MEDICINE

## 2020-04-09 PROCEDURE — 3017F COLORECTAL CA SCREEN DOC REV: CPT | Performed by: INTERNAL MEDICINE

## 2020-04-09 PROCEDURE — G8427 DOCREV CUR MEDS BY ELIG CLIN: HCPCS | Performed by: INTERNAL MEDICINE

## 2020-04-09 PROCEDURE — 99214 OFFICE O/P EST MOD 30 MIN: CPT | Performed by: INTERNAL MEDICINE

## 2020-04-09 PROCEDURE — G8417 CALC BMI ABV UP PARAM F/U: HCPCS | Performed by: INTERNAL MEDICINE

## 2020-04-09 RX ORDER — DULOXETIN HYDROCHLORIDE 30 MG/1
30 CAPSULE, DELAYED RELEASE ORAL DAILY
Qty: 30 CAPSULE | Refills: 3 | Status: SHIPPED | OUTPATIENT
Start: 2020-04-09 | End: 2020-12-14

## 2020-04-09 RX ORDER — INSULIN GLARGINE 100 [IU]/ML
60 INJECTION, SOLUTION SUBCUTANEOUS NIGHTLY
Qty: 5 PEN | Refills: 0 | Status: SHIPPED
Start: 2020-04-09 | End: 2020-06-15 | Stop reason: CLARIF

## 2020-04-09 RX ORDER — INSULIN LISPRO 200 [IU]/ML
10 INJECTION, SOLUTION SUBCUTANEOUS
Qty: 5 PEN | Refills: 2 | Status: SHIPPED | OUTPATIENT
Start: 2020-04-09 | End: 2020-10-28

## 2020-04-09 ASSESSMENT — ENCOUNTER SYMPTOMS
VOICE CHANGE: 0
CHEST TIGHTNESS: 0
TROUBLE SWALLOWING: 0
WHEEZING: 0
SHORTNESS OF BREATH: 0

## 2020-04-09 NOTE — PROGRESS NOTES
or respiratory distress        [] Abnormal-      Musculoskeletal:   [x] Normal gait with no signs of ataxia         [] Normal range of motion of neck        [] Abnormal-       Neurological:        [x] No Facial Asymmetry (Cranial nerve 7 motor function) (limited exam to video visit)          [x] No gaze palsy        [] Abnormal-         Skin:        [x] No significant exanthematous lesions or discoloration noted on facial skin         [] Abnormal-            Psychiatric:       [x] Normal Affect [x] No Hallucinations        [] Abnormal-     Other pertinent observable physical exam findings-     ASSESSMENT/PLAN:  1. Type 2 diabetes mellitus with diabetic neuropathy, with long-term current use of insulin ([facility] [address]  Patient is again advised to take Lantus regularly. She have not had hemoglobin A1c done as advised. She will get lab work done in the next few weeks before next visit in 3 months. She is going to schedule his eye visit in the next couple of months  - HEMOGLOBIN A1C; Future  - Lipid, Fasting; Future  - COMPREHENSIVE METABOLIC PANEL; Future  - CBC; Future  - TSH with Reflex; Future  - Dulaglutide 1.5 MG/0.5ML SOPN; INJECT 1.5 MG INTO THE SKIN ONCE A WEEK INDICATIONS: DIABETES  Dispense: 4 pen; Refill: 5  - insulin lispro (HUMALOG KWIKPEN) 200 UNIT/ML SOPN pen; Inject 10 Units into the skin 3 times daily (before meals)  Dispense: 5 pen; Refill: 2  - Insulin Pen Needle 32G X 4 MM MISC; Use to give insulin 4 x daily and with sliding scale  Dispense: 150 each; Refill: 1  - insulin glargine (LANTUS SOLOSTAR) 100 UNIT/ML injection pen; 60 Units nightly  Dispense: 5 pen; Refill: 0    2. Essential hypertension and history of coronary artery disease. Continue aspirin, beta-blocker, continue amlodipine, lisinopril. She is advised to monitor blood pressure occasionally. History of coronary artery disease. She is to make appointment with cardiologist    3.  Moderate episode of recurrent major depressive disorder

## 2020-06-17 RX ORDER — NITROGLYCERIN 0.4 MG/1
TABLET SUBLINGUAL
Qty: 25 TABLET | Refills: 3 | Status: SHIPPED | OUTPATIENT
Start: 2020-06-17 | End: 2022-05-18 | Stop reason: SDUPTHER

## 2020-07-06 RX ORDER — METOPROLOL TARTRATE 50 MG/1
TABLET, FILM COATED ORAL
Qty: 60 TABLET | Refills: 5 | Status: SHIPPED | OUTPATIENT
Start: 2020-07-06 | End: 2021-03-29 | Stop reason: SDUPTHER

## 2020-09-21 RX ORDER — INSULIN GLARGINE 100 [IU]/ML
55 INJECTION, SOLUTION SUBCUTANEOUS NIGHTLY
Qty: 6 PEN | Refills: 0 | Status: SHIPPED | OUTPATIENT
Start: 2020-09-21 | End: 2020-10-28

## 2020-10-19 ENCOUNTER — OFFICE VISIT (OUTPATIENT)
Dept: INTERNAL MEDICINE CLINIC | Age: 60
End: 2020-10-19
Payer: COMMERCIAL

## 2020-10-19 VITALS
DIASTOLIC BLOOD PRESSURE: 92 MMHG | BODY MASS INDEX: 44.41 KG/M2 | TEMPERATURE: 97 F | HEIGHT: 68 IN | WEIGHT: 293 LBS | HEART RATE: 90 BPM | SYSTOLIC BLOOD PRESSURE: 158 MMHG

## 2020-10-19 DIAGNOSIS — I10 ESSENTIAL HYPERTENSION: ICD-10-CM

## 2020-10-19 DIAGNOSIS — Z79.4 TYPE 2 DIABETES MELLITUS WITH DIABETIC NEUROPATHY, WITH LONG-TERM CURRENT USE OF INSULIN (HCC): ICD-10-CM

## 2020-10-19 DIAGNOSIS — E11.40 TYPE 2 DIABETES MELLITUS WITH DIABETIC NEUROPATHY, WITH LONG-TERM CURRENT USE OF INSULIN (HCC): ICD-10-CM

## 2020-10-19 LAB
A/G RATIO: 1.2 (ref 1.1–2.2)
ALBUMIN SERPL-MCNC: 4.1 G/DL (ref 3.4–5)
ALP BLD-CCNC: 122 U/L (ref 40–129)
ALT SERPL-CCNC: 11 U/L (ref 10–40)
ANION GAP SERPL CALCULATED.3IONS-SCNC: 13 MMOL/L (ref 3–16)
AST SERPL-CCNC: 14 U/L (ref 15–37)
BACTERIA: ABNORMAL /HPF
BILIRUB SERPL-MCNC: 0.4 MG/DL (ref 0–1)
BILIRUBIN URINE: NEGATIVE
BLOOD, URINE: NEGATIVE
BUN BLDV-MCNC: 12 MG/DL (ref 7–20)
CALCIUM SERPL-MCNC: 9.1 MG/DL (ref 8.3–10.6)
CHLORIDE BLD-SCNC: 99 MMOL/L (ref 99–110)
CHOLESTEROL, TOTAL: 186 MG/DL (ref 0–199)
CLARITY: ABNORMAL
CO2: 27 MMOL/L (ref 21–32)
COLOR: YELLOW
CREAT SERPL-MCNC: 0.8 MG/DL (ref 0.6–1.2)
CREATININE URINE: 163.3 MG/DL (ref 28–259)
EPITHELIAL CELLS, UA: 8 /HPF (ref 0–5)
GFR AFRICAN AMERICAN: >60
GFR NON-AFRICAN AMERICAN: >60
GLOBULIN: 3.4 G/DL
GLUCOSE BLD-MCNC: 135 MG/DL (ref 70–99)
GLUCOSE URINE: NEGATIVE MG/DL
HCT VFR BLD CALC: 37.4 % (ref 36–48)
HDLC SERPL-MCNC: 86 MG/DL (ref 40–60)
HEMOGLOBIN: 12.8 G/DL (ref 12–16)
HYALINE CASTS: 0 /LPF (ref 0–8)
KETONES, URINE: NEGATIVE MG/DL
LDL CHOLESTEROL CALCULATED: 80 MG/DL
LEUKOCYTE ESTERASE, URINE: NEGATIVE
MCH RBC QN AUTO: 30.1 PG (ref 26–34)
MCHC RBC AUTO-ENTMCNC: 34.2 G/DL (ref 31–36)
MCV RBC AUTO: 87.9 FL (ref 80–100)
MICROALBUMIN UR-MCNC: 4 MG/DL
MICROALBUMIN/CREAT UR-RTO: 24.5 MG/G (ref 0–30)
MICROSCOPIC EXAMINATION: YES
NITRITE, URINE: NEGATIVE
PDW BLD-RTO: 14.2 % (ref 12.4–15.4)
PH UA: 7 (ref 5–8)
PLATELET # BLD: 302 K/UL (ref 135–450)
PMV BLD AUTO: 8.5 FL (ref 5–10.5)
POTASSIUM SERPL-SCNC: 4.2 MMOL/L (ref 3.5–5.1)
PROTEIN UA: ABNORMAL MG/DL
RBC # BLD: 4.25 M/UL (ref 4–5.2)
RBC UA: 3 /HPF (ref 0–4)
SODIUM BLD-SCNC: 139 MMOL/L (ref 136–145)
SPECIFIC GRAVITY UA: 1.02 (ref 1–1.03)
TOTAL PROTEIN: 7.5 G/DL (ref 6.4–8.2)
TRIGL SERPL-MCNC: 99 MG/DL (ref 0–150)
URINE TYPE: ABNORMAL
UROBILINOGEN, URINE: 0.2 E.U./DL
VLDLC SERPL CALC-MCNC: 20 MG/DL
WBC # BLD: 6.5 K/UL (ref 4–11)
WBC UA: 6 /HPF (ref 0–5)

## 2020-10-19 PROCEDURE — G8417 CALC BMI ABV UP PARAM F/U: HCPCS | Performed by: INTERNAL MEDICINE

## 2020-10-19 PROCEDURE — 2022F DILAT RTA XM EVC RTNOPTHY: CPT | Performed by: INTERNAL MEDICINE

## 2020-10-19 PROCEDURE — 3046F HEMOGLOBIN A1C LEVEL >9.0%: CPT | Performed by: INTERNAL MEDICINE

## 2020-10-19 PROCEDURE — G8484 FLU IMMUNIZE NO ADMIN: HCPCS | Performed by: INTERNAL MEDICINE

## 2020-10-19 PROCEDURE — 3017F COLORECTAL CA SCREEN DOC REV: CPT | Performed by: INTERNAL MEDICINE

## 2020-10-19 PROCEDURE — G8427 DOCREV CUR MEDS BY ELIG CLIN: HCPCS | Performed by: INTERNAL MEDICINE

## 2020-10-19 PROCEDURE — 1036F TOBACCO NON-USER: CPT | Performed by: INTERNAL MEDICINE

## 2020-10-19 PROCEDURE — 99214 OFFICE O/P EST MOD 30 MIN: CPT | Performed by: INTERNAL MEDICINE

## 2020-10-19 RX ORDER — BUSPIRONE HYDROCHLORIDE 7.5 MG/1
7.5 TABLET ORAL NIGHTLY
Qty: 60 TABLET | Refills: 1 | Status: SHIPPED | OUTPATIENT
Start: 2020-10-19 | End: 2020-11-18

## 2020-10-19 SDOH — ECONOMIC STABILITY: FOOD INSECURITY: WITHIN THE PAST 12 MONTHS, THE FOOD YOU BOUGHT JUST DIDN'T LAST AND YOU DIDN'T HAVE MONEY TO GET MORE.: NEVER TRUE

## 2020-10-19 SDOH — ECONOMIC STABILITY: TRANSPORTATION INSECURITY
IN THE PAST 12 MONTHS, HAS THE LACK OF TRANSPORTATION KEPT YOU FROM MEDICAL APPOINTMENTS OR FROM GETTING MEDICATIONS?: NO

## 2020-10-19 SDOH — ECONOMIC STABILITY: INCOME INSECURITY: HOW HARD IS IT FOR YOU TO PAY FOR THE VERY BASICS LIKE FOOD, HOUSING, MEDICAL CARE, AND HEATING?: NOT HARD AT ALL

## 2020-10-19 SDOH — ECONOMIC STABILITY: FOOD INSECURITY: WITHIN THE PAST 12 MONTHS, YOU WORRIED THAT YOUR FOOD WOULD RUN OUT BEFORE YOU GOT MONEY TO BUY MORE.: NEVER TRUE

## 2020-10-19 SDOH — ECONOMIC STABILITY: TRANSPORTATION INSECURITY
IN THE PAST 12 MONTHS, HAS LACK OF TRANSPORTATION KEPT YOU FROM MEETINGS, WORK, OR FROM GETTING THINGS NEEDED FOR DAILY LIVING?: NO

## 2020-10-19 NOTE — PROGRESS NOTES
Geena Jolly  1960  female  56 y.o. SUBJECTIVE:       Chief Complaint   Patient presents with    Diabetes     states will do fasting bw today, due for Foot exam and eye exam    Hypertension     has been taking metoprolol qd.  Other     due for FIT, denies wanting flu vaccine       HPI:  Follow-up visit for chronic problem. She is not taking blood pressure medicine as supposed to. Take blood sugar only once a day. She feels her blood sugar has been excellent. Yesterday fasting blood glucose is 104. In the past she never pursued to see endocrinologist.    She have not had any of the lab work done. Patient does not monitor blood pressure at home. About 2 weeks ago she had episode of chest pain and dizziness. She have not followed up with her cardiologist for quite some times. H/O of coronary artery disease. Patient refusing colonoscopy, other preventive vaccinations. She has been following up with pain management specialist.    Patient is complaining of insomnia. She denies daytime fatigue tiredness. She have difficulty in initiation of sleep. Past Medical History:   Diagnosis Date    Anxiety     Arthritis     Asthma     as child    Bilateral chronic knee pain     sees pain management    Degenerative disc disease, cervical MRI 2011    Multilevel cervical degenerative this disease most significant at the C7-T1 level where there is probable impingement of the exiting right C8 nerve root caused by disc herniation.     Depression     Diabetes     Franck Arshad, [facility] at [facility]    High blood pressure     Hypertriglyceridemia     Lumbar herniated disc MRI 2011    L5-S1    Morbid obesity ([address]     Opiate dependence ([address]     Osteomyelitis ([address]     Pneumonia     as child     Past Surgical History:   Procedure Laterality Date    CARDIAC CATHETERIZATION  11/2017    1.  Diffusely diseased distal LAD.  Distal LAD has a 50% narrowing in two ---     Social History     Socioeconomic History    Marital status:      Spouse name: None    Number of children: 3    Years of education: None    Highest education level: None   Occupational History    Occupation: /Masters     Comment: [employer] [place]   Social Needs    Financial resource strain: Not hard at all   [employer]     Worry: Never true     Inability: Never true    Transportation needs     Medical: No     Non-medical: No   Tobacco Use    Smoking status: Never Smoker    Smokeless tobacco: Never Used   Substance and Sexual Activity    Alcohol use: No     Alcohol/week: 0.0 standard drinks    Drug use: No    Sexual activity: None   Lifestyle    Physical activity     Days per week: None     Minutes per session: None    Stress: None   Relationships    Social connections     Talks on phone: None     Gets together: None     Attends Restorationist service: None     Active member of club or organization: None     Attends meetings of clubs or organizations: None     Relationship status: None    Intimate partner violence     Fear of current or ex partner: None     Emotionally abused: None     Physically abused: None     Forced sexual activity: None   Other Topics Concern    None   Social History Narrative    None     Family History   Problem Relation Age of Onset    Lung Cancer Mother 48        + tob    Osteoarthritis Other     Cancer Other     Diabetes Other     Hypertension Other     Stroke Other     Heart Disease Other     Diabetes Maternal Grandmother        Review of Systems    OBJECTIVE:  Pulse Readings from Last 4 Encounters:   10/19/20 90   02/17/20 74   10/10/19 90   09/19/19 80     Wt Readings from Last 4 Encounters:   10/19/20 (!) 313 lb (142 kg)   02/17/20 (!) 313 lb (142 kg)   10/10/19 299 lb (135.6 kg)   09/19/19 (!) 304 lb (137.9 kg)     BP Readings from Last 4 Encounters:   10/19/20 (!) 158/92   02/17/20 128/80   10/10/19 (!) 154/98   09/19/19 138/88     Physical Exam  Vitals signs and .1 04/23/2019     MICRO/ALB:   Lab Results   Component Value Date    LABMICR 8.20 04/23/2019    LABMICR Not Indicated 08/20/2018    LABCREA 478.2 04/23/2019    MALBCR 17.1 04/23/2019     LIPID:  Lab Results   Component Value Date    CHOL 121 11/06/2017    TRIG 94 11/06/2017    HDL 60 11/06/2017    LDLCALC 42 11/06/2017    LABVLDL 19 11/06/2017     TSH:   Lab Results   Component Value Date    TSHREFLEX 0.89 01/23/2017         ASSESSMENT/PLAN:    Zee Gordon was seen today for diabetes, hypertension and other. Diagnoses and all orders for this visit:    Type 2 diabetes mellitus with diabetic neuropathy, with long-term current use of insulin ([facility]  Chronically uncontrolled. She is not taking regular insulin. She does not like to check blood sugar 3 times daily. Multiple referral and advised to establish with endocrinologist but failed. -      DIABETES FOOT EXAM  -     Comprehensive Metabolic Panel; Future  -     Lipid Panel; Future  -     Hemoglobin A1C; Future  -     MICROALBUMIN / CREATININE URINE RATIO; Future    Encounter for screening mammogram for breast cancer  -     [facility] Digital Screen Bilateral [[id number]]; Future    Screening for colon cancer  -     POCT FECAL IMMUNOCHEMICAL TEST (FIT); Future    Essential hypertension  Not well controlled. Medication compliance issue. She will increase metoprolol twice daily.  -     CBC; Future  -     Comprehensive Metabolic Panel; Future  -     Urinalysis; Future    Coronary artery disease of native heart with stable angina pectoris, unspecified vessel or lesion type ([facility]  History of coronary artery disease. Continue metoprolol, aspirin, atorvastatin, as needed nitroglycerin. She will make follow-up appointment with her cardiologist.  Primary insomnia trial of  -     busPIRone (BUSPAR) 7.5 MG tablet;  Take 1 tablet by mouth nightly            Orders Placed This Encounter   Procedures    [facility] Digital Screen Bilateral [[id number]]     Standing Status:   Future Standing Expiration Date:   10/19/2021    CBC     Standing Status:   Future     Standing Expiration Date:   10/19/2021    Comprehensive Metabolic Panel     Standing Status:   Future     Standing Expiration Date:   10/19/2021    Lipid Panel     Standing Status:   Future     Standing Expiration Date:   10/19/2021     Order Specific Question:   Is Patient Fasting?/# of Hours     Answer:   10    Hemoglobin A1C     Standing Status:   Future     Standing Expiration Date:   10/19/2021    Urinalysis     Standing Status:   Future     Standing Expiration Date:   10/19/2021    MICROALBUMIN / CREATININE URINE RATIO     Standing Status:   Future     Standing Expiration Date:   10/19/2021    POCT FECAL IMMUNOCHEMICAL TEST (FIT)     Standing Status:   Future     Standing Expiration Date:   10/19/2021    HM DIABETES FOOT EXAM     Current Outpatient Medications   Medication Sig Dispense Refill    busPIRone (BUSPAR) 7.5 MG tablet Take 1 tablet by mouth nightly 60 tablet 1    insulin glargine (BASAGLAR KWIKPEN) 100 UNIT/ML injection pen Inject 55 Units into the skin nightly 6 pen 0    metoprolol tartrate (LOPRESSOR) 50 MG tablet TAKE 1 TABLET BY MOUTH TWICE A DAY 60 tablet 5    lisinopril (PRINIVIL;ZESTRIL) 40 MG tablet TAKE 1 TABLET BY MOUTH EVERY DAY 90 tablet 0    Dulaglutide 1.5 MG/0.5ML SOPN INJECT 1.5 MG INTO THE SKIN ONCE A WEEK INDICATIONS: DIABETES 4 pen 5    insulin lispro (HUMALOG KWIKPEN) 200 UNIT/ML SOPN pen Inject 10 Units into the skin 3 times daily (before meals) 5 pen 2    DULoxetine (CYMBALTA) 30 MG extended release capsule Take 1 capsule by mouth daily 30 capsule 3    amLODIPine (NORVASC) 5 MG tablet TAKE 1 TABLET BY MOUTH EVERY DAY 90 tablet 1    meloxicam (MOBIC) 15 MG tablet Take 15 mg by mouth      atorvastatin (LIPITOR) 40 MG tablet Take 1 tablet by mouth daily 90 tablet 1    albuterol sulfate HFA (PROAIR HFA) 108 (90 Base) MCG/ACT inhaler Inhale 2 puffs into the lungs every 6 hours as needed for Wheezing 1 Inhaler 3    omeprazole (PRILOSEC) 20 MG delayed release capsule Take 1 capsule by mouth 2 times daily (Patient taking differently: Take 20 mg by mouth 2 times daily as needed ) 60 capsule 0    aspirin 81 MG chewable tablet TAKE 1 TABLET BY MOUTH DAILY 30 tablet 5    oxyCODONE-acetaminophen (PERCOCET) 7.5-325 MG per tablet Take 1 tablet by mouth 4 times daily .  Insulin Pen Needle 32G X 4 MM MISC Use to give insulin 4 x daily and with sliding scale 120 each 0    nitroGLYCERIN (NITROSTAT) 0.4 MG SL tablet PLACE 1 TABLET UNDER TONGUE EVERY 5 MINUTES AS NEEDED FOR CHEST PAIN. MAX OF 3 DOSES 25 tablet 3    UNABLE TO FIND GLUCOSE TEST STRIPS, TESTING TID, ON INSULIN, E11.9 #300  DAYS  DISPENSE STRIPS BEST COVERED BY INSURANCE 300 each 3    blood glucose test strips (FREESTYLE LITE) strip 1 each by In Vitro route daily As needed. 100 each 5    glucagon 1 MG injection Inject 1 mg into the skin See Admin Instructions Follow package directions for low blood sugar. 1 kit 3    UNABLE TO FIND Glucometer #1, E11.9, testing TID. ON INSULIN. Home pt. DISPENSE METER BEST COVERED BY INSURANCE 1 each 0    Lancets MISC #300  DAYS, TESTING TID, E11.9 300 each 3    glucose monitoring kit (FREESTYLE) monitoring kit 1 kit by Does not apply route daily 1 kit 0    Lancet Devices (ACCU-CHEK SOFTCLIX LANCET DEV) MISC 1 Units by Does not apply route 4 times daily (before meals and nightly) 100 each 3    SOFT TOUCH LANCETS MISC 1 strip by Does not apply route 4 times daily 100 each 3     No current facility-administered medications for this visit. Return in about 4 weeks (around 11/16/2020) for HTN, insomnia, FIT. An After Visit Summary was printed and given to the patient. Documentation was done using voice recognition dragon software. Every effort was made to ensure accuracy; however, inadvertent  Unintentional computerized transcription errors may be present.

## 2020-10-20 LAB
ESTIMATED AVERAGE GLUCOSE: 151.3 MG/DL
HBA1C MFR BLD: 6.9 %

## 2020-10-23 RX ORDER — DULAGLUTIDE 1.5 MG/.5ML
INJECTION, SOLUTION SUBCUTANEOUS
Qty: 2 PEN | Refills: 5 | Status: SHIPPED | OUTPATIENT
Start: 2020-10-23 | End: 2020-11-16

## 2020-10-28 RX ORDER — INSULIN LISPRO 200 [IU]/ML
10 INJECTION, SOLUTION SUBCUTANEOUS 3 TIMES DAILY PRN
Qty: 5 PEN | Refills: 2
Start: 2020-10-28 | End: 2021-03-26 | Stop reason: SDUPTHER

## 2020-10-28 RX ORDER — INSULIN GLARGINE 100 [IU]/ML
55 INJECTION, SOLUTION SUBCUTANEOUS NIGHTLY
Qty: 6 PEN | Refills: 0
Start: 2020-10-28 | End: 2020-11-23

## 2020-11-16 ENCOUNTER — OFFICE VISIT (OUTPATIENT)
Dept: INTERNAL MEDICINE CLINIC | Age: 60
End: 2020-11-16
Payer: COMMERCIAL

## 2020-11-16 VITALS
HEIGHT: 68 IN | BODY MASS INDEX: 44.41 KG/M2 | WEIGHT: 293 LBS | SYSTOLIC BLOOD PRESSURE: 150 MMHG | HEART RATE: 66 BPM | TEMPERATURE: 96.4 F | DIASTOLIC BLOOD PRESSURE: 86 MMHG

## 2020-11-16 PROCEDURE — 2022F DILAT RTA XM EVC RTNOPTHY: CPT | Performed by: INTERNAL MEDICINE

## 2020-11-16 PROCEDURE — 1036F TOBACCO NON-USER: CPT | Performed by: INTERNAL MEDICINE

## 2020-11-16 PROCEDURE — 99214 OFFICE O/P EST MOD 30 MIN: CPT | Performed by: INTERNAL MEDICINE

## 2020-11-16 PROCEDURE — G8484 FLU IMMUNIZE NO ADMIN: HCPCS | Performed by: INTERNAL MEDICINE

## 2020-11-16 PROCEDURE — 3017F COLORECTAL CA SCREEN DOC REV: CPT | Performed by: INTERNAL MEDICINE

## 2020-11-16 PROCEDURE — G8417 CALC BMI ABV UP PARAM F/U: HCPCS | Performed by: INTERNAL MEDICINE

## 2020-11-16 PROCEDURE — 3044F HG A1C LEVEL LT 7.0%: CPT | Performed by: INTERNAL MEDICINE

## 2020-11-16 PROCEDURE — G8427 DOCREV CUR MEDS BY ELIG CLIN: HCPCS | Performed by: INTERNAL MEDICINE

## 2020-11-16 RX ORDER — LISINOPRIL AND HYDROCHLOROTHIAZIDE 20; 12.5 MG/1; MG/1
1 TABLET ORAL DAILY
Qty: 90 TABLET | Refills: 1 | Status: SHIPPED | OUTPATIENT
Start: 2020-11-16 | End: 2021-06-22

## 2020-11-16 RX ORDER — BLOOD PRESSURE TEST KIT
KIT MISCELLANEOUS
Qty: 1 KIT | Refills: 0 | Status: SHIPPED | OUTPATIENT
Start: 2020-11-16

## 2020-11-16 RX ORDER — LISINOPRIL 40 MG/1
TABLET ORAL
Qty: 90 TABLET | Refills: 1 | Status: CANCELLED | OUTPATIENT
Start: 2020-11-16

## 2020-11-16 ASSESSMENT — PATIENT HEALTH QUESTIONNAIRE - PHQ9
SUM OF ALL RESPONSES TO PHQ9 QUESTIONS 1 & 2: 0
SUM OF ALL RESPONSES TO PHQ QUESTIONS 1-9: 0
1. LITTLE INTEREST OR PLEASURE IN DOING THINGS: 0
SUM OF ALL RESPONSES TO PHQ QUESTIONS 1-9: 0
2. FEELING DOWN, DEPRESSED OR HOPELESS: 0
SUM OF ALL RESPONSES TO PHQ QUESTIONS 1-9: 0

## 2020-11-16 ASSESSMENT — ENCOUNTER SYMPTOMS
VOICE CHANGE: 0
VOMITING: 0
SHORTNESS OF BREATH: 0
TROUBLE SWALLOWING: 0
ABDOMINAL PAIN: 0
WHEEZING: 0
NAUSEA: 0

## 2020-11-16 NOTE — PROGRESS NOTES
Lesly Chow  1960  female  56 y.o. SUBJECTIVE:       Chief Complaint   Patient presents with    Diabetes     off trulicity due to cost--increased copay    Other     reminded to bring back FIT and to schedule mammo. denies wanting flu vaccine       HPI:  Follow-up visit for hypertension. Patient increase metoprolol twice daily. She is not monitoring blood pressure at home. She denies orthostatic symptoms. She have been made appointment with cardiologist as advised last visit because of history of intermittent chest pain. She denies any active chest pain. She cannot recall having any side effect or adverse effect with hydrochlorothiazide. Patient did not start BuSpar for insomnia yet. Her blood sugar is now running slightly high around 140s after stopping Trulicity because of insurance coverage is not cost.  She is requesting alternate medicine. Patient did not reschedule mammogram.  Did not bring fit test card  given last visit    Past Medical History:   Diagnosis Date    Anxiety     Arthritis     Asthma     as child    Bilateral chronic knee pain     sees pain management    Degenerative disc disease, cervical MRI 2011    Multilevel cervical degenerative this disease most significant at the C7-T1 level where there is probable impingement of the exiting right C8 nerve root caused by disc herniation.     Depression     Diabetes     Liz Denice, [facility] at [facility]    High blood pressure     Hypertriglyceridemia     Lumbar herniated disc MRI 2011    L5-S1    Morbid obesity (HCC)     Opiate dependence ([address]     Osteomyelitis (HCC)     Pneumonia     as child     Past Surgical History:   Procedure Laterality Date    CARDIAC CATHETERIZATION  11/2017    1.  Diffusely diseased distal LAD.  Distal LAD has a 50% narrowing in two ---     Social History     Socioeconomic History    Marital status:      Spouse name: None    Number of children: 4    Years of education: None    Highest education level: None   Occupational History    Occupation: /Masters     Comment: [employer]   Social Needs    Financial resource strain: Not hard at all   [place]     Worry: Never true     Inability: Never true   [employer] needs     Medical: No     Non-medical: No   Tobacco Use    Smoking status: Never Smoker    Smokeless tobacco: Never Used   Substance and Sexual Activity    Alcohol use: No     Alcohol/week: 0.0 standard drinks    Drug use: No    Sexual activity: None   Lifestyle    Physical activity     Days per week: None     Minutes per session: None    Stress: None   Relationships    Social connections     Talks on phone: None     Gets together: None     Attends Spiritism service: None     Active member of club or organization: None     Attends meetings of clubs or organizations: None     Relationship status: None    Intimate partner violence     Fear of current or ex partner: None     Emotionally abused: None     Physically abused: None     Forced sexual activity: None   Other Topics Concern    None   Social History Narrative    None     Family History   Problem Relation Age of Onset    Lung Cancer Mother 48        + tob    Osteoarthritis Other     Cancer Other     Diabetes Other     Hypertension Other     Stroke Other     Heart Disease Other     Diabetes Maternal Grandmother        Review of Systems   Constitutional: Negative for diaphoresis and unexpected weight change. HENT: Negative for trouble swallowing and voice change. Respiratory: Negative for shortness of breath and wheezing. Cardiovascular: Negative for chest pain and palpitations. Gastrointestinal: Negative for abdominal pain, nausea and vomiting. Neurological: Negative for dizziness and light-headedness.        OBJECTIVE:  Pulse Readings from Last 4 Encounters:   11/16/20 66   10/19/20 90   02/17/20 74   10/10/19 90     Wt Readings from Last 4 Encounters: 11/16/20 (!) 321 lb (145.6 kg)   10/19/20 (!) 313 lb (142 kg)   02/17/20 (!) 313 lb (142 kg)   10/10/19 299 lb (135.6 kg)     BP Readings from Last 4 Encounters:   11/16/20 (!) 150/86   10/19/20 (!) 158/92   02/17/20 128/80   10/10/19 (!) 154/98     Physical Exam  Vitals signs and nursing note reviewed. Constitutional:       Appearance: She is obese. Eyes:      Conjunctiva/sclera: Conjunctivae normal.   Cardiovascular:      Rate and Rhythm: Normal rate and regular rhythm. Pulses: Normal pulses. Heart sounds: Normal heart sounds. Pulmonary:      Effort: Pulmonary effort is normal.      Breath sounds: Normal breath sounds. Skin:     Capillary Refill: Capillary refill takes less than 2 seconds. Neurological:      Mental Status: She is alert.          CBC:   Lab Results   Component Value Date    WBC 6.5 10/19/2020    HGB 12.8 10/19/2020    HCT 37.4 10/19/2020     10/19/2020     CMP:  Lab Results   Component Value Date     10/19/2020    K 4.2 10/19/2020    K 4.2 10/07/2018    CL 99 10/19/2020    CO2 27 10/19/2020    ANIONGAP 13 10/19/2020    GLUCOSE 135 10/19/2020    BUN 12 10/19/2020    CREATININE 0.8 10/19/2020    GFRAA >60 10/19/2020    CALCIUM 9.1 10/19/2020    PROT 7.5 10/19/2020    LABALBU 4.1 10/19/2020    AGRATIO 1.2 10/19/2020    BILITOT 0.4 10/19/2020    ALKPHOS 122 10/19/2020    ALT 11 10/19/2020    AST 14 10/19/2020    GLOB 3.4 10/19/2020     URINALYSIS:  Lab Results   Component Value Date    GLUCOSEU Negative 10/19/2020    KETUA Negative 10/19/2020    SPECGRAV 1.021 10/19/2020    BLOODU Negative 10/19/2020    PHUR 7.0 10/19/2020    PROTEINU TRACE 10/19/2020    NITRU Negative 10/19/2020    LEUKOCYTESUR Negative 10/19/2020    LABMICR YES 10/19/2020    LABMICR 4.00 10/19/2020    URINETYPE [place] 10/19/2020     HBA1C:   Lab Results   Component Value Date    LABA1C 6.9 10/19/2020    .3 10/19/2020     MICRO/ALB:   Lab Results   Component Value Date    LABMICR YES 10/19/2020    LABMICR 4.00 10/19/2020    LABCREA 163.3 10/19/2020    MALBCR 24.5 10/19/2020     LIPID:  Lab Results   Component Value Date    CHOL 186 10/19/2020    TRIG 99 10/19/2020    HDL 86 10/19/2020    LDLCALC 80 10/19/2020    LABVLDL 20 10/19/2020     TSH:   Lab Results   Component Value Date    TSHREFLEX 0.89 01/23/2017         ASSESSMENT/PLAN:    Sara Madden was seen today for diabetes and other. Diagnoses and all orders for this visit:    Essential hypertension  We will add-     lisinopril-hydroCHLOROthiazide (PRINZIDE;ZESTORETIC) 20-12.5 MG per tablet; Take 1 tablet by mouth daily  Discontinue regular lisinopril-     Blood Pressure KIT; Use as directed  She will continue other antihypertensives including metoprolol and amlodipine. She is advised to monitor blood pressure at home. Type 2 diabetes mellitus with diabetic neuropathy, with long-term current use of insulin (HCC)  We will start-     Semaglutide,0.25 or 0.5MG/DOS, 2 MG/1.5ML SOPN; Inject 0.25 mg into the skin once a week    Continue current insulin regimen. History of insomnia. Patient advised she should consider starting BuSpar. No orders of the defined types were placed in this encounter.     Current Outpatient Medications   Medication Sig Dispense Refill    lisinopril-hydroCHLOROthiazide (PRINZIDE;ZESTORETIC) 20-12.5 MG per tablet Take 1 tablet by mouth daily 90 tablet 1    Semaglutide,0.25 or 0.5MG/DOS, 2 MG/1.5ML SOPN Inject 0.25 mg into the skin once a week 4 pen 0    Blood Pressure KIT Use as directed 1 kit 0    insulin glargine (BASAGLAR KWIKPEN) 100 UNIT/ML injection pen Inject 55 Units into the skin nightly 1-2 x per day based on meals/intake 6 pen 0    insulin lispro (HUMALOG KWIKPEN) 200 UNIT/ML SOPN pen Inject 10 Units into the skin 3 times daily as needed for High Blood Sugar 5 pen 2    busPIRone (BUSPAR) 7.5 MG tablet Take 1 tablet by mouth nightly 60 tablet 1    metoprolol tartrate (LOPRESSOR) 50 MG tablet TAKE 1 MISC 1 strip by Does not apply route 4 times daily 100 each 3     No current facility-administered medications for this visit. Return in about 6 weeks (around 12/28/2020). HTN  An After Visit Summary was printed and given to the patient. Documentation was done using voice recognition dragon software. Every effort was made to ensure accuracy; however, inadvertent  Unintentional computerized transcription errors may be present.

## 2020-11-23 RX ORDER — INSULIN GLARGINE 100 [IU]/ML
INJECTION, SOLUTION SUBCUTANEOUS
Qty: 5 PEN | Refills: 0 | Status: SHIPPED | OUTPATIENT
Start: 2020-11-23 | End: 2020-12-07 | Stop reason: SDUPTHER

## 2020-12-30 ENCOUNTER — OFFICE VISIT (OUTPATIENT)
Dept: INTERNAL MEDICINE CLINIC | Age: 60
End: 2020-12-30
Payer: COMMERCIAL

## 2020-12-30 VITALS
HEART RATE: 90 BPM | SYSTOLIC BLOOD PRESSURE: 138 MMHG | BODY MASS INDEX: 44.41 KG/M2 | HEIGHT: 68 IN | WEIGHT: 293 LBS | TEMPERATURE: 97.4 F | DIASTOLIC BLOOD PRESSURE: 84 MMHG

## 2020-12-30 PROCEDURE — G8484 FLU IMMUNIZE NO ADMIN: HCPCS | Performed by: INTERNAL MEDICINE

## 2020-12-30 PROCEDURE — G8427 DOCREV CUR MEDS BY ELIG CLIN: HCPCS | Performed by: INTERNAL MEDICINE

## 2020-12-30 PROCEDURE — 3017F COLORECTAL CA SCREEN DOC REV: CPT | Performed by: INTERNAL MEDICINE

## 2020-12-30 PROCEDURE — G8417 CALC BMI ABV UP PARAM F/U: HCPCS | Performed by: INTERNAL MEDICINE

## 2020-12-30 PROCEDURE — 2022F DILAT RTA XM EVC RTNOPTHY: CPT | Performed by: INTERNAL MEDICINE

## 2020-12-30 PROCEDURE — 99214 OFFICE O/P EST MOD 30 MIN: CPT | Performed by: INTERNAL MEDICINE

## 2020-12-30 PROCEDURE — 1036F TOBACCO NON-USER: CPT | Performed by: INTERNAL MEDICINE

## 2020-12-30 PROCEDURE — 3044F HG A1C LEVEL LT 7.0%: CPT | Performed by: INTERNAL MEDICINE

## 2020-12-30 RX ORDER — AMLODIPINE BESYLATE 10 MG/1
10 TABLET ORAL DAILY
Qty: 90 TABLET | Refills: 3 | Status: SHIPPED | OUTPATIENT
Start: 2020-12-30 | End: 2021-09-29 | Stop reason: SDUPTHER

## 2020-12-30 RX ORDER — DULOXETIN HYDROCHLORIDE 60 MG/1
60 CAPSULE, DELAYED RELEASE ORAL DAILY
Qty: 90 CAPSULE | Refills: 1 | Status: SHIPPED | OUTPATIENT
Start: 2020-12-30 | End: 2021-09-29 | Stop reason: SDUPTHER

## 2020-12-30 RX ORDER — BLOOD-GLUCOSE METER
1 KIT MISCELLANEOUS 3 TIMES DAILY
Qty: 100 EACH | Refills: 5 | Status: SHIPPED | OUTPATIENT
Start: 2020-12-30 | End: 2022-02-16 | Stop reason: SDUPTHER

## 2020-12-30 RX ORDER — INSULIN GLARGINE 100 [IU]/ML
INJECTION, SOLUTION SUBCUTANEOUS
Qty: 5 PEN | Refills: 3 | Status: SHIPPED | OUTPATIENT
Start: 2020-12-30 | End: 2020-12-30 | Stop reason: SDUPTHER

## 2020-12-30 ASSESSMENT — PATIENT HEALTH QUESTIONNAIRE - PHQ9
1. LITTLE INTEREST OR PLEASURE IN DOING THINGS: 1
SUM OF ALL RESPONSES TO PHQ QUESTIONS 1-9: 2
SUM OF ALL RESPONSES TO PHQ QUESTIONS 1-9: 2
SUM OF ALL RESPONSES TO PHQ9 QUESTIONS 1 & 2: 2
2. FEELING DOWN, DEPRESSED OR HOPELESS: 1
SUM OF ALL RESPONSES TO PHQ QUESTIONS 1-9: 2

## 2020-12-30 ASSESSMENT — ENCOUNTER SYMPTOMS
WHEEZING: 0
VOMITING: 0
ABDOMINAL PAIN: 0
VOICE CHANGE: 0
CHEST TIGHTNESS: 0
NAUSEA: 0
COUGH: 0

## 2020-12-30 NOTE — PROGRESS NOTES
Сергей Cosme  1960  female  56 y.o. SUBJECTIVE:       Chief Complaint   Patient presents with    Hypertension    Diabetes     went on lantus 50 bid on own--unable to afford trulicity    Depression     askingg for increase cymbalta    Weight Gain     4#       HPI:  Follow-up visit. Patient have increased amlodipine 5 mg twice daily. She is also taking other blood pressure medication. She has not been monitoring her blood pressure at home. History of coronary artery disease. Patient continues to have exertional dyspnea. She is again advised to schedule appointment with her cardiologist.  At present denies chest pain, shortness of breath, leg swelling    History of diabetes mellitus. Apparently her insurance coverage for Trulicity was more than $200. Patient cannot afford. She did not get semaglutide as prescribed. She is concerned about the cost.  Since stopping Trulicity her blood sugar now running above 200. She is taking higher dose of Lantus currently taking 50 units twice daily. History of depression. Patient continues to suffer from depression symptoms. She is requesting higher dose of duloxetine. She denies any suicidal homicidal ideation. Patient denies any manic symptoms. Past Medical History:   Diagnosis Date    Anxiety     Arthritis     Asthma     as child    Bilateral chronic knee pain     sees pain management    Degenerative disc disease, cervical MRI 2011    Multilevel cervical degenerative this disease most significant at the C7-T1 level where there is probable impingement of the exiting right C8 nerve root caused by disc herniation.     Depression     Diabetes     Beto Marcum, NP at [facility] High blood pressure     Hypertriglyceridemia     Lumbar herniated disc MRI 2011    L5-S1    Morbid obesity (HCC)     Opiate dependence ([facility] [address]     Osteomyelitis (HCC)     Pneumonia     as child     Past Surgical History:   Procedure Laterality Date    CARDIAC abdominal pain, nausea and vomiting. Psychiatric/Behavioral: Negative for behavioral problems and dysphoric mood. OBJECTIVE:  Pulse Readings from Last 4 Encounters:   12/30/20 90   11/16/20 66   10/19/20 90   02/17/20 74     Wt Readings from Last 4 Encounters:   12/30/20 (!) 325 lb (147.4 kg)   11/16/20 (!) 321 lb (145.6 kg)   10/19/20 (!) 313 lb (142 kg)   02/17/20 (!) 313 lb (142 kg)     BP Readings from Last 4 Encounters:   12/30/20 138/84   11/16/20 (!) 150/86   10/19/20 (!) 158/92   02/17/20 128/80     Physical Exam  Vitals signs reviewed. Constitutional:       Appearance: She is obese. Eyes:      General: No scleral icterus. Conjunctiva/sclera: Conjunctivae normal.   Cardiovascular:      Rate and Rhythm: Normal rate and regular rhythm. Pulses: Normal pulses. Heart sounds: Normal heart sounds. No murmur. Pulmonary:      Effort: Pulmonary effort is normal.      Breath sounds: Normal breath sounds. Abdominal:      General: Bowel sounds are normal.   Musculoskeletal:      Right lower leg: No edema. Left lower leg: No edema. Skin:     Capillary Refill: Capillary refill takes less than 2 seconds. Neurological:      General: No focal deficit present. Mental Status: She is alert and oriented to person, place, and time.          CBC:   Lab Results   Component Value Date    WBC 6.5 10/19/2020    HGB 12.8 10/19/2020    HCT 37.4 10/19/2020     10/19/2020     CMP:  Lab Results   Component Value Date     10/19/2020    K 4.2 10/19/2020    K 4.2 10/07/2018    CL 99 10/19/2020    CO2 27 10/19/2020    ANIONGAP 13 10/19/2020    GLUCOSE 135 10/19/2020    BUN 12 10/19/2020    CREATININE 0.8 10/19/2020    GFRAA >60 10/19/2020    CALCIUM 9.1 10/19/2020    PROT 7.5 10/19/2020    LABALBU 4.1 10/19/2020    AGRATIO 1.2 10/19/2020    BILITOT 0.4 10/19/2020    ALKPHOS 122 10/19/2020    ALT 11 10/19/2020    AST 14 10/19/2020    GLOB 3.4 10/19/2020     URINALYSIS:  Lab Results follow-up appointment. No orders of the defined types were placed in this encounter. Current Outpatient Medications   Medication Sig Dispense Refill    Semaglutide,0.25 or 0.5MG/DOS, 2 MG/1.5ML SOPN Inject 0.25 mg into the skin once a week 4 pen 0    amLODIPine (NORVASC) 10 MG tablet Take 1 tablet by mouth daily 90 tablet 3    DULoxetine (CYMBALTA) 60 MG extended release capsule Take 1 capsule by mouth daily 90 capsule 1    blood glucose test strips (FREESTYLE LITE) strip 1 each by In Vitro route 3 times daily 100 each 5    lisinopril-hydroCHLOROthiazide (PRINZIDE;ZESTORETIC) 20-12.5 MG per tablet Take 1 tablet by mouth daily 90 tablet 1    metoprolol tartrate (LOPRESSOR) 50 MG tablet TAKE 1 TABLET BY MOUTH TWICE A DAY 60 tablet 5    meloxicam (MOBIC) 15 MG tablet Take 15 mg by mouth      atorvastatin (LIPITOR) 40 MG tablet Take 1 tablet by mouth daily 90 tablet 1    albuterol sulfate HFA (PROAIR HFA) 108 (90 Base) MCG/ACT inhaler Inhale 2 puffs into the lungs every 6 hours as needed for Wheezing 1 Inhaler 3    omeprazole (PRILOSEC) 20 MG delayed release capsule Take 1 capsule by mouth 2 times daily (Patient taking differently: Take 20 mg by mouth 2 times daily as needed ) 60 capsule 0    aspirin 81 MG chewable tablet TAKE 1 TABLET BY MOUTH DAILY 30 tablet 5    oxyCODONE-acetaminophen (PERCOCET) 7.5-325 MG per tablet Take 1 tablet by mouth 4 times daily .       insulin glargine (LANTUS) 100 UNIT/ML injection vial Inject 55 Units into the skin nightly (Patient taking differently: Inject into the skin nightly Taking 50 units bid--chnaged on own since not having trulicity) 10 vial 1    Blood Pressure KIT Use as directed 1 kit 0    insulin lispro (HUMALOG KWIKPEN) 200 UNIT/ML SOPN pen Inject 10 Units into the skin 3 times daily as needed for High Blood Sugar 5 pen 2    Insulin Pen Needle 32G X 4 MM MISC Use to give insulin 4 x daily and with sliding scale 120 each 0    nitroGLYCERIN (NITROSTAT) 0.4 MG SL tablet PLACE 1 TABLET UNDER TONGUE EVERY 5 MINUTES AS NEEDED FOR CHEST PAIN. MAX OF 3 DOSES 25 tablet 3    UNABLE TO FIND GLUCOSE TEST STRIPS, TESTING TID, ON INSULIN, E11.9 #300  DAYS  DISPENSE STRIPS BEST COVERED BY INSURANCE 300 each 3    glucagon 1 MG injection Inject 1 mg into the skin See Admin Instructions Follow package directions for low blood sugar. 1 kit 3    UNABLE TO FIND Glucometer #1, E11.9, testing TID. ON INSULIN. Home pt. DISPENSE METER BEST COVERED BY INSURANCE 1 each 0    Lancets MISC #300  DAYS, TESTING TID, E11.9 300 each 3    glucose monitoring kit (FREESTYLE) monitoring kit 1 kit by Does not apply route daily 1 kit 0    Lancet Devices (ACCU-CHEK SOFTCLIX LANCET DEV) MISC 1 Units by Does not apply route 4 times daily (before meals and nightly) 100 each 3    SOFT TOUCH LANCETS MISC 1 strip by Does not apply route 4 times daily 100 each 3     No current facility-administered medications for this visit. Return in about 3 months (around 3/30/2021). An After Visit Summary was printed and given to the patient. Documentation was done using voice recognition dragon software. Every effort was made to ensure accuracy; however, inadvertent  Unintentional computerized transcription errors may be present.

## 2021-01-08 ENCOUNTER — OFFICE VISIT (OUTPATIENT)
Dept: CARDIOLOGY CLINIC | Age: 61
End: 2021-01-08
Payer: COMMERCIAL

## 2021-01-08 VITALS
HEART RATE: 83 BPM | DIASTOLIC BLOOD PRESSURE: 88 MMHG | TEMPERATURE: 96.9 F | SYSTOLIC BLOOD PRESSURE: 136 MMHG | BODY MASS INDEX: 49.2 KG/M2 | WEIGHT: 293 LBS

## 2021-01-08 DIAGNOSIS — I10 ESSENTIAL HYPERTENSION: ICD-10-CM

## 2021-01-08 DIAGNOSIS — E78.5 DYSLIPIDEMIA: Primary | ICD-10-CM

## 2021-01-08 DIAGNOSIS — I25.10 CORONARY ARTERY DISEASE INVOLVING NATIVE CORONARY ARTERY OF NATIVE HEART WITHOUT ANGINA PECTORIS: ICD-10-CM

## 2021-01-08 PROCEDURE — 1036F TOBACCO NON-USER: CPT | Performed by: INTERNAL MEDICINE

## 2021-01-08 PROCEDURE — 3017F COLORECTAL CA SCREEN DOC REV: CPT | Performed by: INTERNAL MEDICINE

## 2021-01-08 PROCEDURE — G8427 DOCREV CUR MEDS BY ELIG CLIN: HCPCS | Performed by: INTERNAL MEDICINE

## 2021-01-08 PROCEDURE — G8417 CALC BMI ABV UP PARAM F/U: HCPCS | Performed by: INTERNAL MEDICINE

## 2021-01-08 PROCEDURE — 99214 OFFICE O/P EST MOD 30 MIN: CPT | Performed by: INTERNAL MEDICINE

## 2021-01-08 PROCEDURE — G8484 FLU IMMUNIZE NO ADMIN: HCPCS | Performed by: INTERNAL MEDICINE

## 2021-01-08 RX ORDER — NITROGLYCERIN 80 MG/1
1 PATCH TRANSDERMAL DAILY
Qty: 30 PATCH | Refills: 3 | Status: SHIPPED | OUTPATIENT
Start: 2021-01-08 | End: 2021-06-11

## 2021-01-08 ASSESSMENT — ENCOUNTER SYMPTOMS
EYES NEGATIVE: 1
GASTROINTESTINAL NEGATIVE: 1
SHORTNESS OF BREATH: 1
ALLERGIC/IMMUNOLOGIC NEGATIVE: 1

## 2021-01-08 NOTE — PROGRESS NOTES
Subjective:      Patient ID: Em Alston is a 56 y.o. female. CC:   Follow up CAD and exertional sweating    Hypertension  Associated symptoms include palpitations and shortness of breath. She has some chest pressure with exertion. Cath showed 50% LAD distally. She has infarction at apex with mild periinfarction ischemia. She has SHAYNA clinically but hasnt had evaualtion yet. .     Review of Systems   Constitutional: Positive for diaphoresis. HENT: Negative. Eyes: Negative. Respiratory: Positive for shortness of breath. Cardiovascular: Positive for palpitations. Gastrointestinal: Negative. Endocrine: Negative. Genitourinary: Negative. Musculoskeletal: Negative. Skin: Negative. Allergic/Immunologic: Negative. Neurological: Negative. Hematological: Negative. Psychiatric/Behavioral: Negative. Vitals:    01/08/21 1549   BP: 136/88   Pulse: 83   Temp: 96.9 °F (36.1 °C)       Exam unchanged on 9/19/19  Objective:   Physical Exam   Constitutional: She is oriented to person, place, and time. She appears well-developed and well-nourished. HENT:   Head: Normocephalic and atraumatic. Eyes: Pupils are equal, round, and reactive to light. EOM are normal.   Neck: Normal range of motion. Neck supple. Cardiovascular: Normal rate and regular rhythm. Exam reveals no friction rub. No murmur heard. Pulmonary/Chest: Effort normal and breath sounds normal.   Abdominal: Soft. Bowel sounds are normal.   Musculoskeletal: Normal range of motion. General: No deformity or edema. Neurological: She is alert and oriented to person, place, and time. No cranial nerve deficit. Skin: Skin is warm and dry. Psychiatric: She has a normal mood and affect.  Her behavior is normal. Judgment and thought content normal.       Assessment:      Patient Active Problem List   Diagnosis    OM (osteomyelitis) ([address]    Arthritis    Osteomyelitis of spine (HCC)    Diskitis    Opiate analgesic use agreement exists    Type 2 diabetes mellitus with diabetic neuropathy (HCC)    Insomnia    Submucous leiomyoma of uterus    PMB (postmenopausal bleeding)    Adiposity    Osteoarthritis of hand    Urticaria    Muscle spasms of both lower extremities    Mild intermittent asthma without complication    Chest pain    Abnormal EKG    Essential hypertension    Age-related cataract of both eyes    Abnormal myocardial perfusion study    Familial hypercholesterolemia    Pneumonia    Recurrent major depressive disorder, in full remission ([facility] [address]      Diagnosis Orders   1. Dyslipidemia     2. Essential hypertension     3. Coronary artery disease involving native coronary artery of native heart without angina pectoris             Plan:      CAD; Not critical at angiogram but medical therapy is limited as she sweats with exertion but has no chest pain. She is diabetic she was told to take her medications in the AM to help control chest discomfort. LVEF 32% on MPI recently. Not taking imdur due to headache and would have her take toprol in the AM.  No orthopnea. Will again try to add nitrodur patch with new insurance. She had big HA with imdur 60 mg daily. PNA:  Treated for PNeumonia in 2/20/18. No problems with cataract surgery. She has SHAYNA clinically and yet still needs a sleep study.

## 2021-02-25 DIAGNOSIS — E11.40 TYPE 2 DIABETES MELLITUS WITH DIABETIC NEUROPATHY, WITH LONG-TERM CURRENT USE OF INSULIN (HCC): ICD-10-CM

## 2021-02-25 DIAGNOSIS — Z79.4 TYPE 2 DIABETES MELLITUS WITH DIABETIC NEUROPATHY, WITH LONG-TERM CURRENT USE OF INSULIN (HCC): ICD-10-CM

## 2021-02-25 RX ORDER — INSULIN GLARGINE 100 [IU]/ML
50 INJECTION, SOLUTION SUBCUTANEOUS NIGHTLY
Qty: 10 PEN | Refills: 5 | Status: SHIPPED | OUTPATIENT
Start: 2021-02-25 | End: 2021-03-26 | Stop reason: SDUPTHER

## 2021-03-26 DIAGNOSIS — E11.40 TYPE 2 DIABETES MELLITUS WITH DIABETIC NEUROPATHY, WITH LONG-TERM CURRENT USE OF INSULIN (HCC): ICD-10-CM

## 2021-03-26 DIAGNOSIS — Z79.4 TYPE 2 DIABETES MELLITUS WITH DIABETIC NEUROPATHY, WITH LONG-TERM CURRENT USE OF INSULIN (HCC): ICD-10-CM

## 2021-03-29 DIAGNOSIS — Z79.4 TYPE 2 DIABETES MELLITUS WITH DIABETIC NEUROPATHY, WITH LONG-TERM CURRENT USE OF INSULIN (HCC): ICD-10-CM

## 2021-03-29 DIAGNOSIS — E11.40 TYPE 2 DIABETES MELLITUS WITH DIABETIC NEUROPATHY, WITH LONG-TERM CURRENT USE OF INSULIN (HCC): ICD-10-CM

## 2021-03-29 DIAGNOSIS — R00.2 PALPITATION: ICD-10-CM

## 2021-03-29 DIAGNOSIS — I25.118 CORONARY ARTERY DISEASE OF NATIVE HEART WITH STABLE ANGINA PECTORIS, UNSPECIFIED VESSEL OR LESION TYPE (HCC): ICD-10-CM

## 2021-03-29 DIAGNOSIS — I10 ESSENTIAL HYPERTENSION: ICD-10-CM

## 2021-03-29 RX ORDER — INSULIN LISPRO 200 [IU]/ML
10 INJECTION, SOLUTION SUBCUTANEOUS 3 TIMES DAILY PRN
Qty: 5 PEN | Refills: 2 | Status: SHIPPED | OUTPATIENT
Start: 2021-03-29 | End: 2021-09-29 | Stop reason: SDUPTHER

## 2021-03-29 RX ORDER — INSULIN GLARGINE 100 [IU]/ML
50 INJECTION, SOLUTION SUBCUTANEOUS NIGHTLY
Qty: 10 PEN | Refills: 5 | Status: SHIPPED | OUTPATIENT
Start: 2021-03-29 | End: 2022-02-16 | Stop reason: SDUPTHER

## 2021-03-29 RX ORDER — METOPROLOL TARTRATE 50 MG/1
TABLET, FILM COATED ORAL
Qty: 60 TABLET | Refills: 5 | Status: SHIPPED | OUTPATIENT
Start: 2021-03-29 | End: 2021-09-29 | Stop reason: SDUPTHER

## 2021-05-27 RX ORDER — ATORVASTATIN CALCIUM 40 MG/1
40 TABLET, FILM COATED ORAL DAILY
Qty: 90 TABLET | Refills: 1 | Status: SHIPPED | OUTPATIENT
Start: 2021-05-27 | End: 2022-02-16

## 2021-06-10 NOTE — TELEPHONE ENCOUNTER
Requested Prescriptions     Pending Prescriptions Disp Refills    nitroGLYCERIN (NITRODUR) 0.4 MG/HR [Pharmacy Med Name: NITROGLYCERIN 0.4 MG/HR PATCH] 30 patch 3     Sig: APPLY 1 PATCH ONTO THE SKIN EVERY DAY          Number: 30    Refills: 3    Last Office Visit: 1/8/2021     Next Office Visit: Visit date not found     Last Labs: 10.19.2020

## 2021-06-11 RX ORDER — NITROGLYCERIN 80 MG/1
PATCH TRANSDERMAL
Qty: 30 PATCH | Refills: 3 | Status: SHIPPED | OUTPATIENT
Start: 2021-06-11 | End: 2022-05-18 | Stop reason: SDUPTHER

## 2021-09-02 DIAGNOSIS — I10 ESSENTIAL HYPERTENSION: ICD-10-CM

## 2021-09-02 RX ORDER — LISINOPRIL AND HYDROCHLOROTHIAZIDE 20; 12.5 MG/1; MG/1
TABLET ORAL
Qty: 30 TABLET | Refills: 0 | OUTPATIENT
Start: 2021-09-02

## 2021-09-29 ENCOUNTER — HOSPITAL ENCOUNTER (EMERGENCY)
Age: 61
Discharge: HOME | End: 2021-09-29
Attending: EMERGENCY MEDICINE
Payer: COMMERCIAL

## 2021-09-29 ENCOUNTER — NURSE TRIAGE (OUTPATIENT)
Dept: OTHER | Facility: CLINIC | Age: 61
End: 2021-09-29

## 2021-09-29 ENCOUNTER — TELEPHONE (OUTPATIENT)
Dept: INTERNAL MEDICINE CLINIC | Age: 61
End: 2021-09-29

## 2021-09-29 VITALS
TEMPERATURE: 99.6 F | SYSTOLIC BLOOD PRESSURE: 142 MMHG | HEIGHT: 68 IN | OXYGEN SATURATION: 96 % | WEIGHT: 280 LBS | DIASTOLIC BLOOD PRESSURE: 101 MMHG | RESPIRATION RATE: 22 BRPM | BODY MASS INDEX: 42.44 KG/M2 | HEART RATE: 104 BPM

## 2021-09-29 DIAGNOSIS — F33.1 MODERATE EPISODE OF RECURRENT MAJOR DEPRESSIVE DISORDER (HCC): ICD-10-CM

## 2021-09-29 DIAGNOSIS — I25.118 CORONARY ARTERY DISEASE OF NATIVE HEART WITH STABLE ANGINA PECTORIS, UNSPECIFIED VESSEL OR LESION TYPE (HCC): ICD-10-CM

## 2021-09-29 DIAGNOSIS — Z79.4 TYPE 2 DIABETES MELLITUS WITH DIABETIC NEUROPATHY, WITH LONG-TERM CURRENT USE OF INSULIN (HCC): ICD-10-CM

## 2021-09-29 DIAGNOSIS — E08.65 DIABETES MELLITUS DUE TO UNDERLYING CONDITION, UNCONTROLLED, WITH HYPERGLYCEMIA (HCC): Primary | ICD-10-CM

## 2021-09-29 DIAGNOSIS — E11.40 TYPE 2 DIABETES MELLITUS WITH DIABETIC NEUROPATHY, WITH LONG-TERM CURRENT USE OF INSULIN (HCC): ICD-10-CM

## 2021-09-29 DIAGNOSIS — R00.2 PALPITATION: ICD-10-CM

## 2021-09-29 DIAGNOSIS — I10 ESSENTIAL HYPERTENSION: ICD-10-CM

## 2021-09-29 DIAGNOSIS — I10 ELEVATED BLOOD PRESSURE READING WITH DIAGNOSIS OF HYPERTENSION: ICD-10-CM

## 2021-09-29 DIAGNOSIS — Z76.0 ENCOUNTER FOR MEDICATION REFILL: ICD-10-CM

## 2021-09-29 LAB
A/G RATIO: 1.2 (ref 1.1–2.2)
ALBUMIN SERPL-MCNC: 4.1 G/DL (ref 3.4–5)
ALP BLD-CCNC: 105 U/L (ref 40–129)
ALT SERPL-CCNC: 8 U/L (ref 10–40)
ANION GAP SERPL CALCULATED.3IONS-SCNC: 17 MMOL/L (ref 3–16)
AST SERPL-CCNC: 13 U/L (ref 15–37)
BACTERIA: ABNORMAL /HPF
BASE EXCESS VENOUS: -2.7 MMOL/L (ref -3–3)
BASOPHILS ABSOLUTE: 0 K/UL (ref 0–0.2)
BASOPHILS RELATIVE PERCENT: 0.6 %
BETA-HYDROXYBUTYRATE: 3.3 MMOL/L (ref 0–0.27)
BILIRUB SERPL-MCNC: 0.3 MG/DL (ref 0–1)
BILIRUBIN URINE: NEGATIVE
BLOOD, URINE: NEGATIVE
BUN BLDV-MCNC: 20 MG/DL (ref 7–20)
CALCIUM SERPL-MCNC: 9.3 MG/DL (ref 8.3–10.6)
CARBOXYHEMOGLOBIN: 1.9 % (ref 0–1.5)
CHLORIDE BLD-SCNC: 96 MMOL/L (ref 99–110)
CLARITY: ABNORMAL
CO2: 20 MMOL/L (ref 21–32)
COLOR: YELLOW
COMMENT UA: ABNORMAL
CREAT SERPL-MCNC: 1 MG/DL (ref 0.6–1.2)
EOSINOPHILS ABSOLUTE: 0.1 K/UL (ref 0–0.6)
EOSINOPHILS RELATIVE PERCENT: 1.8 %
EPITHELIAL CELLS, UA: ABNORMAL /HPF (ref 0–5)
GFR AFRICAN AMERICAN: >60
GFR NON-AFRICAN AMERICAN: 56
GLOBULIN: 3.4 G/DL
GLUCOSE BLD-MCNC: 385 MG/DL (ref 70–99)
GLUCOSE BLD-MCNC: 463 MG/DL (ref 70–99)
GLUCOSE BLD-MCNC: 471 MG/DL (ref 70–99)
GLUCOSE BLD-MCNC: 479 MG/DL (ref 70–99)
GLUCOSE URINE: >=1000 MG/DL
HCO3 VENOUS: 22.5 MMOL/L (ref 23–29)
HCT VFR BLD CALC: 37.2 % (ref 36–48)
HEMOGLOBIN, VEN, REDUCED: 5 %
HEMOGLOBIN: 12.8 G/DL (ref 12–16)
KETONES, URINE: 40 MG/DL
LEUKOCYTE ESTERASE, URINE: NEGATIVE
LYMPHOCYTES ABSOLUTE: 1.6 K/UL (ref 1–5.1)
LYMPHOCYTES RELATIVE PERCENT: 26.7 %
MCH RBC QN AUTO: 30.8 PG (ref 26–34)
MCHC RBC AUTO-ENTMCNC: 34.5 G/DL (ref 31–36)
MCV RBC AUTO: 89.3 FL (ref 80–100)
METHEMOGLOBIN VENOUS: 0.4 %
MICROSCOPIC EXAMINATION: YES
MONOCYTES ABSOLUTE: 0.4 K/UL (ref 0–1.3)
MONOCYTES RELATIVE PERCENT: 7 %
NEUTROPHILS ABSOLUTE: 3.9 K/UL (ref 1.7–7.7)
NEUTROPHILS RELATIVE PERCENT: 63.9 %
NITRITE, URINE: NEGATIVE
O2 CONTENT, VEN: 17 VOL %
O2 SAT, VEN: 95 %
O2 THERAPY: ABNORMAL
PCO2, VEN: 39.8 MMHG (ref 40–50)
PDW BLD-RTO: 13.4 % (ref 12.4–15.4)
PERFORMED ON: ABNORMAL
PH UA: 5.5 (ref 5–8)
PH VENOUS: 7.36 (ref 7.35–7.45)
PLATELET # BLD: 228 K/UL (ref 135–450)
PMV BLD AUTO: 8.6 FL (ref 5–10.5)
PO2, VEN: 74.3 MMHG (ref 25–40)
POTASSIUM REFLEX MAGNESIUM: 4.5 MMOL/L (ref 3.5–5.1)
PROTEIN UA: NEGATIVE MG/DL
RBC # BLD: 4.16 M/UL (ref 4–5.2)
RBC UA: ABNORMAL /HPF (ref 0–4)
SODIUM BLD-SCNC: 133 MMOL/L (ref 136–145)
SPECIFIC GRAVITY UA: >1.03 (ref 1–1.03)
TCO2 CALC VENOUS: 53 MMOL/L
TOTAL PROTEIN: 7.5 G/DL (ref 6.4–8.2)
URINE REFLEX TO CULTURE: ABNORMAL
URINE TYPE: ABNORMAL
UROBILINOGEN, URINE: 0.2 E.U./DL
WBC # BLD: 6.1 K/UL (ref 4–11)
WBC UA: ABNORMAL /HPF (ref 0–5)

## 2021-09-29 PROCEDURE — 6370000000 HC RX 637 (ALT 250 FOR IP): Performed by: EMERGENCY MEDICINE

## 2021-09-29 PROCEDURE — 2580000003 HC RX 258: Performed by: EMERGENCY MEDICINE

## 2021-09-29 PROCEDURE — 96374 THER/PROPH/DIAG INJ IV PUSH: CPT

## 2021-09-29 PROCEDURE — 99284 EMERGENCY DEPT VISIT MOD MDM: CPT

## 2021-09-29 PROCEDURE — 80053 COMPREHEN METABOLIC PANEL: CPT

## 2021-09-29 PROCEDURE — 81001 URINALYSIS AUTO W/SCOPE: CPT

## 2021-09-29 PROCEDURE — 82803 BLOOD GASES ANY COMBINATION: CPT

## 2021-09-29 PROCEDURE — 82010 KETONE BODYS QUAN: CPT

## 2021-09-29 PROCEDURE — 85025 COMPLETE CBC W/AUTO DIFF WBC: CPT

## 2021-09-29 RX ORDER — 0.9 % SODIUM CHLORIDE 0.9 %
1000 INTRAVENOUS SOLUTION INTRAVENOUS ONCE
Status: COMPLETED | OUTPATIENT
Start: 2021-09-29 | End: 2021-09-29

## 2021-09-29 RX ORDER — INSULIN LISPRO 200 [IU]/ML
10 INJECTION, SOLUTION SUBCUTANEOUS 3 TIMES DAILY PRN
Qty: 5 PEN | Refills: 2 | Status: SHIPPED | OUTPATIENT
Start: 2021-09-29 | End: 2022-02-16 | Stop reason: SDUPTHER

## 2021-09-29 RX ORDER — LISINOPRIL AND HYDROCHLOROTHIAZIDE 20; 12.5 MG/1; MG/1
TABLET ORAL
Qty: 60 TABLET | Refills: 0 | Status: SHIPPED | OUTPATIENT
Start: 2021-09-29 | End: 2022-02-16 | Stop reason: SDUPTHER

## 2021-09-29 RX ORDER — DULOXETIN HYDROCHLORIDE 60 MG/1
60 CAPSULE, DELAYED RELEASE ORAL DAILY
Qty: 60 CAPSULE | Refills: 0 | Status: SHIPPED | OUTPATIENT
Start: 2021-09-29 | End: 2022-02-16

## 2021-09-29 RX ORDER — METOPROLOL TARTRATE 50 MG/1
TABLET, FILM COATED ORAL
Qty: 60 TABLET | Refills: 0 | Status: SHIPPED | OUTPATIENT
Start: 2021-09-29 | End: 2022-05-18 | Stop reason: SDUPTHER

## 2021-09-29 RX ORDER — AMLODIPINE BESYLATE 10 MG/1
10 TABLET ORAL DAILY
Qty: 60 TABLET | Refills: 0 | Status: SHIPPED | OUTPATIENT
Start: 2021-09-29 | End: 2022-05-18 | Stop reason: SDUPTHER

## 2021-09-29 RX ADMIN — INSULIN HUMAN 10 UNITS: 100 INJECTION, SOLUTION PARENTERAL at 13:25

## 2021-09-29 RX ADMIN — SODIUM CHLORIDE 1000 ML: 9 INJECTION, SOLUTION INTRAVENOUS at 11:50

## 2021-09-29 NOTE — ED NOTES
Bed: 26  Expected date:   Expected time:   Means of arrival:   Comments:  [id number], RN  09/29/21 1119

## 2021-09-29 NOTE — ED PROVIDER NOTES
[facility] Emergency Department      Pt Name: Evangelina Whittaker  MRN: [id number]  [place] 1960  Date of evaluation: 9/29/2021  Provider: Geraldo Weiss MD  CHIEF COMPLAINT  Chief Complaint   Patient presents with    Hyperglycemia     Pt from home reporting not having insulin for 4 months. States, \"my meter at home is reading 'High.'      HPI  Evangelina Whittaker is a 59 y.o. female who presents because of concern about high blood sugar. Patient has been without health insurance for many months and ran out of insulin about 4 months ago. She says that for the past 2 weeks, her meter has not been giving her readings but has been registering \"high\". Patient reports some nausea today but no vomiting. She has had some concerns that she might have gastroparesis because she had nausea and vomiting recently and has needed to be very careful with her diet. REVIEW OF SYSTEMS:  No fever, no dietary change, no vomiting, no abdominal pain Pertinent positives and negatives as per the HPI. All other review of systems reviewed and negative. Nursing notes reviewed. PAST MEDICAL HISTORY  Past Medical History:   Diagnosis Date    Anxiety     Arthritis     Asthma     as child    Bilateral chronic knee pain     sees pain management    Degenerative disc disease, cervical MRI 2011    Multilevel cervical degenerative this disease most significant at the C7-T1 level where there is probable impingement of the exiting right C8 nerve root caused by disc herniation.     Depression     Diabetes     Kim Ceballos, NP [facility]    High blood pressure     Hypertriglyceridemia     Lumbar herniated disc MRI 2011    L5-S1    Morbid obesity ([address]     Opiate dependence ([address]     Osteomyelitis ([address]     Pneumonia     as child     SURGICAL HISTORY  Past Surgical History:   Procedure Laterality Date    CARDIAC CATHETERIZATION  11/2017    1.  Diffusely diseased distal LAD.  Distal LAD has a 50% narrowing in two --- MEDICATIONS:  No current facility-administered medications on file prior to encounter. Current Outpatient Medications on File Prior to Encounter   Medication Sig Dispense Refill    meloxicam (MOBIC) 15 MG tablet Take 15 mg by mouth      omeprazole (PRILOSEC) 20 MG delayed release capsule Take 1 capsule by mouth 2 times daily (Patient taking differently: Take 20 mg by mouth 2 times daily as needed ) 60 capsule 0    aspirin 81 MG chewable tablet TAKE 1 TABLET BY MOUTH DAILY (Patient taking differently: Take 81 mg by mouth daily Report taking 325mg) 30 tablet 5    oxyCODONE-acetaminophen (PERCOCET) 7.5-325 MG per tablet Take 1 tablet by mouth 4 times daily .  nitroGLYCERIN (NITRODUR) 0.4 MG/HR APPLY 1 PATCH ONTO THE SKIN EVERY DAY 30 patch 3    atorvastatin (LIPITOR) 40 MG tablet Take 1 tablet by mouth daily 90 tablet 1    insulin glargine (LANTUS SOLOSTAR) 100 UNIT/ML injection pen Inject 50 Units into the skin nightly 10 pen 5    Dulaglutide 1.5 MG/0.5ML SOPN INJECT 1.5 MG INTO THE SKIN ONCE A WEEK INDICATIONS: DIABETES 4 pen 5    Insulin Pen Needle 32G X 4 MM MISC Use to give LA and SA insulin 5 x daily 150 each 5    blood glucose test strips (FREESTYLE LITE) strip 1 each by In Vitro route 3 times daily 100 each 5    Blood Pressure KIT Use as directed 1 kit 0    nitroGLYCERIN (NITROSTAT) 0.4 MG SL tablet PLACE 1 TABLET UNDER TONGUE EVERY 5 MINUTES AS NEEDED FOR CHEST PAIN. MAX OF 3 DOSES 25 tablet 3    UNABLE TO FIND GLUCOSE TEST STRIPS, TESTING TID, ON INSULIN, E11.9 #300  DAYS  DISPENSE STRIPS BEST COVERED BY INSURANCE 300 each 3    albuterol sulfate HFA (PROAIR HFA) 108 (90 Base) MCG/ACT inhaler Inhale 2 puffs into the lungs every 6 hours as needed for Wheezing 1 Inhaler 3    glucagon 1 MG injection Inject 1 mg into the skin See Admin Instructions Follow package directions for low blood sugar. 1 kit 3    UNABLE TO FIND Glucometer #1, E11.9, testing TID. ON INSULIN.   Home pt.  DISPENSE METER BEST COVERED BY INSURANCE 1 each 0    Lancets MISC #300  DAYS, TESTING TID, E11.9 300 each 3    glucose monitoring kit (FREESTYLE) monitoring kit 1 kit by Does not apply route daily 1 kit 0    Lancet Devices (ACCU-CHEK SOFTCLIX LANCET DEV) MISC 1 Units by Does not apply route 4 times daily (before meals and nightly) 100 each 3    SOFT TOUCH LANCETS MISC 1 strip by Does not apply route 4 times daily 100 each 3     ALLERGIES  Benadryl [diphenhydramine hcl], Zolpidem tartrate [zolpidem tartrate er], and Diphenhydramine  FAMILY HISTORY:   Family History   Problem Relation Age of Onset    Lung Cancer Mother 48        + tob    Osteoarthritis Other     Cancer Other     Diabetes Other     Hypertension Other     Stroke Other     Heart Disease Other     Diabetes Maternal Grandmother      SOCIAL HISTORY:  Social History     Tobacco Use    Smoking status: Never Smoker    Smokeless tobacco: Never Used   Substance Use Topics    Alcohol use: No     Alcohol/week: 0.0 standard drinks    Drug use: No     IMMUNIZATIONS:  Noncontributory    PHYSICAL EXAM  VITAL SIGNS:  Blood pressure 155/84, pulse 106, temperature 99.6 °F (37.6 °C), temperature source Oral, resp. rate 22, height 5' 8\" (1.727 m), weight 280 lb (127 kg), SpO2 99 %, not currently breastfeeding. Constitutional:  59 y.o. female alert, nontoxic  HENT:  Atraumatic, mucous membranes moist  Eyes:   Conjunctiva clear, no icterus  Neck:  Supple, no adenopathy  Cardiovascular:  Regular, no rubs, no discernible murmur  Thorax & Lungs:  No accessory muscle usage, clear  Abdomen:  Soft, non distended, no tenderness  Back:  No deformity  Genitalia:  Deferred  Rectal:  Deferred  Extremities:  No cyanosis, no edema  Skin:  Warm, dry  Neurologic:  Alert, no slurred speech, coordination normal, no focal deficits  Psychiatric:  Affect appropriate    DIAGNOSTIC RESULTS:  Labs resulted at the time of this note reviewed.   Labs Reviewed COMPREHENSIVE METABOLIC PANEL W/ REFLEX TO MG FOR LOW K - Abnormal; Notable for the following components:       Result Value    Sodium 133 (*)     Chloride 96 (*)     CO2 20 (*)     Anion Gap 17 (*)     Glucose 479 (*)     GFR Non- 56 (*)     ALT 8 (*)     AST 13 (*)     All other components within normal limits    Narrative:     Performed at:  [facility] [address], [place] [employer] [place]   Phone [phone]   BLOOD GAS, VENOUS - Abnormal; Notable for the following components:    pCO2, Naren 39.8 (*)     pO2, Naren 74.3 (*)     HCO3, Venous 22.5 (*)     Carboxyhemoglobin 1.9 (*)     All other components within normal limits    Narrative:     Performed at:  [facility] [address], [place] [employer] [place]   Phone [phone]   URINE RT REFLEX TO CULTURE - Abnormal; Notable for the following components:    Clarity, UA CLOUDY (*)     Glucose, Ur >=1000 (*)     Ketones, Urine 40 (*)     All other components within normal limits    Narrative:     Performed at:  [facility] [address], [place] [employer]   Phone [phone]   BETA-HYDROXYBUTYRATE - Abnormal; Notable for the following components:    Beta-Hydroxybutyrate 3.30 (*)     All other components within normal limits    Narrative:     Performed at:  [facility] [address], [place] [employer] [place]   Phone [phone]   MICROSCOPIC URINALYSIS - Abnormal; Notable for the following components:    Bacteria, UA Rare (*)     All other components within normal limits    Narrative:     Performed at:  [facility] [address], [place] [employer]   Phone [phone]   POCT GLUCOSE - Abnormal; Notable for the following components:    POC Glucose 471 (*)     All other components within normal limits    Narrative:     Performed at:  [facility]  [facility] [address],  [place], [place] [address]   Phone [phone]   POCT GLUCOSE - Abnormal; Notable for the following components:    POC Glucose 463 (*)     All other components within normal limits    Narrative:     Performed at:  [facility] [address],  [place], [place] [address]   Phone [phone]   POCT GLUCOSE - Abnormal; Notable for the following components:    POC Glucose 385 (*)     All other components within normal limits    Narrative:     Performed at:  [facility] [address],  [place], [place] [address]   Phone [phone]   CBC WITH AUTO DIFFERENTIAL    Narrative:     Performed at:  [facility] [address],  [place], [place] [email or web address]   Phone [phone]   POCT GLUCOSE     ED COURSE:    Medications administered:  Medications   0.9 % sodium chloride bolus (0 mLs IntraVENous Stopped 9/29/21 1411)   insulin regular (HUMULIN R;NOVOLIN R) injection 10 Units (10 Units IntraVENous Given 9/29/21 1325)     Vitals:    09/29/21 1230 09/29/21 1301 09/29/21 1409 09/29/21 1430   BP: (!) 157/116 (!) 133/109 (!) 173/96 (!) 142/101   Pulse: 104 108 106 104   Resp:       Temp:       TempSrc:       SpO2: 99% 94% 98% 96%   Weight:       Height:         PROCEDURES:  None    CRITICAL CARE:  None    CONSULTATIONS:  None    MEDICAL DECISION MAKING: Haritha De Jesus is a 59 y.o. female who presented because of a concern for high blood sugar. She does have a mild degree of anion gap acidosis. We discussed admission and she prefers to go home. She lives alone with her dog. She is currently without health insurance but does have a plan to obtain her medicines. I will prescribe a brief course of medicine while she is between providers. BP elevated but she is currently also off of blood pressure medicines, which I will also refill. Haritha De Jesus was given appropriate discharge instructions.   Referral to follow up provider. FOLLOW UP:    [facility] [id number] [phone]    Schedule an appointment as soon as possible for a visit       [facility] Emergency Department  [address]  [phone]  Go to   If symptoms worsen    FINAL IMPRESSION:  Hyperglycemia, htn, medication refill    (Please note that I used voice recognition software to generate this note.   Occasionally words are mistranscribed despite my efforts to edit errors.)        Kayy Matute MD  09/30/21 4367

## 2021-09-29 NOTE — TELEPHONE ENCOUNTER
Today I spoke to the triage RN regarding patient concerns including the following: Elevated BG, and patient unable to afford medication. Medical, surgical and social histories, as well as medications and most recent office notes were reviewed as a part of my review. Based on symptoms / history, I recommended the patient seek further evaluation via PCP office asap. Will route note to PCP of record to ensure their awareness. Below find my phone number and e-mail, both of which are ways to contact me if anyone has questions. Pt BG right now is 300's. Pt s/s consist of increased thirst and urination. Talked with Triage RN about pt possibly buying insulin OTC at [facility]. Also pt may qualify for Med assit please help set her up if possible. Baylee Parada  MSN APRN-CNP  Second-Level Triage [facility] Service  [phone]  [email or web address]

## 2021-09-29 NOTE — TELEPHONE ENCOUNTER
Reason for Disposition   Blood glucose > 240 mg/dL (13.3 mmol/L) AND vomiting AND unable to check for ketones (in blood or urine)    Answer Assessment - Initial Assessment Questions  1. BLOOD GLUCOSE: \"What is your blood glucose level? \"       Read high at 130am which indicates over 499, this am 360    2. ONSET: \"When did you check the blood glucose? \"      This am    3. USUAL RANGE: \"What is your glucose level usually? \" (e.g., usual fasting morning value, usual evening value)      Usually 100's    4. KETONES: \"Do you check for ketones (urine or blood test strips)? \" If yes, ask: \"What does the test show now? \"      Unsure    5. TYPE 1 or 2:  \"Do you know what type of diabetes you have? \"  (e.g., Type 1, Type 2, Gestational; doesn't know)       Type 2    6. INSULIN: \"Do you take insulin? \" \"What type of insulin(s) do you use? What is the mode of delivery? (syringe, pen; injection or pump)? \"       Hunmolog, lantus and trulicity    7. DIABETES PILLS: \"Do you take any pills for your diabetes? \" If yes, ask: \"Have you missed taking any pills recently? \"      No pills    8. OTHER SYMPTOMS: \"Do you have any symptoms? \" (e.g., fever, frequent urination, difficulty breathing, dizziness, weakness, vomiting)      Frequent urination, vomited once    9. PREGNANCY: \"Is there any chance you are pregnant? \" \"When was your last menstrual period? \"      n/a    Protocols used: DIABETES - HIGH BLOOD SUGAR-ADULT-OH    Received call from [facility] with Red Flag Complaint.     Brief description of triage: as above pt has not been using her insulin for 3 months states can not afford it states has been drinking a lot and urinary frequency no fevers not sick did vomit 1 time    Triage indicates for patient to ucc/er or pcp with approval    2nd level triage completed with Baylee Parada NP; provider recommends patient be seen by pcp today and to call [address] and see if can get humolog insulin over the counter and to take

## 2021-12-19 ENCOUNTER — APPOINTMENT (OUTPATIENT)
Dept: CT IMAGING | Age: 61
End: 2021-12-19
Payer: COMMERCIAL

## 2021-12-19 ENCOUNTER — HOSPITAL ENCOUNTER (EMERGENCY)
Age: 61
Discharge: HOME | End: 2021-12-19
Attending: EMERGENCY MEDICINE
Payer: COMMERCIAL

## 2021-12-19 VITALS
OXYGEN SATURATION: 99 % | RESPIRATION RATE: 16 BRPM | HEART RATE: 96 BPM | SYSTOLIC BLOOD PRESSURE: 144 MMHG | TEMPERATURE: 98.1 F | BODY MASS INDEX: 44.93 KG/M2 | HEIGHT: 67 IN | WEIGHT: 286.3 LBS | DIASTOLIC BLOOD PRESSURE: 78 MMHG

## 2021-12-19 DIAGNOSIS — D25.9 UTERINE LEIOMYOMA, UNSPECIFIED LOCATION: ICD-10-CM

## 2021-12-19 DIAGNOSIS — N93.9 VAGINAL BLEEDING: Primary | ICD-10-CM

## 2021-12-19 LAB
A/G RATIO: 1.3 (ref 1.1–2.2)
ALBUMIN SERPL-MCNC: 4 G/DL (ref 3.4–5)
ALP BLD-CCNC: 96 U/L (ref 40–129)
ALT SERPL-CCNC: <5 U/L (ref 10–40)
ANION GAP SERPL CALCULATED.3IONS-SCNC: 12 MMOL/L (ref 3–16)
AST SERPL-CCNC: 11 U/L (ref 15–37)
BASOPHILS ABSOLUTE: 0.1 K/UL (ref 0–0.2)
BASOPHILS RELATIVE PERCENT: 1.1 %
BILIRUB SERPL-MCNC: 0.5 MG/DL (ref 0–1)
BILIRUBIN URINE: NEGATIVE
BLOOD, URINE: ABNORMAL
BUN BLDV-MCNC: 18 MG/DL (ref 7–20)
CALCIUM SERPL-MCNC: 9.3 MG/DL (ref 8.3–10.6)
CHLORIDE BLD-SCNC: 104 MMOL/L (ref 99–110)
CLARITY: CLEAR
CO2: 25 MMOL/L (ref 21–32)
COLOR: YELLOW
CREAT SERPL-MCNC: 1 MG/DL (ref 0.6–1.2)
EOSINOPHILS ABSOLUTE: 0.1 K/UL (ref 0–0.6)
EOSINOPHILS RELATIVE PERCENT: 1.8 %
EPITHELIAL CELLS, UA: 1 /HPF (ref 0–5)
GFR AFRICAN AMERICAN: >60
GFR NON-AFRICAN AMERICAN: 56
GLUCOSE BLD-MCNC: 160 MG/DL (ref 70–99)
GLUCOSE URINE: NEGATIVE MG/DL
HCG(URINE) PREGNANCY TEST: NEGATIVE
HCT VFR BLD CALC: 33.3 % (ref 36–48)
HEMOGLOBIN: 11.5 G/DL (ref 12–16)
HYALINE CASTS: 1 /LPF (ref 0–8)
KETONES, URINE: NEGATIVE MG/DL
LEUKOCYTE ESTERASE, URINE: NEGATIVE
LIPASE: 19 U/L (ref 13–60)
LYMPHOCYTES ABSOLUTE: 2 K/UL (ref 1–5.1)
LYMPHOCYTES RELATIVE PERCENT: 29.9 %
MCH RBC QN AUTO: 30.1 PG (ref 26–34)
MCHC RBC AUTO-ENTMCNC: 34.4 G/DL (ref 31–36)
MCV RBC AUTO: 87.5 FL (ref 80–100)
MICROSCOPIC EXAMINATION: YES
MONOCYTES ABSOLUTE: 0.5 K/UL (ref 0–1.3)
MONOCYTES RELATIVE PERCENT: 7.6 %
NEUTROPHILS ABSOLUTE: 3.9 K/UL (ref 1.7–7.7)
NEUTROPHILS RELATIVE PERCENT: 59.6 %
NITRITE, URINE: NEGATIVE
PDW BLD-RTO: 14.6 % (ref 12.4–15.4)
PH UA: 6 (ref 5–8)
PLATELET # BLD: 296 K/UL (ref 135–450)
PMV BLD AUTO: 7.5 FL (ref 5–10.5)
POTASSIUM SERPL-SCNC: 4.2 MMOL/L (ref 3.5–5.1)
PROTEIN UA: NEGATIVE MG/DL
RBC # BLD: 3.81 M/UL (ref 4–5.2)
RBC UA: 1 /HPF (ref 0–4)
SODIUM BLD-SCNC: 141 MMOL/L (ref 136–145)
SPECIFIC GRAVITY UA: 1.02 (ref 1–1.03)
TOTAL PROTEIN: 7.2 G/DL (ref 6.4–8.2)
TROPONIN: <0.01 NG/ML
URINE REFLEX TO CULTURE: ABNORMAL
URINE TYPE: ABNORMAL
UROBILINOGEN, URINE: 0.2 E.U./DL
WBC # BLD: 6.6 K/UL (ref 4–11)
WBC UA: 1 /HPF (ref 0–5)

## 2021-12-19 PROCEDURE — 74177 CT ABD & PELVIS W/CONTRAST: CPT

## 2021-12-19 PROCEDURE — 36415 COLL VENOUS BLD VENIPUNCTURE: CPT

## 2021-12-19 PROCEDURE — 6360000004 HC RX CONTRAST MEDICATION: Performed by: PHYSICIAN ASSISTANT

## 2021-12-19 PROCEDURE — 83690 ASSAY OF LIPASE: CPT

## 2021-12-19 PROCEDURE — 81001 URINALYSIS AUTO W/SCOPE: CPT

## 2021-12-19 PROCEDURE — 99283 EMERGENCY DEPT VISIT LOW MDM: CPT

## 2021-12-19 PROCEDURE — 80053 COMPREHEN METABOLIC PANEL: CPT

## 2021-12-19 PROCEDURE — 85025 COMPLETE CBC W/AUTO DIFF WBC: CPT

## 2021-12-19 PROCEDURE — 84484 ASSAY OF TROPONIN QUANT: CPT

## 2021-12-19 PROCEDURE — 84703 CHORIONIC GONADOTROPIN ASSAY: CPT

## 2021-12-19 PROCEDURE — 93005 ELECTROCARDIOGRAM TRACING: CPT | Performed by: EMERGENCY MEDICINE

## 2021-12-19 RX ADMIN — IOPAMIDOL 75 ML: 755 INJECTION, SOLUTION INTRAVENOUS at 13:51

## 2021-12-19 ASSESSMENT — ENCOUNTER SYMPTOMS
BLOOD IN STOOL: 0
BACK PAIN: 0
COUGH: 0
ABDOMINAL DISTENTION: 0
DIARRHEA: 0
STRIDOR: 0
NAUSEA: 1
WHEEZING: 0
VOMITING: 0
CONSTIPATION: 0
RECTAL PAIN: 0
ANAL BLEEDING: 0
ABDOMINAL PAIN: 1
SHORTNESS OF BREATH: 0
COLOR CHANGE: 0

## 2021-12-19 ASSESSMENT — PAIN DESCRIPTION - LOCATION: LOCATION: ABDOMEN

## 2021-12-19 ASSESSMENT — PAIN DESCRIPTION - DESCRIPTORS: DESCRIPTORS: BURNING

## 2021-12-19 ASSESSMENT — PAIN SCALES - GENERAL: PAINLEVEL_OUTOF10: 8

## 2021-12-19 ASSESSMENT — PAIN DESCRIPTION - PAIN TYPE: TYPE: ACUTE PAIN

## 2021-12-19 ASSESSMENT — PAIN DESCRIPTION - FREQUENCY: FREQUENCY: CONTINUOUS

## 2021-12-19 NOTE — ED PROVIDER NOTES
[facility]        Pt Name: Haley Cowan  MRN: [id number]  [place] 1960  Date of evaluation: 12/19/2021  Provider: Janelle Meeks PA-C  PCP: Kena Friedman MD  Note Started: 12:51 PM EST        I have seen and evaluated this patient with my supervising physician Mary Kay Mosqueda, [address] [place] [address]       Chief Complaint   Patient presents with    Vaginal Bleeding     Patient arrives via triage stating she has been having vaginal bleeding for the past 4 days, going through 2-3 pads every day. Also complaining of abdominal burning. States she has had something similar in the past 5 years and she had a procedure done - unknown that that was. HISTORY OF PRESENT ILLNESS   (Location, Timing/Onset, Context/Setting, Quality, Duration, Modifying Factors, Severity, Associated Signs and Symptoms)  Note limiting factors. Chief Complaint: Vaginal bleeding    Haley Cowan is a 59 y.o. female who presents to the emergency department complaining of vaginal bleeding starting 4 days ago. For about 2 to 3 days, she has had intermittent burning sensation in the epigastrium. Nausea started today. She does have history of uterine fibroid and states that several years ago, a gynecologist had performed some sort of procedure to stop the vaginal bleeding from a fibroid. She denies pelvic pain or cramping, dizziness, lightheadedness, vomiting, diarrhea, constipation, bloody or black stool or acute urinary symptoms. Denies history of prolapsed uterus. She is going through 2-3 pads per day. Nursing Notes were all reviewed and agreed with or any disagreements were addressed in the HPI. REVIEW OF SYSTEMS    (2-9 systems for level 4, 10 or more for level 5)     Review of Systems   Constitutional: Negative for chills and fever. HENT: Negative. Eyes: Negative for visual disturbance.    Respiratory: Negative for cough, shortness of breath, wheezing and stridor. Cardiovascular: Negative for chest pain, palpitations and leg swelling. Gastrointestinal: Positive for abdominal pain and nausea. Negative for abdominal distention, anal bleeding, blood in stool, constipation, diarrhea, rectal pain and vomiting. Endocrine: Negative. Genitourinary: Positive for vaginal bleeding. Negative for decreased urine volume, dysuria, flank pain, frequency, genital sores, hematuria, pelvic pain, urgency, vaginal discharge and vaginal pain. Musculoskeletal: Negative for back pain, neck pain and neck stiffness. Skin: Negative for color change, pallor, rash and wound. Neurological: Negative for dizziness, tremors, seizures, syncope, facial asymmetry, speech difficulty, weakness, light-headedness, numbness and headaches. Psychiatric/Behavioral: Negative for confusion. All other systems reviewed and are negative. Positives and Pertinent negatives as per HPI. Except as noted above in the ROS, all other systems were reviewed and negative. PAST MEDICAL HISTORY     Past Medical History:   Diagnosis Date    Anxiety     Arthritis     Asthma     as child    Bilateral chronic knee pain     sees pain management    Degenerative disc disease, cervical MRI 2011    Multilevel cervical degenerative this disease most significant at the C7-T1 level where there is probable impingement of the exiting right C8 nerve root caused by disc herniation.     Depression     Diabetes     Jen Oscar, NP at [facility]    High blood pressure     Hypertriglyceridemia     Lumbar herniated disc MRI 2011    L5-S1    Morbid obesity ([address]     Opiate dependence ([address]     Osteomyelitis ([address]     Pneumonia     as child         SURGICAL HISTORY     Past Surgical History:   Procedure Laterality Date    CARDIAC CATHETERIZATION  11/2017    1.  Diffusely diseased distal LAD.  Distal LAD has a 50% narrowing in two ---         CURRENTMEDICATIONS       Previous Medications    ALBUTEROL SULFATE HFA (PROAIR HFA) 108 (90 BASE) MCG/ACT INHALER    Inhale 2 puffs into the lungs every 6 hours as needed for Wheezing    AMLODIPINE (NORVASC) 10 MG TABLET    Take 1 tablet by mouth daily    ASPIRIN 81 MG CHEWABLE TABLET    TAKE 1 TABLET BY MOUTH DAILY    ATORVASTATIN (LIPITOR) 40 MG TABLET    Take 1 tablet by mouth daily    BLOOD GLUCOSE TEST STRIPS (FREESTYLE LITE) STRIP    1 each by In Vitro route 3 times daily    BLOOD PRESSURE KIT    Use as directed    DULAGLUTIDE 1.5 MG/0.5ML SOPN    INJECT 1.5 MG INTO THE SKIN ONCE A WEEK INDICATIONS: DIABETES    DULOXETINE (CYMBALTA) 60 MG EXTENDED RELEASE CAPSULE    Take 1 capsule by mouth daily    GLUCAGON 1 MG INJECTION    Inject 1 mg into the skin See Admin Instructions Follow package directions for low blood sugar.     GLUCOSE MONITORING KIT (FREESTYLE) MONITORING KIT    1 kit by Does not apply route daily    INSULIN GLARGINE (LANTUS SOLOSTAR) 100 UNIT/ML INJECTION PEN    Inject 50 Units into the skin nightly    INSULIN LISPRO (HUMALOG KWIKPEN) 200 UNIT/ML SOPN PEN    Inject 10 Units into the skin 3 times daily as needed for High Blood Sugar (please continue your sliding scale as previously defined by your doctor)    INSULIN PEN NEEDLE 32G X 4 MM MISC    Use to give LA and SA insulin 5 x daily    LANCET DEVICES (ACCU-CHEK SOFTCLIX LANCET DEV) MISC    1 Units by Does not apply route 4 times daily (before meals and nightly)    LANCETS MISC    #300  DAYS, TESTING TID, E11.9    LISINOPRIL-HYDROCHLOROTHIAZIDE (PRINZIDE;ZESTORETIC) 20-12.5 MG PER TABLET    TAKE 1 TABLET BY MOUTH EVERY DAY    MELOXICAM (MOBIC) 15 MG TABLET    Take 15 mg by mouth    METFORMIN (GLUCOPHAGE) 1000 MG TABLET    Take 1 tablet by mouth nightly    METOPROLOL TARTRATE (LOPRESSOR) 50 MG TABLET    TAKE 1 TABLET BY MOUTH TWICE A DAY    NITROGLYCERIN (NITRODUR) 0.4 MG/HR    APPLY 1 PATCH ONTO THE SKIN EVERY DAY    NITROGLYCERIN (NITROSTAT) 0.4 MG SL TABLET    PLACE 1 TABLET UNDER TONGUE EVERY 5 MINUTES AS NEEDED FOR CHEST PAIN. MAX OF 3 DOSES    OMEPRAZOLE (PRILOSEC) 20 MG DELAYED RELEASE CAPSULE    Take 1 capsule by mouth 2 times daily    OXYCODONE-ACETAMINOPHEN (PERCOCET) 7.5-325 MG PER TABLET    Take 1 tablet by mouth 4 times daily . SOFT TOUCH LANCETS MISC    1 strip by Does not apply route 4 times daily    UNABLE TO FIND    Glucometer #1, E11.9, testing TID. ON INSULIN. Home pt. DISPENSE METER BEST COVERED BY INSURANCE    UNABLE TO FIND    GLUCOSE TEST STRIPS, TESTING TID, ON INSULIN, E11.9 #300  DAYS  DISPENSE STRIPS BEST COVERED BY INSURANCE         ALLERGIES     Benadryl [diphenhydramine hcl], Zolpidem tartrate [zolpidem tartrate er], and Diphenhydramine    FAMILYHISTORY       Family History   Problem Relation Age of Onset    Lung Cancer Mother 48        + tob    Osteoarthritis Other     Cancer Other     Diabetes Other     Hypertension Other     Stroke Other     Heart Disease Other     Diabetes Maternal Grandmother           SOCIAL HISTORY       Social History     Tobacco Use    Smoking status: Never Smoker    Smokeless tobacco: Never Used   Substance Use Topics    Alcohol use: Yes     Alcohol/week: 0.0 standard drinks     Comment: Wine nightly    Drug use: No       SCREENINGS             PHYSICAL EXAM    (up to 7 for level 4, 8 or more for level 5)     ED Triage Vitals [12/19/21 1232]   BP Temp Temp Source Pulse Resp SpO2 Height Weight   (!) 189/102 98.1 °F (36.7 °C) Oral 96 16 99 % 5' 7\" (1.702 m) 286 lb 4.8 oz (129.9 kg)       Physical Exam  Vitals and nursing note reviewed. Constitutional:       Appearance: Normal appearance. She is well-developed. She is obese. She is not toxic-appearing or diaphoretic. HENT:      Head: Normocephalic and atraumatic. Right Ear: External ear normal.      Left Ear: External ear normal.      Nose: Nose normal.      Mouth/Throat:      Mouth: Mucous membranes are moist.      Pharynx: Oropharynx is clear. Eyes:      General: No scleral icterus. Right eye: No discharge. Left eye: No discharge. Extraocular Movements: Extraocular movements intact. Conjunctiva/sclera: Conjunctivae normal.      Pupils: Pupils are equal, round, and reactive to light. Cardiovascular:      Rate and Rhythm: Normal rate. Pulmonary:      Effort: Pulmonary effort is normal.      Breath sounds: Normal breath sounds. Abdominal:      General: Bowel sounds are normal. There is no abdominal bruit. Palpations: Abdomen is soft. There is no pulsatile mass. Tenderness: There is no abdominal tenderness. There is no right CVA tenderness, left CVA tenderness, guarding or rebound. Negative signs include Sellers's sign, Rovsing's sign, McBurney's sign, psoas sign and obturator sign. Musculoskeletal:         General: Normal range of motion. Cervical back: Normal range of motion. Skin:     General: Skin is warm and dry. Coloration: Skin is not jaundiced or pale. Findings: No bruising, erythema, lesion or rash. Neurological:      General: No focal deficit present. Mental Status: She is alert and oriented to person, place, and time.    Psychiatric:         Mood and Affect: Mood normal.         Behavior: Behavior normal.         DIAGNOSTIC RESULTS   LABS:    Labs Reviewed   CBC WITH AUTO DIFFERENTIAL - Abnormal; Notable for the following components:       Result Value    RBC 3.81 (*)     Hemoglobin 11.5 (*)     Hematocrit 33.3 (*)     All other components within normal limits    Narrative:     Performed at:  [facility] [address], [place], [address]   Phone [phone]   COMPREHENSIVE METABOLIC PANEL - Abnormal; Notable for the following components:    Glucose 160 (*)     GFR Non- 56 (*)     ALT <5 (*)     AST 11 (*)     All other components within normal limits    Narrative:     Performed at:  [facility]  [address]   Phone [phone]   URINE RT REFLEX TO CULTURE - Abnormal; Notable for the following components:    Blood, Urine LARGE (*)     All other components within normal limits    Narrative:     Performed at:  [facility]  [address] [place] [place], [place] [employer]   Phone [phone]   LIPASE    Narrative:     Performed at:  [facility]  [address], [address]   Phone [phone]   PREGNANCY, URINE    Narrative:     Performed at:  [facility]  [address], [place] [employer]   Phone [phone]   MICROSCOPIC URINALYSIS    Narrative:     Performed at:  [facility]  [address]   Phone [phone]   TROPONIN    Narrative:     Performed at:  [facility] [address]   Phone [phone]       When ordered only abnormal lab results are displayed. All other labs were within normal range or not returned as of this dictation. EKG: When ordered, EKG's are interpreted by the Emergency Department Physician in the absence of a cardiologist.  Please see their note for interpretation of EKG. RADIOLOGY:   Non-plain film images such as CT, Ultrasound and MRI are read by the radiologist. Plain radiographic images are visualized and preliminarily interpreted by the ED Provider with the below findings:        Interpretation per the Radiologist below, if available at the time of this note:    CT ABDOMEN PELVIS W IV CONTRAST Additional Contrast? None   Final Result   1. Normal bowel pattern. No acute inflammatory abnormality of the visualized   digestive tract, noting pancolonic diverticulosis. 2. Lobular uterus, with multiple fibroids. Endometrium is not appreciated on   the study. Ultrasound would evaluate further, if clinically indicated. RECOMMENDATIONS:   Unavailable                 PROCEDURES   Unless otherwise noted below, none     Procedures    CRITICAL CARE TIME   N/A    CONSULTS:  None      EMERGENCY DEPARTMENT COURSE and DIFFERENTIAL DIAGNOSIS/MDM:   Vitals:    Vitals:    12/19/21 1232 12/19/21 1522   BP: (!) 189/102 (!) 144/78   Pulse: 96    Resp: 16    Temp: 98.1 °F (36.7 °C)    TempSrc: Oral    SpO2: 99%    Weight: 286 lb 4.8 oz (129.9 kg)    Height: 5' 7\" (1.702 m)        Patient was given the following medications:  Medications   iopamidol (ISOVUE-370) 76 % injection 75 mL (75 mLs IntraVENous Given 12/19/21 1351)           This patient presents complaining of vaginal bleeding for several days. Although she reports an epigastric burning sensation, abdomen is soft and nontender in all 4 quadrants without pulsatile mass or CVA tenderness. EKG appears stable. Troponin negative. Other blood work unremarkable. Pregnancy test negative. CT scan shows multiple fibroids in the uterus, likely causing her vaginal bleeding. She denies dizziness or lightheadedness and is not orthostatic. She is advised to follow-up with gynecologist and PCP for recheck and may return to ED per discharge instructions.     My suspicion is low for ACS, PE, myocarditis, pericarditis, endocarditis, acute pulmonary edema, pleural effusion, pericardial effusion, cardiac tamponade, cardiomyopathy, CHF exacerbation, thoracic aortic dissection, esophageal rupture, other life-threatening arrhythmia, hypertensive urgency or emergency, hemothorax, pulmonary contusion, subcutaneous emphysema, flail chest, pneumo mediastinum, rib fracture, pneumonia, pneumothorax, ARDS,  sepsis, DKA, acute surgical abdomen, obstruction, perforation, abscess, mesenteric ischemia, AAA, dissection, cholecystitis, cholangitis, pancreatitis, appendicitis, C. diff colitis, diverticulitis, volvulus, incarcerated hernia, necrotizing fasciitis, TOA, ovarian torsion, PID, ectopic pregnancy, cassandra castro Tanvir syndrome,  incarcerated hernia, Alcira gangrene, pyelonephritis, perinephric abscess, kidney stone, urosepsis, fistula, intussusception, or other concerning pathology. FINAL IMPRESSION      1. Vaginal bleeding    2.  Uterine leiomyoma, unspecified location          DISPOSITION/PLAN   DISPOSITION Decision To Discharge 12/19/2021 03:57:44 PM      PATIENT REFERRED TO:  Manuel Ortiz MD  [address]  [phone]      follow up in 1-3 days    [address]  [address]  [address] [facility]  [phone]    follow up with gynecologist in 1-3 days    [facility] Emergency Department  [address] [facility]  [phone]    If symptoms worsen      DISCHARGE MEDICATIONS:  New Prescriptions    No medications on file       DISCONTINUED MEDICATIONS:  Discontinued Medications    No medications on file              (Please note that portions of this note were completed with a voice recognition program.  Efforts were made to edit the dictations but occasionally words are mis-transcribed.)    Shaka Javier PA-C (electronically signed)           Shaka Javier PA-C  12/19/21 3584

## 2021-12-19 NOTE — ED NOTES
PIV removed, pt verbalizes understanding of discharge instructions, denies need for wheelchair, ambulated to exit with steady gait and belongings in tow.       Edward Muñiz RN  21/46/04 7879

## 2021-12-19 NOTE — ED TRIAGE NOTES
Patient arrives via triage stating she has been having vaginal bleeding for the past 4 days, going through 2-3 pads every day. Also complaining of abdominal burning. States she has had something similar in the past 5 years and she had a procedure done - unknown that that was.

## 2021-12-20 NOTE — ED PROVIDER NOTES
I independently performed a history and physical on [facility]. All diagnostic, treatment, and disposition decisions were made by myself in conjunction with the advanced practice provider. Briefly, this is a 59 y.o. female here for vaginal bleeding ongoing for the past 3 to 4 days. Patient is postmenopausal, has no vaginal bleeding since menopause. Patient also reports burning midepigastric abdominal pain ongoing for the past 2 days, overall severity mild. Nothing seems to make her symptoms any better or worse. .    On exam, patient afebrile and nontoxic. No distress. Heart RRR. Lungs CTAB. Abdomen soft, nondistended, nontender to palpation in all quadrants. EKG  EKG was reviewed by emergency department physician in the absence of a cardiologist    Narrow complex sinus rhythm, rate 69, normal axis, normal SC and QRS intervals, borderline prolonged Qtc, no ST elevations or depressions, normal t-wave morphology, impression NSR, no STEMI      Screenings            MDM    Patient afebrile and nontoxic. No distress. EKG without evidence of acute ischemia, troponin normal, ACS felt highly unlikely cause of patient's upper abdominal pain. Laboratory work-up is reassuring, normal lipase, no leukocytosis, no evidence of endorgan dysfunction or clinically significant electrolyte derangement. Urinalysis not indicative of infection. CT imaging shows no obstruction, perforation or other acute process. Uterine fibroids are noted which is likely cause of patient's abnormal uterine bleeding. On my evaluation her abdomen is completely benign to palpation. Findings were discussed with patient at length, discussed uterine fibroids is likely cause of bleeding however counseled on importance of close OB/GYN follow-up and patient verbalized her understanding. Patient felt safe for discharge to self-care with close outpatient follow-up. She is agreeable with plan and feels comfortable returning to home. Strict immediate return precautions were discussed. Patient Referrals:  Man Gary MD  [address]  [address]  [phone]      follow up in 1-3 days    [address]  [address]  [address] [facility]  [phone]    follow up with gynecologist in 1-3 days    [facility] Emergency Department  [address] [facility]  [phone]    If symptoms worsen      Discharge Medications:  Discharge Medication List as of 12/19/2021  4:10 PM          FINAL IMPRESSION  1. Vaginal bleeding    2. Uterine leiomyoma, unspecified location        Blood pressure (!) 144/78, pulse 96, temperature 98.1 °F (36.7 °C), temperature source Oral, resp. rate 16, height 5' 7\" (1.702 m), weight 286 lb 4.8 oz (129.9 kg), SpO2 99 %, not currently breastfeeding. For further details of [facility] emergency department encounter, please see documentation by advanced practice provider, MONSERRAT Carr.     Krystina Chaidez DO (electronically signed)  Attending Emergency Physician       Krystina Chaidez DO  12/20/21 3876

## 2021-12-21 LAB
EKG ATRIAL RATE: 69 BPM
EKG DIAGNOSIS: NORMAL
EKG P AXIS: 56 DEGREES
EKG P-R INTERVAL: 164 MS
EKG Q-T INTERVAL: 444 MS
EKG QRS DURATION: 100 MS
EKG QTC CALCULATION (BAZETT): 475 MS
EKG R AXIS: 16 DEGREES
EKG T AXIS: 2 DEGREES
EKG VENTRICULAR RATE: 69 BPM

## 2021-12-21 PROCEDURE — 93010 ELECTROCARDIOGRAM REPORT: CPT | Performed by: INTERNAL MEDICINE

## 2021-12-29 ENCOUNTER — TELEPHONE (OUTPATIENT)
Dept: INTERNAL MEDICINE CLINIC | Age: 61
End: 2021-12-29

## 2021-12-29 DIAGNOSIS — Z20.822 SUSPECTED COVID-19 VIRUS INFECTION: Primary | ICD-10-CM

## 2021-12-29 DIAGNOSIS — Z20.822 SUSPECTED COVID-19 VIRUS INFECTION: ICD-10-CM

## 2021-12-30 DIAGNOSIS — U07.1 COVID-19 VIRUS INFECTION: Primary | ICD-10-CM

## 2021-12-30 DIAGNOSIS — I10 ESSENTIAL HYPERTENSION: ICD-10-CM

## 2021-12-30 DIAGNOSIS — Z79.4 TYPE 2 DIABETES MELLITUS WITH DIABETIC NEUROPATHY, WITH LONG-TERM CURRENT USE OF INSULIN (HCC): ICD-10-CM

## 2021-12-30 DIAGNOSIS — E11.40 TYPE 2 DIABETES MELLITUS WITH DIABETIC NEUROPATHY, WITH LONG-TERM CURRENT USE OF INSULIN (HCC): ICD-10-CM

## 2021-12-30 DIAGNOSIS — I25.118 CORONARY ARTERY DISEASE OF NATIVE HEART WITH STABLE ANGINA PECTORIS, UNSPECIFIED VESSEL OR LESION TYPE (HCC): ICD-10-CM

## 2021-12-30 LAB — SARS-COV-2: DETECTED

## 2022-01-03 ENCOUNTER — HOSPITAL ENCOUNTER (OUTPATIENT)
Dept: ONCOLOGY | Age: 62
Setting detail: INFUSION SERIES
Discharge: HOME | End: 2022-01-03

## 2022-01-03 VITALS
TEMPERATURE: 97.2 F | SYSTOLIC BLOOD PRESSURE: 131 MMHG | HEART RATE: 84 BPM | RESPIRATION RATE: 18 BRPM | DIASTOLIC BLOOD PRESSURE: 71 MMHG | OXYGEN SATURATION: 97 %

## 2022-01-03 PROCEDURE — 99211 OFF/OP EST MAY X REQ PHY/QHP: CPT

## 2022-01-03 PROCEDURE — 2580000003 HC RX 258: Performed by: INTERNAL MEDICINE

## 2022-01-03 PROCEDURE — 6360000002 HC RX W HCPCS: Performed by: INTERNAL MEDICINE

## 2022-01-03 PROCEDURE — 96365 THER/PROPH/DIAG IV INF INIT: CPT

## 2022-01-03 PROCEDURE — G0463 HOSPITAL OUTPT CLINIC VISIT: HCPCS

## 2022-01-03 PROCEDURE — M0245 HC IV INFUSION BAMLANIVIMAB & ETESEVIMAB W/MONITORING: HCPCS

## 2022-01-03 RX ORDER — SODIUM CHLORIDE 0.9 % (FLUSH) 0.9 %
5-40 SYRINGE (ML) INJECTION PRN
Status: DISCONTINUED | OUTPATIENT
Start: 2022-01-03 | End: 2022-01-04 | Stop reason: HOSPADM

## 2022-01-03 RX ORDER — SODIUM CHLORIDE 0.9 % (FLUSH) 0.9 %
5-40 SYRINGE (ML) INJECTION EVERY 12 HOURS SCHEDULED
Status: DISCONTINUED | OUTPATIENT
Start: 2022-01-03 | End: 2022-01-04 | Stop reason: HOSPADM

## 2022-01-03 RX ORDER — ONDANSETRON 2 MG/ML
8 INJECTION INTRAMUSCULAR; INTRAVENOUS
Status: ACTIVE | OUTPATIENT
Start: 2022-01-03 | End: 2022-01-03

## 2022-01-03 RX ORDER — SODIUM CHLORIDE 9 MG/ML
100 INJECTION, SOLUTION INTRAVENOUS CONTINUOUS PRN
Status: DISCONTINUED | OUTPATIENT
Start: 2022-01-03 | End: 2022-01-04 | Stop reason: HOSPADM

## 2022-01-03 RX ORDER — SODIUM CHLORIDE 9 MG/ML
25 INJECTION, SOLUTION INTRAVENOUS PRN
Status: DISCONTINUED | OUTPATIENT
Start: 2022-01-03 | End: 2022-01-04 | Stop reason: HOSPADM

## 2022-01-03 RX ORDER — DIPHENHYDRAMINE HYDROCHLORIDE 50 MG/ML
50 INJECTION INTRAMUSCULAR; INTRAVENOUS
Status: ACTIVE | OUTPATIENT
Start: 2022-01-03 | End: 2022-01-03

## 2022-01-03 RX ORDER — SODIUM CHLORIDE 9 MG/ML
20 INJECTION, SOLUTION INTRAVENOUS CONTINUOUS PRN
Status: ACTIVE | OUTPATIENT
Start: 2022-01-03 | End: 2022-01-03

## 2022-01-03 RX ORDER — ALBUTEROL SULFATE 90 UG/1
4 AEROSOL, METERED RESPIRATORY (INHALATION) PRN
Status: DISCONTINUED | OUTPATIENT
Start: 2022-01-03 | End: 2022-01-04 | Stop reason: HOSPADM

## 2022-01-03 RX ORDER — METHYLPREDNISOLONE SODIUM SUCCINATE 125 MG/2ML
125 INJECTION, POWDER, LYOPHILIZED, FOR SOLUTION INTRAMUSCULAR; INTRAVENOUS
Status: ACTIVE | OUTPATIENT
Start: 2022-01-03 | End: 2022-01-03

## 2022-01-03 RX ORDER — ACETAMINOPHEN 325 MG/1
650 TABLET ORAL
Status: ACTIVE | OUTPATIENT
Start: 2022-01-03 | End: 2022-01-03

## 2022-01-03 RX ADMIN — SODIUM CHLORIDE 20 ML/HR: 9 INJECTION, SOLUTION INTRAVENOUS at 13:28

## 2022-01-03 RX ADMIN — Medication 2100 MG: at 13:29

## 2022-01-03 NOTE — PROGRESS NOTES
Pt to Dept for Bamlanivimab/etesevimab infusion. AVS printed and reviewed. Fact sheet provided. Pt informed that Fayeimitta Beery is an unapproved drug that is authorized for use under this Emergency use authorization. Pt verbalizes understanding. Pt jyoti infusion with no adverse reaction noted and monitored 1 hour post infusion.  Pt discharged home and to follow up with MD.

## 2022-01-14 ENCOUNTER — OFFICE VISIT (OUTPATIENT)
Dept: GYNECOLOGY | Age: 62
End: 2022-01-14
Payer: COMMERCIAL

## 2022-01-14 VITALS
WEIGHT: 282.8 LBS | BODY MASS INDEX: 44.29 KG/M2 | HEART RATE: 78 BPM | DIASTOLIC BLOOD PRESSURE: 79 MMHG | SYSTOLIC BLOOD PRESSURE: 119 MMHG | TEMPERATURE: 97.8 F

## 2022-01-14 DIAGNOSIS — N95.0 POSTMENOPAUSAL BLEEDING: ICD-10-CM

## 2022-01-14 DIAGNOSIS — Z01.419 WELL WOMAN EXAM WITH ROUTINE GYNECOLOGICAL EXAM: Primary | ICD-10-CM

## 2022-01-14 PROCEDURE — 99386 PREV VISIT NEW AGE 40-64: CPT | Performed by: OBSTETRICS & GYNECOLOGY

## 2022-01-14 NOTE — PROGRESS NOTES
Subjective:      Patient ID: Terence Saravia is a 59 y.o. female. HPI  pts here as a referral from the ER. She was seen for several days of vag bleeding. She had a similar experience 5 years ago. Completed menopause over 10 years ago. Due for pap. Not currently bleeding. Just started a new nursing job. Review of Systems Pertinent review of systems items discussed above. All others systems items not discussed above were negative. Objective:   Physical Exam  Constitutional:       Appearance: She is well-developed. HENT:      Head: Normocephalic and atraumatic. Neck:      Thyroid: No thyromegaly. Trachea: No tracheal deviation. Cardiovascular:      Rate and Rhythm: Normal rate and regular rhythm. Heart sounds: Normal heart sounds. No murmur heard. Pulmonary:      Effort: Pulmonary effort is normal. No respiratory distress. Breath sounds: Normal breath sounds. No wheezing or rales. Chest:   Breasts:      Right: No mass, nipple discharge or skin change. Left: No mass, nipple discharge or skin change. Abdominal:      General: There is no distension. Palpations: Abdomen is soft. There is no mass. Tenderness: There is no abdominal tenderness. There is no rebound. Genitourinary:     Labia:         Right: No lesion. Left: No lesion. Vagina: Normal. No foreign body. No vaginal discharge. Cervix: No cervical motion tenderness, discharge or friability. Uterus: Not deviated, not enlarged, not fixed and not tender. Adnexa:         Right: No mass or tenderness. Left: No mass or tenderness. Rectum: Normal. Guaiac result negative. No mass or external hemorrhoid. Comments: Pap performed. Musculoskeletal:         General: Normal range of motion. Lymphadenopathy:      Cervical: No cervical adenopathy. Neurological:      Mental Status: She is alert and oriented to person, place, and time.      Procedure note  Pre op:  Post men bleeding  Post op:  Same  Procedure: endometrial biopsy  Details:  Using sterile technique, the anterior aspect of the cervix was grasped with a single tooth tenaculum. The uterus was sounded to 7 cm. The pipelle was inserted and the plunger pulled back. Adequate tissue retrieved. The process was repeated an additional time. The patient tolerated the procedure well. I will call her when the results become available to discuss the follow up plan. Assessment:   Normal gyn exam, postmen bleeding     Plan:   Pelvic US. Call with results. I had wanted to have pt come in for appt but since she started a new job she preferred to be given the results over the phone.          Shaina Perkins MD

## 2022-02-16 ENCOUNTER — TELEPHONE (OUTPATIENT)
Dept: INTERNAL MEDICINE CLINIC | Age: 62
End: 2022-02-16

## 2022-02-16 ENCOUNTER — TELEMEDICINE (OUTPATIENT)
Dept: INTERNAL MEDICINE CLINIC | Age: 62
End: 2022-02-16

## 2022-02-16 DIAGNOSIS — E11.9 TYPE 2 DIABETES MELLITUS WITHOUT COMPLICATION, WITH LONG-TERM CURRENT USE OF INSULIN (HCC): ICD-10-CM

## 2022-02-16 DIAGNOSIS — Z79.4 TYPE 2 DIABETES MELLITUS WITH DIABETIC NEUROPATHY, WITH LONG-TERM CURRENT USE OF INSULIN (HCC): ICD-10-CM

## 2022-02-16 DIAGNOSIS — Z79.4 TYPE 2 DIABETES MELLITUS WITHOUT COMPLICATION, WITH LONG-TERM CURRENT USE OF INSULIN (HCC): ICD-10-CM

## 2022-02-16 DIAGNOSIS — E11.40 TYPE 2 DIABETES MELLITUS WITH DIABETIC NEUROPATHY, WITH LONG-TERM CURRENT USE OF INSULIN (HCC): ICD-10-CM

## 2022-02-16 DIAGNOSIS — I10 ESSENTIAL HYPERTENSION: ICD-10-CM

## 2022-02-16 DIAGNOSIS — E78.01 FAMILIAL HYPERCHOLESTEROLEMIA: Primary | ICD-10-CM

## 2022-02-16 DIAGNOSIS — F33.42 RECURRENT MAJOR DEPRESSIVE DISORDER, IN FULL REMISSION (HCC): ICD-10-CM

## 2022-02-16 PROCEDURE — 99214 OFFICE O/P EST MOD 30 MIN: CPT | Performed by: INTERNAL MEDICINE

## 2022-02-16 RX ORDER — LISINOPRIL AND HYDROCHLOROTHIAZIDE 20; 12.5 MG/1; MG/1
TABLET ORAL
Qty: 60 TABLET | Refills: 5 | Status: SHIPPED | OUTPATIENT
Start: 2022-02-16 | End: 2022-10-21 | Stop reason: SDUPTHER

## 2022-02-16 RX ORDER — BLOOD-GLUCOSE METER
1 KIT MISCELLANEOUS DAILY
Qty: 1 KIT | Refills: 0 | Status: SHIPPED | OUTPATIENT
Start: 2022-02-16 | End: 2022-02-16 | Stop reason: SDUPTHER

## 2022-02-16 RX ORDER — BLOOD-GLUCOSE METER
1 KIT MISCELLANEOUS DAILY
Qty: 1 KIT | Refills: 0 | Status: SHIPPED | OUTPATIENT
Start: 2022-02-16

## 2022-02-16 RX ORDER — INSULIN LISPRO 200 [IU]/ML
10 INJECTION, SOLUTION SUBCUTANEOUS 3 TIMES DAILY PRN
Qty: 5 PEN | Refills: 2 | Status: SHIPPED | OUTPATIENT
Start: 2022-02-16 | End: 2022-10-28 | Stop reason: SDUPTHER

## 2022-02-16 RX ORDER — INSULIN LISPRO 200 [IU]/ML
10 INJECTION, SOLUTION SUBCUTANEOUS 3 TIMES DAILY PRN
Qty: 5 PEN | Refills: 2 | Status: SHIPPED | OUTPATIENT
Start: 2022-02-16 | End: 2022-02-16 | Stop reason: SDUPTHER

## 2022-02-16 RX ORDER — BLOOD-GLUCOSE METER
1 KIT MISCELLANEOUS 3 TIMES DAILY
Qty: 100 EACH | Refills: 5 | Status: SHIPPED | OUTPATIENT
Start: 2022-02-16 | End: 2022-10-28 | Stop reason: SDUPTHER

## 2022-02-16 RX ORDER — BLOOD-GLUCOSE METER
1 KIT MISCELLANEOUS 3 TIMES DAILY
Qty: 100 EACH | Refills: 5 | Status: SHIPPED | OUTPATIENT
Start: 2022-02-16 | End: 2022-02-16 | Stop reason: SDUPTHER

## 2022-02-16 RX ORDER — LISINOPRIL AND HYDROCHLOROTHIAZIDE 20; 12.5 MG/1; MG/1
TABLET ORAL
Qty: 60 TABLET | Refills: 5 | Status: SHIPPED | OUTPATIENT
Start: 2022-02-16 | End: 2022-02-16 | Stop reason: SDUPTHER

## 2022-02-16 RX ORDER — INSULIN GLARGINE 100 [IU]/ML
60 INJECTION, SOLUTION SUBCUTANEOUS NIGHTLY
Qty: 15 PEN | Refills: 2 | Status: SHIPPED | OUTPATIENT
Start: 2022-02-16 | End: 2022-04-08 | Stop reason: SDUPTHER

## 2022-02-16 SDOH — ECONOMIC STABILITY: FOOD INSECURITY: WITHIN THE PAST 12 MONTHS, YOU WORRIED THAT YOUR FOOD WOULD RUN OUT BEFORE YOU GOT MONEY TO BUY MORE.: NEVER TRUE

## 2022-02-16 SDOH — ECONOMIC STABILITY: FOOD INSECURITY: WITHIN THE PAST 12 MONTHS, THE FOOD YOU BOUGHT JUST DIDN'T LAST AND YOU DIDN'T HAVE MONEY TO GET MORE.: NEVER TRUE

## 2022-02-16 ASSESSMENT — PATIENT HEALTH QUESTIONNAIRE - PHQ9
10. IF YOU CHECKED OFF ANY PROBLEMS, HOW DIFFICULT HAVE THESE PROBLEMS MADE IT FOR YOU TO DO YOUR WORK, TAKE CARE OF THINGS AT HOME, OR GET ALONG WITH OTHER PEOPLE: 0
3. TROUBLE FALLING OR STAYING ASLEEP: 3
4. FEELING TIRED OR HAVING LITTLE ENERGY: 1
SUM OF ALL RESPONSES TO PHQ QUESTIONS 1-9: 4
2. FEELING DOWN, DEPRESSED OR HOPELESS: 0
SUM OF ALL RESPONSES TO PHQ QUESTIONS 1-9: 4
6. FEELING BAD ABOUT YOURSELF - OR THAT YOU ARE A FAILURE OR HAVE LET YOURSELF OR YOUR FAMILY DOWN: 0
SUM OF ALL RESPONSES TO PHQ QUESTIONS 1-9: 4
5. POOR APPETITE OR OVEREATING: 0
9. THOUGHTS THAT YOU WOULD BE BETTER OFF DEAD, OR OF HURTING YOURSELF: 0
7. TROUBLE CONCENTRATING ON THINGS, SUCH AS READING THE NEWSPAPER OR WATCHING TELEVISION: 0
8. MOVING OR SPEAKING SO SLOWLY THAT OTHER PEOPLE COULD HAVE NOTICED. OR THE OPPOSITE, BEING SO FIGETY OR RESTLESS THAT YOU HAVE BEEN MOVING AROUND A LOT MORE THAN USUAL: 0
SUM OF ALL RESPONSES TO PHQ QUESTIONS 1-9: 4

## 2022-02-16 ASSESSMENT — ENCOUNTER SYMPTOMS
VOMITING: 0
ABDOMINAL PAIN: 0
WHEEZING: 0
SHORTNESS OF BREATH: 0
PHOTOPHOBIA: 0
NAUSEA: 0

## 2022-02-16 ASSESSMENT — SOCIAL DETERMINANTS OF HEALTH (SDOH): HOW HARD IS IT FOR YOU TO PAY FOR THE VERY BASICS LIKE FOOD, HOUSING, MEDICAL CARE, AND HEATING?: NOT HARD AT ALL

## 2022-02-16 NOTE — TELEPHONE ENCOUNTER
Patient expressed concern that she cannot afford Lantus. Patient asked about getting the medication free through a program that she has used in the past. Patient was given information for [facility] Patient Connection to call and see if she will be eligible again for assistance.  Patient encouraged to please call the office with any other concerns or [facility] to assist her with the program.

## 2022-02-16 NOTE — TELEPHONE ENCOUNTER
Annalee Horta with [facility] states they received several prescriptions from Dr Teresa Spurling for this patient. She states they only fill prescriptions for employees and patient's being discharged from hospital.  She does not show filling prescriptions for patient there in the past.  She was calling to see if prescriptions were meant to be sent there or if they should go to another pharmacy.

## 2022-02-16 NOTE — PROGRESS NOTES
2022    TELEHEALTH EVALUATION -- Audio/Visual (During [id number] public health emergency)    HPI:    Kayla Alnozo (:  1960) has requested an audio/video evaluation for the following concern(s):    Follow-up visit for chronic problems. Patient has been taking Lantus variable dose from 50 to 60 units. She continues to take sliding scale Humalog. She is currently back to [facility] again  Patient could not give me blood sugar readings today. She is not up-to-date on diabetic eye exam.  She is advised to make diabetic eye exam.    History of coronary artery disease hypertension hyperlipidemia  Patient cannot recall the last time use nitroglycerin. Denies chest pain palpitation dizziness. She is again advised to make appointment with cardiologist for history of abnormal stress test.    History of depression. She is no longer taking Cymbalta. She does not feel she is depressed anymore. Review of Systems   Constitutional: Negative for diaphoresis and unexpected weight change. Eyes: Negative for photophobia and visual disturbance. Respiratory: Negative for shortness of breath and wheezing. Cardiovascular: Negative for chest pain and palpitations. Gastrointestinal: Negative for abdominal pain, nausea and vomiting. Neurological: Negative for dizziness and light-headedness. Prior to Visit Medications    Medication Sig Taking?  Authorizing Provider   insulin glargine (LANTUS SOLOSTAR) 100 UNIT/ML injection pen Inject 60 Units into the skin nightly Yes Danae Rojas MD   UNABLE TO FIND sodium chloride 0.9 % SOLN 100 mL with bamlanivimab 700 MG/20ML SOLN 700 mg, etesevimab 700 MG/20ML SOLN 1,400 mg Yes Danae Rojas MD   amLODIPine (NORVASC) 10 MG tablet Take 1 tablet by mouth daily Yes Oneal Albright MD   metoprolol tartrate (LOPRESSOR) 50 MG tablet TAKE 1 TABLET BY MOUTH TWICE A DAY Yes Oneal Albright MD   nitroGLYCERIN (NITRODUR) 0.4 MG/HR APPLY 1 PATCH ONTO THE SKIN EVERY DAY Yes Shan Tucker MD   Insulin Pen Needle 32G X 4 MM MISC Use to give LA and SA insulin 5 x daily Yes Michelle Cabrera MD   Blood Pressure KIT Use as directed Yes Michelle Cabrera MD   nitroGLYCERIN (NITROSTAT) 0.4 MG SL tablet PLACE 1 TABLET UNDER TONGUE EVERY 5 MINUTES AS NEEDED FOR CHEST PAIN. MAX OF 3 DOSES Yes Shan Tucker MD   meloxicam (MOBIC) 15 MG tablet Take 15 mg by mouth Yes Historical Provider, MD   UNABLE TO FIND GLUCOSE TEST STRIPS, TESTING TID, ON INSULIN, E11.9 #300  DAYS  DISPENSE STRIPS BEST COVERED BY INSURANCE Yes ANNE-MARIE Bird MD   albuterol sulfate HFA (PROAIR HFA) 108 (90 Base) MCG/ACT inhaler Inhale 2 puffs into the lungs every 6 hours as needed for Wheezing Yes Michelle Cabrera MD   glucagon 1 MG injection Inject 1 mg into the skin See Admin Instructions Follow package directions for low blood sugar. Yes Michelle Cabrera MD   omeprazole (PRILOSEC) 20 MG delayed release capsule Take 1 capsule by mouth 2 times daily  Patient taking differently: Take 20 mg by mouth 2 times daily as needed  Yes Michelle Cabrera MD   aspirin 81 MG chewable tablet TAKE 1 TABLET BY MOUTH DAILY  Patient taking differently: Take 81 mg by mouth daily Report taking 325mg Yes Michelle Cabrera MD   oxyCODONE-acetaminophen (PERCOCET) 7.5-325 MG per tablet Take 1 tablet by mouth 4 times daily . Yes Historical Provider, MD   UNABLE TO FIND Glucometer #1, E11.9, testing TID. ON INSULIN. Home pt.   DISPENSE METER BEST COVERED BY INSURANCE Yes Michelle Cabrera MD   Lancets MISC #300  DAYS, TESTING TID, E11.9 Yes Michelle Cabrera MD   Lancet Devices (ACCU-CHEK SOFTCLIX LANCET DEV) MISC 1 Units by Does not apply route 4 times daily (before meals and nightly) Yes Becki Alvarado MD   SOFT TOUCH LANCETS MISC 1 strip by Does not apply route 4 times daily Yes Becki Alvarado MD   lisinopril-hydroCHLOROthiazide (PRINZIDE;ZESTORETIC) 20-12.5 MG per tablet TAKE 1 TABLET BY [address]  M Cecile Elizabeth MD   insulin lispro (HUMALOG KWIKPEN) 200 UNIT/ML SOPN pen Inject 10 Units into the skin 3 times daily as needed for High Blood Sugar (please continue your sliding scale as previously defined by your doctor)  Cristopher Emery MD   Dulaglutide 1.5 MG/0.5ML SOPN INJECT 1.5 MG INTO THE SKIN ONCE A WEEK INDICATIONS: DIABETES  M Cecile Elizabeth MD   blood glucose test strips (FREESTYLE LITE) strip 1 each by In Vitro route 3 times daily  Cristopher Emery MD   glucose monitoring (FREESTYLE) kit 1 kit by Does not apply route daily  Cristopher Emery MD       Social History     Tobacco Use    Smoking status: Never Smoker    Smokeless tobacco: Never Used   Substance Use Topics    Alcohol use: Yes     Alcohol/week: 0.0 standard drinks     Comment: Wine nightly    Drug use: No        Allergies   Allergen Reactions    Benadryl [Diphenhydramine Hcl] Other (See Comments)     Jittery feeling    Zolpidem Tartrate [Zolpidem Tartrate Er] Other (See Comments)     Jittery feeling    Diphenhydramine Nausea And Vomiting     Jittery feeling   ,   Past Medical History:   Diagnosis Date    Anxiety     Arthritis     Asthma     as child    Bilateral chronic knee pain     sees pain management    Degenerative disc disease, cervical MRI 2011    Multilevel cervical degenerative this disease most significant at the C7-T1 level where there is probable impingement of the exiting right C8 nerve root caused by disc herniation.     Depression     Diabetes     Nelson Tierney, NP at [facility]    High blood pressure     Hypertriglyceridemia     Lumbar herniated disc MRI 2011    L5-S1    Morbid obesity ([address]     Opiate dependence ([address]     Osteomyelitis (HCC)     Pneumonia     as child   ,   Past Surgical History:   Procedure Laterality Date    CARDIAC CATHETERIZATION  11/2017    1.  Diffusely diseased distal LAD.  Distal LAD has a 50% narrowing in two ---   ,   Social History     Tobacco Use    Smoking status: Never Smoker    Smokeless tobacco: Never Used   Substance Use Topics    Alcohol use: Yes     Alcohol/week: 0.0 standard drinks     Comment: Wine nightly    Drug use: No   ,   Family History   Problem Relation Age of Onset    Lung Cancer Mother 48        + tob    Osteoarthritis Other     Cancer Other     Diabetes Other     Hypertension Other     Stroke Other     Heart Disease Other     Diabetes Maternal Grandmother    ,   Immunization History   Administered Date(s) Administered    Influenza, Quadv, IM, (6 mo and older Fluzone, Flulaval, Fluarix and 3 yrs and older Afluria) 11/02/2016    Pneumococcal Polysaccharide ([id number]) 11/02/2016       PHYSICAL EXAMINATION:  [ INSTRUCTIONS:  \"[x]\" Indicates a positive item  \"[]\" Indicates a negative item  -- DELETE ALL ITEMS NOT EXAMINED]  Vital Signs: (As obtained by patient/caregiver or practitioner observation)    Blood pressure-slightly elevated today. She is not taking her lisinopril hydrochlorothiazide recently  heart rate-    Respiratory rate-    Temperature-  Pulse oximetry-     Constitutional: [x] Appears well-developed and well-nourished [x] No apparent distress      [] Abnormal-   Mental status  [x] Alert and awake  [x] Oriented to person/place/time [x]Able to follow commands      Eyes:  EOM    []  Normal  [] Abnormal-  Sclera  [x]  Normal  [] Abnormal -         Discharge [x]  None visible  [] Abnormal -    HENT:   [] Normocephalic, atraumatic.   [] Abnormal   [] Mouth/Throat: Mucous membranes are moist.     External Ears [] Normal  [] Abnormal-     Neck: [] No visualized mass     Pulmonary/Chest: [x] Respiratory effort normal.  [x] No visualized signs of difficulty breathing or respiratory distress        [] Abnormal-      Musculoskeletal:   [] Normal gait with no signs of ataxia         [] Normal range of motion of neck        [] Abnormal-       Neurological:        [x] No Facial Asymmetry (Cranial nerve 7 motor function) (limited exam to video visit)          [] No gaze palsy        [] Abnormal-         Skin:        [] No significant exanthematous lesions or discoloration noted on facial skin         [] Abnormal-            Psychiatric:       [x] Normal Affect [] No Hallucinations        [] Abnormal-     Other pertinent observable physical exam findings-     ASSESSMENT/PLAN:    1. Essential hypertension  Uncontrolled. She did not have her blood pressure medicine today. - lisinopril-hydroCHLOROthiazide (PRINZIDE;ZESTORETIC) 20-12.5 MG per tablet; TAKE 1 TABLET BY MOUTH EVERY DAY  Dispense: 60 tablet; Refill: 5  - Basic Metabolic Panel; Future  She will continue metoprolol as well as amlodipine  2. Type 2 diabetes mellitus with diabetic neuropathy, with long-term current use of insulin (HCC)    - insulin lispro (HUMALOG KWIKPEN) 200 UNIT/ML SOPN pen; Inject 10 Units into the skin 3 times daily as needed for High Blood Sugar (please continue your sliding scale as previously defined by your doctor)  Dispense: 5 pen; Refill: 2  - Dulaglutide 1.5 MG/0.5ML SOPN; INJECT 1.5 MG INTO THE SKIN ONCE A WEEK INDICATIONS: DIABETES  Dispense: 4 pen; Refill: 5  - blood glucose test strips (FREESTYLE LITE) strip; 1 each by In Vitro route 3 times daily  Dispense: 100 each; Refill: 5  - Hemoglobin A1C; Future  - Lipid, Fasting; Future  - MICROALBUMIN / CREATININE URINE RATIO; Future      3. Familial hypercholesterolemia  Continue atorvastatin. Diet lifestyle changes. Pending lipid profile. History of recurrent depression. Currently not on any medication. No concerning symptoms. Depression is in full remission without any medicine. Return in about 3 months (around 5/16/2022), or if symptoms worsen or fail to improve, for need lab before next visit. Rometta Favre Bertrand Edelson, was evaluated through a synchronous (real-time) audio-video encounter. The patient (or guardian if applicable) is aware that this is a billable service, which includes applicable co-pays.  This Virtual Visit was conducted with patient's (and/or legal guardian's) consent. The visit was conducted pursuant to the emergency declaration under the [address] [place] [address] and the [employer] Act. Patient identification was verified, and a caregiver was present when appropriate. The patient was located at home in a state where the provider was licensed to provide care. --Harry Trevino MD on 2/16/2022 at 12:57 PM    An electronic signature was used to authenticate this note.

## 2022-03-14 ENCOUNTER — TELEPHONE (OUTPATIENT)
Dept: INTERNAL MEDICINE CLINIC | Age: 62
End: 2022-03-14

## 2022-03-14 NOTE — TELEPHONE ENCOUNTER
----- Message from Forrest Kelly sent at 3/14/2022  3:22 PM EDT -----  Subject: Message to Provider    QUESTIONS  Information for Provider? PT NEEDS LABWORK , TOLD HER TO GET ASAP,HER   INSURANCE STARTS APRIL 1 CAN PT WAIT TIL THAN TO GET LABS DONE ? WOULD   THAT BE OKAY WITH THE DR   ---------------------------------------------------------------------------  --------------  CALL BACK INFO  What is the best way for the office to contact you? OK to leave message on   voicemail  Preferred Call Back Phone Number? [phone]  ---------------------------------------------------------------------------  --------------  SCRIPT ANSWERS  Relationship to Patient?  Self

## 2022-04-08 RX ORDER — INSULIN GLARGINE 100 [IU]/ML
60 INJECTION, SOLUTION SUBCUTANEOUS NIGHTLY
Qty: 15 PEN | Refills: 2 | Status: SHIPPED | OUTPATIENT
Start: 2022-04-08 | End: 2022-07-27

## 2022-05-18 ENCOUNTER — TELEPHONE (OUTPATIENT)
Dept: INTERNAL MEDICINE CLINIC | Age: 62
End: 2022-05-18

## 2022-05-18 DIAGNOSIS — I10 ESSENTIAL HYPERTENSION: ICD-10-CM

## 2022-05-18 DIAGNOSIS — R00.2 PALPITATION: ICD-10-CM

## 2022-05-18 DIAGNOSIS — I25.118 CORONARY ARTERY DISEASE OF NATIVE HEART WITH STABLE ANGINA PECTORIS, UNSPECIFIED VESSEL OR LESION TYPE (HCC): ICD-10-CM

## 2022-05-18 RX ORDER — AMLODIPINE BESYLATE 10 MG/1
10 TABLET ORAL DAILY
Qty: 60 TABLET | Refills: 0 | Status: SHIPPED | OUTPATIENT
Start: 2022-05-18 | End: 2022-06-10

## 2022-05-18 RX ORDER — NITROGLYCERIN 0.4 MG/1
TABLET SUBLINGUAL
Qty: 25 TABLET | Refills: 3 | Status: SHIPPED | OUTPATIENT
Start: 2022-05-18 | End: 2022-10-28 | Stop reason: SDUPTHER

## 2022-05-18 RX ORDER — NITROGLYCERIN 80 MG/1
1 PATCH TRANSDERMAL DAILY
Qty: 30 PATCH | Refills: 3 | Status: SHIPPED | OUTPATIENT
Start: 2022-05-18 | End: 2022-10-28 | Stop reason: SDUPTHER

## 2022-05-18 RX ORDER — METOPROLOL TARTRATE 50 MG/1
TABLET, FILM COATED ORAL
Qty: 60 TABLET | Refills: 0 | Status: SHIPPED | OUTPATIENT
Start: 2022-05-18 | End: 2022-06-10

## 2022-05-18 NOTE — TELEPHONE ENCOUNTER
----- Message from [facility] sent at 5/18/2022  9:15 AM EDT -----  Subject: Refill Request    QUESTIONS  Name of Medication? metoprolol tartrate (LOPRESSOR) 50 MG tablet  Patient-reported dosage and instructions? takes once daily   How many days do you have left? 0  Preferred Pharmacy? [facility] #[id number]  Pharmacy phone number (if available)? [phone]  ---------------------------------------------------------------------------  --------------,  Name of Medication? amLODIPine (NORVASC) 10 MG tablet  Patient-reported dosage and instructions? takes once daily   How many days do you have left? 0  Preferred Pharmacy? [facility]ACY #[id number]  Pharmacy phone number (if available)? [phone]  ---------------------------------------------------------------------------  --------------,  Name of Medication? nitroGLYCERIN (NITRODUR) 0.4 MG/HR  Patient-reported dosage and instructions? applies one daily   How many days do you have left? 3  Preferred Pharmacy? [facility]ACY #[id number]  Pharmacy phone number (if available)? [phone]  ---------------------------------------------------------------------------  --------------,  Name of Medication? nitroGLYCERIN (NITROSTAT) 0.4 MG SL tablet  Patient-reported dosage and instructions? takes as needed   How many days do you have left? 2  Preferred Pharmacy? [id number]  Pharmacy phone number (if available)? [phone]  ---------------------------------------------------------------------------  --------------  Reynaldo SCALES  What is the best way for the office to contact you? OK to leave message on   voicemail  Preferred Call Back Phone Number? [phone]  ---------------------------------------------------------------------------  --------------  SCRIPT ANSWERS  Relationship to Patient?  Self

## 2022-06-09 DIAGNOSIS — R00.2 PALPITATION: ICD-10-CM

## 2022-06-09 DIAGNOSIS — I25.118 CORONARY ARTERY DISEASE OF NATIVE HEART WITH STABLE ANGINA PECTORIS, UNSPECIFIED VESSEL OR LESION TYPE (HCC): ICD-10-CM

## 2022-06-09 DIAGNOSIS — I10 ESSENTIAL HYPERTENSION: ICD-10-CM

## 2022-06-10 DIAGNOSIS — E11.40 TYPE 2 DIABETES MELLITUS WITH DIABETIC NEUROPATHY, WITH LONG-TERM CURRENT USE OF INSULIN (HCC): ICD-10-CM

## 2022-06-10 DIAGNOSIS — I10 ESSENTIAL HYPERTENSION: ICD-10-CM

## 2022-06-10 DIAGNOSIS — Z79.4 TYPE 2 DIABETES MELLITUS WITH DIABETIC NEUROPATHY, WITH LONG-TERM CURRENT USE OF INSULIN (HCC): ICD-10-CM

## 2022-06-10 LAB
ANION GAP SERPL CALCULATED.3IONS-SCNC: 13 MMOL/L (ref 3–16)
BUN BLDV-MCNC: 14 MG/DL (ref 7–20)
CALCIUM SERPL-MCNC: 9.5 MG/DL (ref 8.3–10.6)
CHLORIDE BLD-SCNC: 105 MMOL/L (ref 99–110)
CHOLESTEROL, FASTING: 179 MG/DL (ref 0–199)
CO2: 25 MMOL/L (ref 21–32)
CREAT SERPL-MCNC: 0.9 MG/DL (ref 0.6–1.2)
CREATININE URINE: 107.1 MG/DL (ref 28–259)
GFR AFRICAN AMERICAN: >60
GFR NON-AFRICAN AMERICAN: >60
GLUCOSE BLD-MCNC: 47 MG/DL (ref 70–99)
HDLC SERPL-MCNC: 77 MG/DL (ref 40–60)
LDL CHOLESTEROL CALCULATED: 81 MG/DL
MICROALBUMIN UR-MCNC: <1.2 MG/DL
MICROALBUMIN/CREAT UR-RTO: NORMAL MG/G (ref 0–30)
POTASSIUM SERPL-SCNC: 4 MMOL/L (ref 3.5–5.1)
SODIUM BLD-SCNC: 143 MMOL/L (ref 136–145)
TRIGLYCERIDE, FASTING: 105 MG/DL (ref 0–150)
VLDLC SERPL CALC-MCNC: 21 MG/DL

## 2022-06-10 RX ORDER — AMLODIPINE BESYLATE 10 MG/1
TABLET ORAL
Qty: 30 TABLET | Refills: 0 | Status: SHIPPED | OUTPATIENT
Start: 2022-06-10 | End: 2022-07-05

## 2022-06-10 RX ORDER — METOPROLOL TARTRATE 50 MG/1
TABLET, FILM COATED ORAL
Qty: 60 TABLET | Refills: 0 | Status: SHIPPED | OUTPATIENT
Start: 2022-06-10 | End: 2022-07-05

## 2022-06-11 LAB
ESTIMATED AVERAGE GLUCOSE: 182.9 MG/DL
HBA1C MFR BLD: 8 %

## 2022-06-12 NOTE — RESULT ENCOUNTER NOTE
Patient was called for low blood glucose. Beatties control could be better. Hypoglycemia symptoms and management discussed.   In addition to current medications patient will be enrolled in [facility] flex program for better control of diabetes

## 2022-07-04 DIAGNOSIS — R00.2 PALPITATION: ICD-10-CM

## 2022-07-04 DIAGNOSIS — I10 ESSENTIAL HYPERTENSION: ICD-10-CM

## 2022-07-04 DIAGNOSIS — I25.118 CORONARY ARTERY DISEASE OF NATIVE HEART WITH STABLE ANGINA PECTORIS, UNSPECIFIED VESSEL OR LESION TYPE (HCC): ICD-10-CM

## 2022-07-05 RX ORDER — AMLODIPINE BESYLATE 10 MG/1
TABLET ORAL
Qty: 30 TABLET | Refills: 0 | Status: SHIPPED | OUTPATIENT
Start: 2022-07-05 | End: 2022-08-08

## 2022-07-05 RX ORDER — METOPROLOL TARTRATE 50 MG/1
TABLET, FILM COATED ORAL
Qty: 60 TABLET | Refills: 0 | Status: SHIPPED | OUTPATIENT
Start: 2022-07-05 | End: 2022-10-21 | Stop reason: SDUPTHER

## 2022-07-25 LAB — GLUCOSE BLD-MCNC: 136 MG/DL (ref 70–100)

## 2022-07-26 ENCOUNTER — OFFICE VISIT (OUTPATIENT)
Dept: INTERNAL MEDICINE CLINIC | Age: 62
End: 2022-07-26
Payer: COMMERCIAL

## 2022-07-26 VITALS
BODY MASS INDEX: 45.99 KG/M2 | HEIGHT: 67 IN | HEART RATE: 88 BPM | WEIGHT: 293 LBS | DIASTOLIC BLOOD PRESSURE: 80 MMHG | SYSTOLIC BLOOD PRESSURE: 126 MMHG

## 2022-07-26 DIAGNOSIS — M54.50 RIGHT-SIDED LOW BACK PAIN WITHOUT SCIATICA, UNSPECIFIED CHRONICITY: ICD-10-CM

## 2022-07-26 DIAGNOSIS — R10.9 FLANK PAIN: Primary | ICD-10-CM

## 2022-07-26 DIAGNOSIS — D25.9 UTERINE LEIOMYOMA, UNSPECIFIED LOCATION: ICD-10-CM

## 2022-07-26 LAB
BILIRUBIN, POC: ABNORMAL
BLOOD URINE, POC: ABNORMAL
CLARITY, POC: CLEAR
COLOR, POC: YELLOW
GLUCOSE URINE, POC: ABNORMAL
KETONES, POC: ABNORMAL
LEUKOCYTE EST, POC: ABNORMAL
NITRITE, POC: ABNORMAL
PH, POC: 6
PROTEIN, POC: ABNORMAL
SPECIFIC GRAVITY, POC: >=1.03
UROBILINOGEN, POC: 0.2

## 2022-07-26 PROCEDURE — 1036F TOBACCO NON-USER: CPT | Performed by: FAMILY MEDICINE

## 2022-07-26 PROCEDURE — G8417 CALC BMI ABV UP PARAM F/U: HCPCS | Performed by: FAMILY MEDICINE

## 2022-07-26 PROCEDURE — 3017F COLORECTAL CA SCREEN DOC REV: CPT | Performed by: FAMILY MEDICINE

## 2022-07-26 PROCEDURE — G8427 DOCREV CUR MEDS BY ELIG CLIN: HCPCS | Performed by: FAMILY MEDICINE

## 2022-07-26 PROCEDURE — 99214 OFFICE O/P EST MOD 30 MIN: CPT | Performed by: FAMILY MEDICINE

## 2022-07-26 PROCEDURE — 81002 URINALYSIS NONAUTO W/O SCOPE: CPT | Performed by: FAMILY MEDICINE

## 2022-07-26 RX ORDER — IBUPROFEN 800 MG/1
TABLET ORAL
Qty: 90 TABLET | Refills: 0 | Status: SHIPPED | OUTPATIENT
Start: 2022-07-26 | End: 2022-08-22

## 2022-07-26 RX ORDER — OXYCODONE AND ACETAMINOPHEN 10; 325 MG/1; MG/1
1 TABLET ORAL 3 TIMES DAILY PRN
COMMUNITY

## 2022-07-26 NOTE — PROGRESS NOTES
2022    Joselyn Monge (:  1960) is a 57 y.o. female, here for evaluation of the following chief complaint(s):  Urinary Tract Infection (Has been dealing with this for 2 months. Has finished Cipro and clindamycin. )        ASSESSMENT/PLAN:    1. Flank pain  Urine is completely clear and with review of prior culture, her flank pain is NOT due to UTI and not likely a kidney stone due to lack of blood and subacute to chronic nature of pain. Since there is no palpable tenderness, consider uterine fibroid to be a possible cause. Will treat with IBU and see if improves. (Told not to take Meloxicam with this). She asked about more opiate but it appears she had 90 pills of Percocet filled by pain management on  22, 22 with MME of 46.50 mg. She said pain management would be OK with other doctor giving her more med. I told her no, not appropriate.    - POCT Urinalysis no Micro  - Culture, Urine  - ibuprofen (ADVIL;MOTRIN) 800 MG tablet; Take 1 two to three times per day regularly. Take with food or milk. Dispense: 90 tablet; Refill: 0    2. Uterine leiomyoma, unspecified location  No sizes really commented on CT scan. Gynecologist ordered US and not done. Possible large fibroid pushing on back or degenerating could give her pain. - US NON OB TRANSVAGINAL; Future    3. Right-sided low back pain without sciatica, unspecified chronicity  Will check on uterine fibroid size and if not significant or a contributor and does not get better with IBU, then may need repeat CT or an MRI.   - US NON OB TRANSVAGINAL; Future  - ibuprofen (ADVIL;MOTRIN) 800 MG tablet; Take 1 two to three times per day regularly. Take with food or milk. Dispense: 90 tablet; Refill: 0      FOLLOW UP:  Call or return to clinic prn if these symptoms worsen or fail to improve as anticipated. Encouraged to follow up with Dr. Audelia Hooker.    HPI  2 months or so noted right flank and side pain.   At first bearable but got worse and went to Urgent Care and they said she had UTI and given Cipro and Clindamycin. No improvement with antibiotic. Urine culture showed Staph Epi only. No UA results attached. Right flank and right side. Burning and hot achy pain. Sometimes it is sharp. Using pain med for her knees and it decreases the pain but does not take it away (Meloxicam -- but not taking daily and Oxycodone apap 7.5 mg). Moving makes it worse. Not sleeping well with the pain. No change with eating. Was constipated a couple of weeks ago and took Miralax and back to normal but going a little more than usual.  This did not help or change her pain. No nausea with the pain. No back injury. Can feel it with deep breath but it does not cause it to hurt.    + diabetic. A1C was 8.0. No h/o kidney stones. Per review of chart had fibroid uterus on CT scan. Gynecologist did order pelvic US but she never had it done. Reviewed with her the CT abd/pelvis from Dec and all organs x uterus looked OK.         Outpatient Medications Marked as Taking for the 7/26/22 encounter (Office Visit) with Mustapha Mckeon MD   Medication Sig Dispense Refill    metoprolol tartrate (LOPRESSOR) 50 MG tablet TAKE 1 TABLET BY MOUTH TWICE A DAY 60 tablet 0    amLODIPine (NORVASC) 10 MG tablet TAKE 1 TABLET BY MOUTH EVERY DAY 30 tablet 0    insulin glargine (LANTUS SOLOSTAR) 100 UNIT/ML injection pen Inject 60 Units into the skin nightly 15 pen 2    lisinopril-hydroCHLOROthiazide (PRINZIDE;ZESTORETIC) 20-12.5 MG per tablet TAKE 1 TABLET BY MOUTH EVERY DAY 60 tablet 5    insulin lispro (HUMALOG KWIKPEN) 200 UNIT/ML SOPN pen Inject 10 Units into the skin 3 times daily as needed for High Blood Sugar (please continue your sliding scale as previously defined by your doctor) 5 pen 2    Dulaglutide 1.5 MG/0.5ML SOPN INJECT 1.5 MG INTO THE SKIN ONCE A WEEK INDICATIONS: DIABETES 4 pen 5    meloxicam (MOBIC) 15 MG tablet Take 15 mg by mouth omeprazole (PRILOSEC) 20 MG delayed release capsule Take 1 capsule by mouth 2 times daily (Patient taking differently: Take 20 mg by mouth 2 times daily as needed) 60 capsule 0    aspirin 81 MG chewable tablet TAKE 1 TABLET BY MOUTH DAILY (Patient taking differently: Take 81 mg by mouth in the morning. Report taking 325mg.) 30 tablet 5    oxyCODONE-acetaminophen (PERCOCET)  MG per tablet Take 1 tablet by mouth 3 times daily . Review of Systems    OBJECTIVE:    Vitals:    07/26/22 1039   BP: 126/80   Pulse: 88   Weight: (!) 301 lb (136.5 kg)   Height: 5' 7\" (1.702 m)     Results for POC orders placed in visit on 07/26/22   POCT Urinalysis no Micro   Result Value Ref Range    Color, UA YELLOW     Clarity, UA CLEAR     Glucose, UA POC NEG     Bilirubin, UA NEG     Ketones, UA NEG     Spec Grav, UA >=1.030     Blood, UA POC NEG     pH, UA 6.0     Protein, UA POC NEG     Urobilinogen, UA 0.2     Leukocytes, UA NEG     Nitrite, UA NEG        Physical Exam  Vitals reviewed. Constitutional:       General: She is not in acute distress. Appearance: Normal appearance. She is well-developed. She is morbidly obese. She is not diaphoretic. Eyes:      General: No scleral icterus. Neck:      Thyroid: No thyroid mass or thyromegaly. Vascular: No carotid bruit. Cardiovascular:      Rate and Rhythm: Normal rate and regular rhythm. Heart sounds: Normal heart sounds, S1 normal and S2 normal. No murmur heard. Pulmonary:      Effort: Pulmonary effort is normal. No respiratory distress. Breath sounds: Normal breath sounds. No decreased breath sounds, wheezing, rhonchi or rales. Abdominal:      General: Abdomen is protuberant. Bowel sounds are normal. There is no distension or abdominal bruit. Palpations: Abdomen is soft. There is no hepatomegaly or mass. Tenderness: There is no abdominal tenderness. There is no right CVA tenderness, left CVA tenderness, guarding or rebound.  Negative signs include Sellers's sign and McBurney's sign. Hernia: No hernia is present. Comments: No tenderness to abd or flank   Musculoskeletal:      Cervical back: Neck supple. Thoracic back: No spasms, tenderness or bony tenderness. No scoliosis. Lumbar back: No spasms, tenderness or bony tenderness. Negative right straight leg raise test and negative left straight leg raise test. No scoliosis. Right lower leg: No edema. Left lower leg: No edema. Comments: Unable to illicit any tenderness. When first stood up, she did have R PSIS higher than left but was able to correct with request to stand straight. Lymphadenopathy:      Cervical: No cervical adenopathy. Skin:     General: Skin is warm and dry. Coloration: Skin is not pale. Findings: No rash. Nails: There is no clubbing. Neurological:      Mental Status: She is alert and oriented to person, place, and time. Motor: No tremor or abnormal muscle tone. Coordination: Coordination normal.      Gait: Gait normal.   Psychiatric:         Speech: Speech normal.         Behavior: Behavior normal.         An electronic signature was used to authenticate this note.     Sol Zapata MD

## 2022-07-27 LAB — URINE CULTURE, ROUTINE: NORMAL

## 2022-07-27 RX ORDER — INSULIN GLARGINE 100 [IU]/ML
INJECTION, SOLUTION SUBCUTANEOUS
Qty: 15 ML | Refills: 0 | Status: SHIPPED | OUTPATIENT
Start: 2022-07-27 | End: 2022-10-28 | Stop reason: SDUPTHER

## 2022-07-28 ENCOUNTER — HOSPITAL ENCOUNTER (OUTPATIENT)
Dept: ULTRASOUND IMAGING | Age: 62
Discharge: HOME | End: 2022-07-28
Payer: COMMERCIAL

## 2022-07-28 DIAGNOSIS — M54.50 RIGHT-SIDED LOW BACK PAIN WITHOUT SCIATICA, UNSPECIFIED CHRONICITY: ICD-10-CM

## 2022-07-28 DIAGNOSIS — D25.9 UTERINE LEIOMYOMA, UNSPECIFIED LOCATION: ICD-10-CM

## 2022-07-28 PROCEDURE — 76830 TRANSVAGINAL US NON-OB: CPT

## 2022-07-28 NOTE — PROGRESS NOTES
I have reviewed the report from Dr. Daniel Edge  Patient should see a gynecologist for uterine fibroid and pelvic pain. If she continues to have flank pain please advise to make a follow-up appointment with me.   She should be due for regular appointment anyway with me

## 2022-07-28 NOTE — PROGRESS NOTES
LVM for patient with all information and request for patient to make a routine follow up appt with PCP.

## 2022-08-08 RX ORDER — AMLODIPINE BESYLATE 10 MG/1
TABLET ORAL
Qty: 30 TABLET | Refills: 0 | Status: SHIPPED | OUTPATIENT
Start: 2022-08-08 | End: 2022-10-21 | Stop reason: SDUPTHER

## 2022-08-08 NOTE — TELEPHONE ENCOUNTER
Medication:   Requested Prescriptions     Pending Prescriptions Disp Refills    amLODIPine (NORVASC) 10 MG tablet [Pharmacy Med Name: AMLODIPINE BESYLATE 10 MG TAB] 30 tablet 0     Sig: TAKE 1 TABLET BY MOUTH EVERY DAY       Last Filled:  7/5/22    Patient Phone Number: [phone] (home)     Last appt: 7/26/2022   Next appt: Visit date not found

## 2022-08-22 DIAGNOSIS — R10.9 FLANK PAIN: ICD-10-CM

## 2022-08-22 DIAGNOSIS — M54.50 RIGHT-SIDED LOW BACK PAIN WITHOUT SCIATICA, UNSPECIFIED CHRONICITY: ICD-10-CM

## 2022-08-22 RX ORDER — IBUPROFEN 800 MG/1
TABLET ORAL
Qty: 90 TABLET | Refills: 0 | Status: SHIPPED | OUTPATIENT
Start: 2022-08-22

## 2022-09-27 RX ORDER — AMLODIPINE BESYLATE 10 MG/1
TABLET ORAL
Qty: 30 TABLET | Refills: 0 | OUTPATIENT
Start: 2022-09-27

## 2022-09-28 RX ORDER — NITROGLYCERIN 80 MG/1
PATCH TRANSDERMAL
Qty: 30 PATCH | Refills: 3 | OUTPATIENT
Start: 2022-09-28

## 2022-10-15 DIAGNOSIS — E11.40 TYPE 2 DIABETES MELLITUS WITH DIABETIC NEUROPATHY, WITH LONG-TERM CURRENT USE OF INSULIN (HCC): ICD-10-CM

## 2022-10-15 DIAGNOSIS — Z79.4 TYPE 2 DIABETES MELLITUS WITH DIABETIC NEUROPATHY, WITH LONG-TERM CURRENT USE OF INSULIN (HCC): ICD-10-CM

## 2022-10-17 RX ORDER — DULAGLUTIDE 1.5 MG/.5ML
INJECTION, SOLUTION SUBCUTANEOUS
Refills: 5 | OUTPATIENT
Start: 2022-10-17

## 2022-10-21 DIAGNOSIS — I25.118 CORONARY ARTERY DISEASE OF NATIVE HEART WITH STABLE ANGINA PECTORIS, UNSPECIFIED VESSEL OR LESION TYPE (HCC): ICD-10-CM

## 2022-10-21 DIAGNOSIS — E11.40 TYPE 2 DIABETES MELLITUS WITH DIABETIC NEUROPATHY, WITH LONG-TERM CURRENT USE OF INSULIN (HCC): ICD-10-CM

## 2022-10-21 DIAGNOSIS — I10 ESSENTIAL HYPERTENSION: ICD-10-CM

## 2022-10-21 DIAGNOSIS — R00.2 PALPITATION: ICD-10-CM

## 2022-10-21 DIAGNOSIS — Z79.4 TYPE 2 DIABETES MELLITUS WITH DIABETIC NEUROPATHY, WITH LONG-TERM CURRENT USE OF INSULIN (HCC): ICD-10-CM

## 2022-10-21 RX ORDER — AMLODIPINE BESYLATE 10 MG/1
10 TABLET ORAL DAILY
Qty: 7 TABLET | Refills: 0 | Status: SHIPPED | OUTPATIENT
Start: 2022-10-21 | End: 2022-10-28 | Stop reason: SDUPTHER

## 2022-10-21 RX ORDER — METOPROLOL TARTRATE 50 MG/1
50 TABLET, FILM COATED ORAL 2 TIMES DAILY
Qty: 14 TABLET | Refills: 0 | Status: SHIPPED | OUTPATIENT
Start: 2022-10-21 | End: 2022-10-28 | Stop reason: SDUPTHER

## 2022-10-21 RX ORDER — LISINOPRIL AND HYDROCHLOROTHIAZIDE 20; 12.5 MG/1; MG/1
1 TABLET ORAL DAILY
Qty: 7 TABLET | Refills: 0 | Status: SHIPPED | OUTPATIENT
Start: 2022-10-21 | End: 2022-10-28 | Stop reason: SDUPTHER

## 2022-10-28 ENCOUNTER — OFFICE VISIT (OUTPATIENT)
Dept: INTERNAL MEDICINE CLINIC | Age: 62
End: 2022-10-28
Payer: COMMERCIAL

## 2022-10-28 VITALS
SYSTOLIC BLOOD PRESSURE: 128 MMHG | HEART RATE: 88 BPM | BODY MASS INDEX: 45.99 KG/M2 | HEIGHT: 67 IN | DIASTOLIC BLOOD PRESSURE: 80 MMHG | OXYGEN SATURATION: 98 % | WEIGHT: 293 LBS

## 2022-10-28 DIAGNOSIS — Z12.31 ENCOUNTER FOR SCREENING MAMMOGRAM FOR BREAST CANCER: ICD-10-CM

## 2022-10-28 DIAGNOSIS — Z79.4 TYPE 2 DIABETES MELLITUS WITH DIABETIC NEUROPATHY, WITH LONG-TERM CURRENT USE OF INSULIN (HCC): Primary | ICD-10-CM

## 2022-10-28 DIAGNOSIS — R00.2 PALPITATION: ICD-10-CM

## 2022-10-28 DIAGNOSIS — M54.50 RIGHT-SIDED LOW BACK PAIN WITHOUT SCIATICA, UNSPECIFIED CHRONICITY: ICD-10-CM

## 2022-10-28 DIAGNOSIS — Z11.59 NEED FOR HEPATITIS C SCREENING TEST: ICD-10-CM

## 2022-10-28 DIAGNOSIS — R10.9 FLANK PAIN: ICD-10-CM

## 2022-10-28 DIAGNOSIS — M20.40 HAMMER TOE, ACQUIRED: ICD-10-CM

## 2022-10-28 DIAGNOSIS — I25.118 CORONARY ARTERY DISEASE OF NATIVE HEART WITH STABLE ANGINA PECTORIS, UNSPECIFIED VESSEL OR LESION TYPE (HCC): ICD-10-CM

## 2022-10-28 DIAGNOSIS — Z23 NEED FOR TDAP VACCINATION: ICD-10-CM

## 2022-10-28 DIAGNOSIS — Z12.11 SCREENING FOR COLON CANCER: ICD-10-CM

## 2022-10-28 DIAGNOSIS — E11.40 TYPE 2 DIABETES MELLITUS WITH DIABETIC NEUROPATHY, WITH LONG-TERM CURRENT USE OF INSULIN (HCC): Primary | ICD-10-CM

## 2022-10-28 DIAGNOSIS — I10 ESSENTIAL HYPERTENSION: ICD-10-CM

## 2022-10-28 DIAGNOSIS — R10.9 RIGHT FLANK PAIN: ICD-10-CM

## 2022-10-28 LAB — HBA1C MFR BLD: 6.5 %

## 2022-10-28 PROCEDURE — 2022F DILAT RTA XM EVC RTNOPTHY: CPT | Performed by: INTERNAL MEDICINE

## 2022-10-28 PROCEDURE — G8417 CALC BMI ABV UP PARAM F/U: HCPCS | Performed by: INTERNAL MEDICINE

## 2022-10-28 PROCEDURE — 3078F DIAST BP <80 MM HG: CPT | Performed by: INTERNAL MEDICINE

## 2022-10-28 PROCEDURE — 99215 OFFICE O/P EST HI 40 MIN: CPT | Performed by: INTERNAL MEDICINE

## 2022-10-28 PROCEDURE — 90715 TDAP VACCINE 7 YRS/> IM: CPT | Performed by: INTERNAL MEDICINE

## 2022-10-28 PROCEDURE — 1036F TOBACCO NON-USER: CPT | Performed by: INTERNAL MEDICINE

## 2022-10-28 PROCEDURE — G8484 FLU IMMUNIZE NO ADMIN: HCPCS | Performed by: INTERNAL MEDICINE

## 2022-10-28 PROCEDURE — G8427 DOCREV CUR MEDS BY ELIG CLIN: HCPCS | Performed by: INTERNAL MEDICINE

## 2022-10-28 PROCEDURE — 83036 HEMOGLOBIN GLYCOSYLATED A1C: CPT | Performed by: INTERNAL MEDICINE

## 2022-10-28 PROCEDURE — 3074F SYST BP LT 130 MM HG: CPT | Performed by: INTERNAL MEDICINE

## 2022-10-28 PROCEDURE — 90471 IMMUNIZATION ADMIN: CPT | Performed by: INTERNAL MEDICINE

## 2022-10-28 PROCEDURE — 3044F HG A1C LEVEL LT 7.0%: CPT | Performed by: INTERNAL MEDICINE

## 2022-10-28 PROCEDURE — 3017F COLORECTAL CA SCREEN DOC REV: CPT | Performed by: INTERNAL MEDICINE

## 2022-10-28 RX ORDER — INSULIN GLARGINE 100 [IU]/ML
INJECTION, SOLUTION SUBCUTANEOUS
Qty: 15 ML | Refills: 0 | Status: SHIPPED | OUTPATIENT
Start: 2022-10-28

## 2022-10-28 RX ORDER — LANCETS 30 GAUGE
EACH MISCELLANEOUS
Qty: 300 EACH | Refills: 3 | Status: SHIPPED | OUTPATIENT
Start: 2022-10-28

## 2022-10-28 RX ORDER — METOPROLOL TARTRATE 50 MG/1
50 TABLET, FILM COATED ORAL 2 TIMES DAILY
Qty: 14 TABLET | Refills: 0 | Status: SHIPPED | OUTPATIENT
Start: 2022-10-28

## 2022-10-28 RX ORDER — LISINOPRIL AND HYDROCHLOROTHIAZIDE 20; 12.5 MG/1; MG/1
1 TABLET ORAL DAILY
Qty: 7 TABLET | Refills: 0 | Status: SHIPPED | OUTPATIENT
Start: 2022-10-28

## 2022-10-28 RX ORDER — NITROGLYCERIN 0.4 MG/1
TABLET SUBLINGUAL
Qty: 25 TABLET | Refills: 3 | Status: SHIPPED | OUTPATIENT
Start: 2022-10-28

## 2022-10-28 RX ORDER — AMLODIPINE BESYLATE 10 MG/1
10 TABLET ORAL DAILY
Qty: 7 TABLET | Refills: 0 | Status: SHIPPED | OUTPATIENT
Start: 2022-10-28 | End: 2022-11-04 | Stop reason: SDUPTHER

## 2022-10-28 RX ORDER — ALBUTEROL SULFATE 90 UG/1
2 AEROSOL, METERED RESPIRATORY (INHALATION) EVERY 6 HOURS PRN
Qty: 1 EACH | Refills: 3 | Status: SHIPPED | OUTPATIENT
Start: 2022-10-28

## 2022-10-28 RX ORDER — OMEPRAZOLE 20 MG/1
20 CAPSULE, DELAYED RELEASE ORAL 2 TIMES DAILY
Qty: 60 CAPSULE | Refills: 0 | Status: SHIPPED | OUTPATIENT
Start: 2022-10-28

## 2022-10-28 RX ORDER — NITROGLYCERIN 80 MG/1
1 PATCH TRANSDERMAL DAILY
Qty: 30 PATCH | Refills: 3 | Status: SHIPPED | OUTPATIENT
Start: 2022-10-28

## 2022-10-28 RX ORDER — BLOOD-GLUCOSE METER
1 KIT MISCELLANEOUS 3 TIMES DAILY
Qty: 100 EACH | Refills: 5 | Status: SHIPPED | OUTPATIENT
Start: 2022-10-28

## 2022-10-28 RX ORDER — INSULIN LISPRO 200 [IU]/ML
10 INJECTION, SOLUTION SUBCUTANEOUS 3 TIMES DAILY PRN
Qty: 5 ADJUSTABLE DOSE PRE-FILLED PEN SYRINGE | Refills: 2 | Status: SHIPPED | OUTPATIENT
Start: 2022-10-28

## 2022-10-28 ASSESSMENT — ENCOUNTER SYMPTOMS
TROUBLE SWALLOWING: 0
VOMITING: 0
NAUSEA: 0
VOICE CHANGE: 0
PHOTOPHOBIA: 0
SHORTNESS OF BREATH: 0
WHEEZING: 0

## 2022-10-28 NOTE — PROGRESS NOTES
Agatha Parker  1960  female  57 y.o. SUBJECTIVE:       Chief Complaint   Patient presents with    Medication Refill       HPI:  Follow-up visit for chronic problems. Patient continues to complain of pain at the right flank usually radiate from right paravertebral area to the right flank. Her symptoms almost for a year. She has evaluated by gynecologist for fibroid uterus. She denies fever chills nausea vomiting or systemic symptoms. She denies any constipation symptoms. History of lumbar herniated disc. Patient is getting pain medicine because of chronic bilateral knee pain. History of diabetes mellitus. 1 the lowest blood sugar was 48 since then she reduce her long-acting insulin to 54 units. Since then no more hypoglycemic symptoms. H/O coronary artery disease. Patient denies chest pain palpitation dizziness shortness of breath. She is again advised to call and make follow-up appointment with her cardiologist for at least yearly checkup. Patient Is requesting multiple cardiac medicine refill including nitroglycerin    Past Medical History:   Diagnosis Date    Anxiety     Arthritis     Asthma     as child    Bilateral chronic knee pain     sees pain management    Degenerative disc disease, cervical MRI 2011    Multilevel cervical degenerative this disease most significant at the C7-T1 level where there is probable impingement of the exiting right C8 nerve root caused by disc herniation. Depression     Diabetes     Shayla Riley, NP at [facility]    High blood pressure     Hypertriglyceridemia     Lumbar herniated disc MRI 2011    L5-S1    Morbid obesity ([address]     Opiate dependence ([address]     Osteomyelitis ([address]     Pneumonia     as child     Past Surgical History:   Procedure Laterality Date    CARDIAC CATHETERIZATION  11/2017    1. Diffusely diseased distal LAD.   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   Tobacco Use    Smoking status: Never    Smokeless tobacco: Never   Substance and Sexual Activity    Alcohol use: Not Currently     Alcohol/week: 0.0 - 3.0 standard drinks    Drug use: No     Social Determinants of Health     Financial Resource Strain: Low Risk     Difficulty of Paying Living Expenses: Not hard at all   Food Insecurity: No Food Insecurity    Worried About Running Out of Food in the Last Year: Never true    Ran Out of Food in the Last Year: Never true     Family History   Problem Relation Age of Onset    Lung Cancer Mother 48        + tob    Osteoarthritis Other     Cancer Other     Diabetes Other     Hypertension Other     Stroke Other     Heart Disease Other     Diabetes Maternal Grandmother        Review of Systems   Constitutional:  Negative for appetite change and unexpected weight change. HENT:  Negative for trouble swallowing and voice change. Eyes:  Negative for photophobia and visual disturbance. Respiratory:  Negative for shortness of breath and wheezing. Cardiovascular:  Negative for chest pain. Gastrointestinal:  Negative for nausea and vomiting. Genitourinary:  Negative for difficulty urinating and flank pain. Neurological:  Negative for dizziness, light-headedness and headaches. OBJECTIVE:  Pulse Readings from Last 4 Encounters:   10/28/22 88   07/26/22 88   01/14/22 78   01/03/22 84     Wt Readings from Last 4 Encounters:   10/28/22 (!) 317 lb 6.4 oz (144 kg)   07/26/22 (!) 301 lb (136.5 kg)   01/14/22 282 lb 12.8 oz (128.3 kg)   12/19/21 286 lb 4.8 oz (129.9 kg)     BP Readings from Last 4 Encounters:   10/28/22 128/80   07/26/22 126/80   01/14/22 119/79   01/03/22 131/71     Physical Exam  Vitals and nursing note reviewed. Constitutional:       General: She is not in acute distress. Appearance: She is obese. She is not ill-appearing. Eyes:      Conjunctiva/sclera: Conjunctivae normal.   Neck:      Vascular: No carotid bruit. Cardiovascular:      Rate and Rhythm: Normal rate and regular rhythm. Pulses: Normal pulses. Heart sounds: Normal heart sounds. Pulmonary:      Effort: Pulmonary effort is normal.      Breath sounds: Normal breath sounds. Abdominal:      General: Bowel sounds are normal.      Tenderness: There is no left CVA tenderness, guarding or rebound. Comments: Slight tenderness of the right flank and right lumbar paraspinal area   Musculoskeletal:      Comments: Mild bilateral pretibial puffiness. Sensory exam of the foot is normal, tested with the monofilament. Bilateral hammertoe deformities of the second toe. Callus at the great toes area   Neurological:      General: No focal deficit present. Mental Status: She is alert and oriented to person, place, and time.        CBC:   Lab Results   Component Value Date/Time    WBC 6.6 12/19/2021 12:58 PM    HGB 11.5 12/19/2021 12:58 PM    HCT 33.3 12/19/2021 12:58 PM     12/19/2021 12:58 PM     CMP:  Lab Results   Component Value Date/Time     06/10/2022 10:41 AM    K 4.0 06/10/2022 10:41 AM    K 4.5 09/29/2021 11:13 AM     06/10/2022 10:41 AM    CO2 25 06/10/2022 10:41 AM    ANIONGAP 13 06/10/2022 10:41 AM    GLUCOSE 136 07/25/2022 03:39 PM    BUN 14 06/10/2022 10:41 AM    CREATININE 0.9 06/10/2022 10:41 AM    GFRAA >60 06/10/2022 10:41 AM    CALCIUM 9.5 06/10/2022 10:41 AM    PROT 7.2 12/19/2021 12:58 PM    LABALBU 4.0 12/19/2021 12:58 PM    AGRATIO 1.3 12/19/2021 12:58 PM    BILITOT 0.5 12/19/2021 12:58 PM    ALKPHOS 96 12/19/2021 12:58 PM    ALT <5 12/19/2021 12:58 PM    AST 11 12/19/2021 12:58 PM    GLOB 3.4 09/29/2021 11:13 AM     URINALYSIS:  Lab Results   Component Value Date/Time    GLUCOSEU NEG 07/26/2022 11:11 AM    GLUCOSEU Negative 12/19/2021 12:53 PM    KETUA NEG 07/26/2022 11:11 AM    KETUA Negative 12/19/2021 12:53 PM    SPECGRAV >=1.030 07/26/2022 11:11 AM    SPECGRAV 1.020 12/19/2021 12:53 PM    BLOODU NEG 07/26/2022 11:11 AM    BLOODU LARGE 12/19/2021 12:53 PM    PHUR 6.0 07/26/2022 11:11 AM    PHUR 6.0 12/19/2021 12:53 PM    PROTEINU NEG 07/26/2022 11:11 AM    PROTEINU Negative 12/19/2021 12:53 PM    NITRU Negative 12/19/2021 12:53 PM    LEUKOCYTESUR NEG 07/26/2022 11:11 AM    LEUKOCYTESUR Negative 12/19/2021 12:53 PM    LABMICR <1.20 06/10/2022 10:51 AM    LABMICR YES 12/19/2021 12:53 PM    URINETYPE Voided 12/19/2021 12:53 PM     HBA1C:   Lab Results   Component Value Date/Time    LABA1C 6.5 10/28/2022 04:43 PM    .9 06/10/2022 10:41 AM     MICRO/ALB:   Lab Results   Component Value Date/Time    LABMICR <1.20 06/10/2022 10:51 AM    LABMICR YES 12/19/2021 12:53 PM    LABCREA 107.1 06/10/2022 10:51 AM    MALBCR see below 06/10/2022 10:51 AM     LIPID:  Lab Results   Component Value Date/Time    CHOL 186 10/19/2020 10:17 AM    TRIG 99 10/19/2020 10:17 AM    HDL 77 06/10/2022 10:41 AM    LDLCALC 81 06/10/2022 10:41 AM    LABVLDL 21 06/10/2022 10:41 AM     TSH:   Lab Results   Component Value Date/Time    TSHREFLEX 0.89 01/23/2017 04:49 PM         ASSESSMENT/PLAN:  Assessment/Plan:  Linden Weiss was seen today for medication refill. Diagnoses and all orders for this visit:    Type 2 diabetes mellitus with diabetic neuropathy, with long-term current use of insulin (HCC)  Excellent hemoglobin A1c. In addition to diet lifestyle changes continue current diabetic medicines. Patient is taking long-acting insulin 54units  -     dulaglutide (TRULICITY) 1.5 TK/4.0TF SC injection; INJECT 1.5 MG INTO THE SKIN ONCE A WEEK INDICATIONS: DIABETES  -     insulin lispro (HUMALOG KWIKPEN) 200 UNIT/ML SOPN pen;  Inject 10 Units into the skin 3 times daily as needed for High Blood Sugar (please continue your sliding scale as previously defined by your doctor)  -     Insulin Pen Needle 32G X 4 MM MISC; Use to give LA and SA insulin 5 x daily  -     blood glucose test strips (FREESTYLE LITE) strip; 1 each by In Vitro route 3 times daily  -     POCT glycosylated hemoglobin (Hb A1C)  -      DIABETES FOOT EXAM  -     Hepatic Function Panel; Future  She is again advised to make appointment with ophthalmologist for diabetic eye exam.  She promises going to make an appointment in [facility]. Flank pain  We will  get CT scan of the abdomen  Right-sided low back pain without sciatica, unspecified chronicity  Pain almost 1 year most likely musculoskeletal pain. History of lumbar herniation. Denies any bladder or bowel incontinence. Gastroesophageal reflux disease  -     omeprazole (PRILOSEC) 20 MG delayed release capsule; Take 1 capsule by mouth 2 times daily    Essential hypertension  -     metoprolol tartrate (LOPRESSOR) 50 MG tablet; Take 1 tablet by mouth 2 times daily  -     lisinopril-hydroCHLOROthiazide (PRINZIDE;ZESTORETIC) 20-12.5 MG per tablet; Take 1 tablet by mouth daily    Palpitation  Patient denies chest pain palpitation dizziness. -     metoprolol tartrate (LOPRESSOR) 50 MG tablet; Take 1 tablet by mouth 2 times daily    Coronary artery disease of native heart with stable angina pectoris, unspecified vessel or lesion type ([facility]  I again advised patient she need to reestablish with her cardiologist.  -     metoprolol tartrate (LOPRESSOR) 50 MG tablet; Take 1 tablet by mouth 2 times daily    Need for hepatitis C screening test  -     Hepatitis C Antibody; Future    Encounter for screening mammogram for breast cancer  -     [facility] Digital Screen Bilateral [[id number]]; Future    Screening for colon cancer  -     FIT-DNA (Cologuard)    Right flank pain  -     Basic Metabolic Panel; Future  -     CBC; Future  -     CT ABDOMEN PELVIS WO CONTRAST Additional Contrast? None; Future  -     Hepatic Function Panel;  Future    Hammer toe, acquired  -     AFL - Cindy Hdez DPM, Podiatry, [facility] orders  -     albuterol sulfate HFA (PROAIR HFA) 108 (90 Base) MCG/ACT inhaler; Inhale 2 puffs into the lungs every 6 hours as needed for Wheezing  -     amLODIPine (NORVASC) 10 MG tablet; Take 1 tablet by mouth daily  -     nitroGLYCERIN (NITRODUR) 0.4 MG/HR; Place 1 patch onto the skin daily  -     nitroGLYCERIN (NITROSTAT) 0.4 MG SL tablet; PLACE 1 TABLET UNDER TONGUE EVERY 5 MINUTES AS NEEDED FOR CHEST PAIN. MAX OF 3 DOSES  -     insulin glargine (LANTUS SOLOSTAR) 100 UNIT/ML injection pen; INJECT 54 UNITS INTO THE SKIN EVERY NIGHT  -     Lancets MISC; #300  DAYS, TESTING TID, E11.9  -     Tdap, BOOSTRIX, (age 8 yrs+), IM    Extensive counseling regarding preventive health care and  age related preventive recommendation discussed with the patient. Patient has been declining multiple vaccinations.         Orders Placed This Encounter   Procedures    FIT-DNA (Cologuard)    [facility] Digital Screen Bilateral [[id number]]     Standing Status:   Future     Standing Expiration Date:   10/28/2023    CT ABDOMEN PELVIS WO CONTRAST Additional Contrast? None     Standing Status:   Future     Standing Expiration Date:   10/28/2023     Order Specific Question:   Additional Contrast?     Answer:   None    Tdap, BOOSTRIX, (age 8 yrs+), IM    Hepatitis C Antibody     Standing Status:   Future     Standing Expiration Date:   24/15/1736    Basic Metabolic Panel     Standing Status:   Future     Standing Expiration Date:   10/28/2023    CBC     Standing Status:   Future     Standing Expiration Date:   10/28/2023    Hepatic Function Panel     Standing Status:   Future     Standing Expiration Date:   10/28/2023    ROSALBA Mcdonald DPM, Podiatry, [facility]     Referral Priority:   Routine     Referral Type:   Eval and Treat     Referral Reason:   Specialty Services Required     Referred to Provider:   Boni Stacy DPM     Requested Specialty:   Podiatry     Number of Visits Requested:   1    POCT glycosylated hemoglobin (Hb A1C)     DIABETES FOOT EXAM     Current Outpatient Medications   Medication Sig Dispense Refill    albuterol sulfate HFA (PROAIR HFA) 108 (90 Base) MCG/ACT inhaler Inhale 2 puffs into the lungs every 6 hours as needed for Wheezing 1 each 3    amLODIPine (NORVASC) 10 MG tablet Take 1 tablet by mouth daily 7 tablet 0    dulaglutide (TRULICITY) 1.5 BP/4.1VE SC injection INJECT 1.5 MG INTO THE SKIN ONCE A WEEK INDICATIONS: DIABETES 1 Adjustable Dose Pre-filled Pen Syringe 0    omeprazole (PRILOSEC) 20 MG delayed release capsule Take 1 capsule by mouth 2 times daily 60 capsule 0    nitroGLYCERIN (NITRODUR) 0.4 MG/HR Place 1 patch onto the skin daily 30 patch 3    nitroGLYCERIN (NITROSTAT) 0.4 MG SL tablet PLACE 1 TABLET UNDER TONGUE EVERY 5 MINUTES AS NEEDED FOR CHEST PAIN. MAX OF 3 DOSES 25 tablet 3    metoprolol tartrate (LOPRESSOR) 50 MG tablet Take 1 tablet by mouth 2 times daily 14 tablet 0    lisinopril-hydroCHLOROthiazide (PRINZIDE;ZESTORETIC) 20-12.5 MG per tablet Take 1 tablet by mouth daily 7 tablet 0    insulin glargine (LANTUS SOLOSTAR) 100 UNIT/ML injection pen INJECT 54 UNITS INTO THE SKIN EVERY NIGHT 15 mL 0    insulin lispro (HUMALOG KWIKPEN) 200 UNIT/ML SOPN pen Inject 10 Units into the skin 3 times daily as needed for High Blood Sugar (please continue your sliding scale as previously defined by your doctor) 5 Adjustable Dose Pre-filled Pen Syringe 2    Insulin Pen Needle 32G X 4 MM MISC Use to give LA and SA insulin 5 x daily 150 each 5    Lancets MISC #300  DAYS, TESTING TID, E11.9 300 each 3    blood glucose test strips (FREESTYLE LITE) strip 1 each by In Vitro route 3 times daily 100 each 5    ibuprofen (ADVIL;MOTRIN) 800 MG tablet TAKE 1 TWO TO THREE TIMES PER DAY REGULARLY. TAKE WITH FOOD OR MILK. 90 tablet 0    oxyCODONE-acetaminophen (PERCOCET)  MG per tablet Take 1 tablet by mouth 3 times daily as needed for Pain.  Per pain management at [facility]      glucose monitoring (FREESTYLE) kit 1 kit by Does not apply route daily 1 kit 0    Blood Pressure KIT Use as directed 1 kit 0    aspirin 81 MG chewable tablet TAKE 1 TABLET BY MOUTH DAILY (Patient taking differently: Take 81 mg by mouth daily Report taking 325mg) 30 tablet 5    Lancet Devices (ACCU-CHEK SOFTCLIX LANCET DEV) MISC 1 Units by Does not apply route 4 times daily (before meals and nightly) 100 each 3    SOFT TOUCH LANCETS MISC 1 strip by Does not apply route 4 times daily 100 each 3    UNABLE TO FIND GLUCOSE TEST STRIPS, TESTING TID, ON INSULIN, E11.9 #300  DAYS  DISPENSE STRIPS BEST COVERED BY INSURANCE (Patient not taking: Reported on 7/26/2022) 300 each 3    glucagon 1 MG injection Inject 1 mg into the skin See Admin Instructions Follow package directions for low blood sugar. (Patient not taking: No sig reported) 1 kit 3    UNABLE TO FIND Glucometer #1, E11.9, testing TID. ON INSULIN. Home pt. DISPENSE METER BEST COVERED BY INSURANCE (Patient not taking: Reported on 7/26/2022) 1 each 0     No current facility-administered medications for this visit. Return in about 3 months (around 1/28/2023). An After Visit Summary was printed and given to the patient. Documentation was done using voice recognition dragon software. Every effort was made to ensure accuracy; however, inadvertent  Unintentional computerized transcription errors may be present.

## 2022-10-28 NOTE — RESULT ENCOUNTER NOTE
Patient will continue current same regimen of diabetic medicine including long-acting and short acting insulins.

## 2022-11-04 ENCOUNTER — TELEPHONE (OUTPATIENT)
Dept: INTERNAL MEDICINE CLINIC | Age: 62
End: 2022-11-04

## 2022-11-04 DIAGNOSIS — I10 ESSENTIAL HYPERTENSION: ICD-10-CM

## 2022-11-04 RX ORDER — AMLODIPINE BESYLATE 10 MG/1
10 TABLET ORAL DAILY
Qty: 90 TABLET | Refills: 0 | Status: SHIPPED | OUTPATIENT
Start: 2022-11-04

## 2022-11-04 NOTE — TELEPHONE ENCOUNTER
Patient reporting PA is needed for Trulicity and provided the following info:    Provider phone  number is [phone]  [facility] Eleuterio Subramanian [address]   Group# [id number]  Member: Lucita Merino   Member ID: [id number]  [address]: [id number]  PCN: [id number]  Rx Group: [facility]

## 2022-11-07 ENCOUNTER — TELEPHONE (OUTPATIENT)
Dept: ADMINISTRATIVE | Age: 62
End: 2022-11-07

## 2022-11-08 NOTE — TELEPHONE ENCOUNTER
Patient's pharmacy benefits is managed by [employer]. Called for status of PA for Trulicity 5.6CT/9.2LV pen-injectors due to faxed received. Told medication was rejected due to cost and to call [facility] at [phone]. Letter attached. Have called [facility] and a nurse is to call me back.

## 2022-11-09 NOTE — TELEPHONE ENCOUNTER
Called [facility] ([phone])EX speak to a nurse about the fax I received. Spoke to [employer] who states the pharmacy needs to rerun the medication again. He thought I was a pharmacist.  Lines mixed up. [facility] has to be the one to override on the medication. Nilo Weiner stated he would call the pharmacy listed on script. If this requires a response please respond to the pool ( P [facility] [address]).

## 2022-12-01 ENCOUNTER — TELEPHONE (OUTPATIENT)
Dept: INTERNAL MEDICINE CLINIC | Age: 62
End: 2022-12-01

## 2022-12-01 NOTE — TELEPHONE ENCOUNTER
Reminder letter sent re below test and mammogram which has also not been completed.       PROVIDER FEEDBACK LOOP CALLED 3X     Patient:Belkis Ye Angelucci  : 1960  Referring Provider: Bayron Cardona  Referral Type:  Imaging     Procedures:  [id number] - CT ABDOMEN PELVIS WO CONTRAST  Date Service Ordered 10/28/2022

## 2022-12-08 DIAGNOSIS — E11.40 TYPE 2 DIABETES MELLITUS WITH DIABETIC NEUROPATHY, WITH LONG-TERM CURRENT USE OF INSULIN (HCC): ICD-10-CM

## 2022-12-08 DIAGNOSIS — Z79.4 TYPE 2 DIABETES MELLITUS WITH DIABETIC NEUROPATHY, WITH LONG-TERM CURRENT USE OF INSULIN (HCC): ICD-10-CM

## 2022-12-08 RX ORDER — DULAGLUTIDE 1.5 MG/.5ML
INJECTION, SOLUTION SUBCUTANEOUS
Qty: 2 ADJUSTABLE DOSE PRE-FILLED PEN SYRINGE | Refills: 1 | Status: SHIPPED | OUTPATIENT
Start: 2022-12-08

## 2022-12-21 ENCOUNTER — TELEPHONE (OUTPATIENT)
Dept: INTERNAL MEDICINE CLINIC | Age: 62
End: 2022-12-21

## 2022-12-21 NOTE — TELEPHONE ENCOUNTER
Please see message from patient:    My insurance changed on Dec 1 and needs a authorization for the [employer] member services  [phone]  Member ID [id number]  Group [id number]  Plan [id number]

## 2022-12-28 DIAGNOSIS — Z79.4 TYPE 2 DIABETES MELLITUS WITH DIABETIC NEUROPATHY, WITH LONG-TERM CURRENT USE OF INSULIN (HCC): ICD-10-CM

## 2022-12-28 DIAGNOSIS — E11.40 TYPE 2 DIABETES MELLITUS WITH DIABETIC NEUROPATHY, WITH LONG-TERM CURRENT USE OF INSULIN (HCC): ICD-10-CM

## 2022-12-28 NOTE — TELEPHONE ENCOUNTER
Medication:   Requested Prescriptions     Pending Prescriptions Disp Refills    LANTUS SOLOSTAR 100 UNIT/ML injection pen [Pharmacy Med Name: Svitlana Vaibhav 100 UNIT/ML]       Sig: INJECT 54 UNITS INTO THE SKIN EVERY NIGHT       Last Filled:      Patient Phone Number: [phone] (home)     Last appt: 10/28/2022   Next appt: Visit date not found  Rec OV: 1/28/2023- 3 mths

## 2022-12-29 RX ORDER — INSULIN GLARGINE 100 [IU]/ML
INJECTION, SOLUTION SUBCUTANEOUS
Qty: 15 ADJUSTABLE DOSE PRE-FILLED PEN SYRINGE | Refills: 3 | Status: SHIPPED | OUTPATIENT
Start: 2022-12-29

## 2022-12-31 DIAGNOSIS — I10 ESSENTIAL HYPERTENSION: ICD-10-CM

## 2022-12-31 DIAGNOSIS — R00.2 PALPITATION: ICD-10-CM

## 2022-12-31 DIAGNOSIS — I25.118 CORONARY ARTERY DISEASE OF NATIVE HEART WITH STABLE ANGINA PECTORIS, UNSPECIFIED VESSEL OR LESION TYPE (HCC): ICD-10-CM

## 2023-01-03 RX ORDER — METOPROLOL TARTRATE 50 MG/1
TABLET, FILM COATED ORAL
Qty: 180 TABLET | Refills: 0 | Status: SHIPPED | OUTPATIENT
Start: 2023-01-03

## 2023-01-03 NOTE — TELEPHONE ENCOUNTER
Future Appointments    This patient does not currently have any appointments scheduled.   Past Visits    Date Provider Specialty Visit Type Primary Dx   10/28/2022 Reno Bradley MD Internal Medicine Office Visit Type 2 diabetes mellitus with diabetic neuropathy, with long-term current use of insulin ([facility]

## 2023-02-24 DIAGNOSIS — I25.118 CORONARY ARTERY DISEASE OF NATIVE HEART WITH STABLE ANGINA PECTORIS, UNSPECIFIED VESSEL OR LESION TYPE (HCC): ICD-10-CM

## 2023-02-24 RX ORDER — NITROGLYCERIN 80 MG/1
PATCH TRANSDERMAL
Qty: 90 PATCH | Refills: 0 | Status: SHIPPED | OUTPATIENT
Start: 2023-02-24

## 2023-03-16 DIAGNOSIS — R10.9 RIGHT FLANK PAIN: ICD-10-CM

## 2023-03-16 DIAGNOSIS — E11.40 TYPE 2 DIABETES MELLITUS WITH DIABETIC NEUROPATHY, WITH LONG-TERM CURRENT USE OF INSULIN (HCC): ICD-10-CM

## 2023-03-16 DIAGNOSIS — Z79.4 TYPE 2 DIABETES MELLITUS WITH DIABETIC NEUROPATHY, WITH LONG-TERM CURRENT USE OF INSULIN (HCC): ICD-10-CM

## 2023-03-16 DIAGNOSIS — Z11.59 NEED FOR HEPATITIS C SCREENING TEST: ICD-10-CM

## 2023-03-16 LAB
ALBUMIN SERPL-MCNC: 4.2 G/DL (ref 3.4–5)
ALP SERPL-CCNC: 106 U/L (ref 40–129)
ALT SERPL-CCNC: 6 U/L (ref 10–40)
ANION GAP SERPL CALCULATED.3IONS-SCNC: 15 MMOL/L (ref 3–16)
AST SERPL-CCNC: 9 U/L (ref 15–37)
BILIRUB DIRECT SERPL-MCNC: <0.2 MG/DL (ref 0–0.3)
BILIRUB INDIRECT SERPL-MCNC: ABNORMAL MG/DL (ref 0–1)
BILIRUB SERPL-MCNC: 0.5 MG/DL (ref 0–1)
BUN SERPL-MCNC: 23 MG/DL (ref 7–20)
CALCIUM SERPL-MCNC: 9.7 MG/DL (ref 8.3–10.6)
CHLORIDE SERPL-SCNC: 100 MMOL/L (ref 99–110)
CO2 SERPL-SCNC: 27 MMOL/L (ref 21–32)
CREAT SERPL-MCNC: 1.3 MG/DL (ref 0.6–1.2)
DEPRECATED RDW RBC AUTO: 14.1 % (ref 12.4–15.4)
GFR SERPLBLD CREATININE-BSD FMLA CKD-EPI: 46 ML/MIN/{1.73_M2}
GLUCOSE SERPL-MCNC: 291 MG/DL (ref 70–99)
HCT VFR BLD AUTO: 37.5 % (ref 36–48)
HCV AB SERPL QL IA: NORMAL
HGB BLD-MCNC: 12.5 G/DL (ref 12–16)
MCH RBC QN AUTO: 29.2 PG (ref 26–34)
MCHC RBC AUTO-ENTMCNC: 33.5 G/DL (ref 31–36)
MCV RBC AUTO: 87.2 FL (ref 80–100)
PLATELET # BLD AUTO: 310 K/UL (ref 135–450)
PMV BLD AUTO: 9.2 FL (ref 5–10.5)
POTASSIUM SERPL-SCNC: 4.4 MMOL/L (ref 3.5–5.1)
PROT SERPL-MCNC: 7.6 G/DL (ref 6.4–8.2)
RBC # BLD AUTO: 4.3 M/UL (ref 4–5.2)
SODIUM SERPL-SCNC: 142 MMOL/L (ref 136–145)
WBC # BLD AUTO: 6.7 K/UL (ref 4–11)

## 2023-03-20 DIAGNOSIS — E11.40 TYPE 2 DIABETES MELLITUS WITH DIABETIC NEUROPATHY, WITH LONG-TERM CURRENT USE OF INSULIN (HCC): ICD-10-CM

## 2023-03-20 DIAGNOSIS — Z79.4 TYPE 2 DIABETES MELLITUS WITH DIABETIC NEUROPATHY, WITH LONG-TERM CURRENT USE OF INSULIN (HCC): ICD-10-CM

## 2023-03-20 RX ORDER — DULAGLUTIDE 1.5 MG/.5ML
INJECTION, SOLUTION SUBCUTANEOUS
Refills: 1 | OUTPATIENT
Start: 2023-03-20

## 2023-03-21 DIAGNOSIS — Z79.4 TYPE 2 DIABETES MELLITUS WITH DIABETIC NEUROPATHY, WITH LONG-TERM CURRENT USE OF INSULIN (HCC): ICD-10-CM

## 2023-03-21 DIAGNOSIS — E11.40 TYPE 2 DIABETES MELLITUS WITH DIABETIC NEUROPATHY, WITH LONG-TERM CURRENT USE OF INSULIN (HCC): ICD-10-CM

## 2023-03-21 DIAGNOSIS — I10 ESSENTIAL HYPERTENSION: ICD-10-CM

## 2023-03-22 RX ORDER — LISINOPRIL AND HYDROCHLOROTHIAZIDE 20; 12.5 MG/1; MG/1
TABLET ORAL
Qty: 30 TABLET | Refills: 0 | Status: SHIPPED | OUTPATIENT
Start: 2023-03-22

## 2023-03-22 RX ORDER — DULAGLUTIDE 1.5 MG/.5ML
INJECTION, SOLUTION SUBCUTANEOUS
Qty: 1 ADJUSTABLE DOSE PRE-FILLED PEN SYRINGE | Refills: 0 | Status: SHIPPED | OUTPATIENT
Start: 2023-03-22

## 2023-03-22 NOTE — TELEPHONE ENCOUNTER
Future Appointments    Encounter Information    Provider Department Appt Notes   3/23/2023 Lisa Schilling MD [facility] Internal Medicine [phone] discuss plan, abn results     Past Visits    Date Provider Specialty Visit Type Primary Dx   10/28/2022 Lisa Schilling MD Internal Medicine Office Visit Type 2 diabetes mellitus with diabetic neuropathy, with long-term current use of insulin ([facility]

## 2023-03-23 ENCOUNTER — TELEMEDICINE (OUTPATIENT)
Dept: INTERNAL MEDICINE CLINIC | Age: 63
End: 2023-03-23
Payer: COMMERCIAL

## 2023-03-23 DIAGNOSIS — Z79.4 TYPE 2 DIABETES MELLITUS WITH DIABETIC NEUROPATHY, WITH LONG-TERM CURRENT USE OF INSULIN (HCC): Primary | ICD-10-CM

## 2023-03-23 DIAGNOSIS — M79.89 LEG SWELLING: ICD-10-CM

## 2023-03-23 DIAGNOSIS — I50.20 SYSTOLIC CONGESTIVE HEART FAILURE, UNSPECIFIED HF CHRONICITY (HCC): ICD-10-CM

## 2023-03-23 DIAGNOSIS — N28.9 RENAL IMPAIRMENT: ICD-10-CM

## 2023-03-23 DIAGNOSIS — I10 ESSENTIAL HYPERTENSION: ICD-10-CM

## 2023-03-23 DIAGNOSIS — E11.40 TYPE 2 DIABETES MELLITUS WITH DIABETIC NEUROPATHY, WITH LONG-TERM CURRENT USE OF INSULIN (HCC): Primary | ICD-10-CM

## 2023-03-23 PROCEDURE — 99214 OFFICE O/P EST MOD 30 MIN: CPT | Performed by: INTERNAL MEDICINE

## 2023-03-23 PROCEDURE — G8417 CALC BMI ABV UP PARAM F/U: HCPCS | Performed by: INTERNAL MEDICINE

## 2023-03-23 PROCEDURE — G8400 PT W/DXA NO RESULTS DOC: HCPCS | Performed by: INTERNAL MEDICINE

## 2023-03-23 PROCEDURE — G8427 DOCREV CUR MEDS BY ELIG CLIN: HCPCS | Performed by: INTERNAL MEDICINE

## 2023-03-23 PROCEDURE — 2022F DILAT RTA XM EVC RTNOPTHY: CPT | Performed by: INTERNAL MEDICINE

## 2023-03-23 PROCEDURE — G8484 FLU IMMUNIZE NO ADMIN: HCPCS | Performed by: INTERNAL MEDICINE

## 2023-03-23 PROCEDURE — 3046F HEMOGLOBIN A1C LEVEL >9.0%: CPT | Performed by: INTERNAL MEDICINE

## 2023-03-23 PROCEDURE — 1036F TOBACCO NON-USER: CPT | Performed by: INTERNAL MEDICINE

## 2023-03-23 PROCEDURE — 1123F ACP DISCUSS/DSCN MKR DOCD: CPT | Performed by: INTERNAL MEDICINE

## 2023-03-23 PROCEDURE — 3017F COLORECTAL CA SCREEN DOC REV: CPT | Performed by: INTERNAL MEDICINE

## 2023-03-23 PROCEDURE — 1090F PRES/ABSN URINE INCON ASSESS: CPT | Performed by: INTERNAL MEDICINE

## 2023-03-23 SDOH — ECONOMIC STABILITY: FOOD INSECURITY: WITHIN THE PAST 12 MONTHS, THE FOOD YOU BOUGHT JUST DIDN'T LAST AND YOU DIDN'T HAVE MONEY TO GET MORE.: NEVER TRUE

## 2023-03-23 SDOH — ECONOMIC STABILITY: HOUSING INSECURITY
IN THE LAST 12 MONTHS, WAS THERE A TIME WHEN YOU DID NOT HAVE A STEADY PLACE TO SLEEP OR SLEPT IN A SHELTER (INCLUDING NOW)?: NO

## 2023-03-23 SDOH — ECONOMIC STABILITY: FOOD INSECURITY: WITHIN THE PAST 12 MONTHS, YOU WORRIED THAT YOUR FOOD WOULD RUN OUT BEFORE YOU GOT MONEY TO BUY MORE.: NEVER TRUE

## 2023-03-23 SDOH — ECONOMIC STABILITY: INCOME INSECURITY: HOW HARD IS IT FOR YOU TO PAY FOR THE VERY BASICS LIKE FOOD, HOUSING, MEDICAL CARE, AND HEATING?: NOT HARD AT ALL

## 2023-03-23 ASSESSMENT — PATIENT HEALTH QUESTIONNAIRE - PHQ9
SUM OF ALL RESPONSES TO PHQ9 QUESTIONS 1 & 2: 0
8. MOVING OR SPEAKING SO SLOWLY THAT OTHER PEOPLE COULD HAVE NOTICED. OR THE OPPOSITE, BEING SO FIGETY OR RESTLESS THAT YOU HAVE BEEN MOVING AROUND A LOT MORE THAN USUAL: 0
10. IF YOU CHECKED OFF ANY PROBLEMS, HOW DIFFICULT HAVE THESE PROBLEMS MADE IT FOR YOU TO DO YOUR WORK, TAKE CARE OF THINGS AT HOME, OR GET ALONG WITH OTHER PEOPLE: 0
6. FEELING BAD ABOUT YOURSELF - OR THAT YOU ARE A FAILURE OR HAVE LET YOURSELF OR YOUR FAMILY DOWN: 0
9. THOUGHTS THAT YOU WOULD BE BETTER OFF DEAD, OR OF HURTING YOURSELF: 0
SUM OF ALL RESPONSES TO PHQ QUESTIONS 1-9: 0
1. LITTLE INTEREST OR PLEASURE IN DOING THINGS: 0
7. TROUBLE CONCENTRATING ON THINGS, SUCH AS READING THE NEWSPAPER OR WATCHING TELEVISION: 0
SUM OF ALL RESPONSES TO PHQ QUESTIONS 1-9: 0
3. TROUBLE FALLING OR STAYING ASLEEP: 0
5. POOR APPETITE OR OVEREATING: 0
4. FEELING TIRED OR HAVING LITTLE ENERGY: 0
SUM OF ALL RESPONSES TO PHQ QUESTIONS 1-9: 0
SUM OF ALL RESPONSES TO PHQ QUESTIONS 1-9: 0
2. FEELING DOWN, DEPRESSED OR HOPELESS: 0

## 2023-03-23 ASSESSMENT — ENCOUNTER SYMPTOMS
WHEEZING: 0
NAUSEA: 0
VOMITING: 0
SHORTNESS OF BREATH: 0
VOICE CHANGE: 0
TROUBLE SWALLOWING: 0
PHOTOPHOBIA: 0

## 2023-03-23 NOTE — PROGRESS NOTES
Distal LAD has a 50% narrowing in two ---   ,   Social History     Tobacco Use    Smoking status: Never    Smokeless tobacco: Never   Substance Use Topics    Alcohol use: Not Currently     Alcohol/week: 0.0 - 3.0 standard drinks    Drug use: No   ,   Family History   Problem Relation Age of Onset    Lung Cancer Mother 48        + tob    Osteoarthritis Other     Cancer Other     Diabetes Other     Hypertension Other     Stroke Other     Heart Disease Other     Diabetes Maternal Grandmother    ,   Immunization History   Administered Date(s) Administered    COVID-19, MODERNA BLUE border, Primary or Immunocompromised, (age 12y+), IM, 100 mcg/0.5mL 03/29/2021    Influenza, AFLURIA (age 1 yrs+), FLUZONE, (age 10 mo+), MDV, 0.5mL 11/02/2016    Pneumococcal, PPSV23, PNEUMOVAX 21, (age 2y+), SC/IM, 0.5mL 04/28/2011, 11/02/2016    TDaP, ADACEL (age 10y-63y), [address] (age 10y+), IM, 0.5mL 10/28/2022       PHYSICAL EXAMINATION:  [ INSTRUCTIONS:  \"[x]\" Indicates a positive item  \"[]\" Indicates a negative item  -- DELETE ALL ITEMS NOT EXAMINED]  Vital Signs: (As obtained by patient/caregiver or practitioner observation)      Constitutional: [x] Appears well-developed and well-nourished [] No apparent distress      [] Abnormal- obese  Mental status  [x] Alert and awake  [x] Oriented to person/place/time [x]Able to follow commands      Eyes:  EOM    []  Normal  [] Abnormal-  Sclera  []  Normal  [] Abnormal -         Discharge [x]  None visible  [] Abnormal -    HENT:   [] Normocephalic, atraumatic.   [] Abnormal   [] Mouth/Throat: Mucous membranes are moist.     External Ears [] Normal  [] Abnormal-     Neck: [] No visualized mass     Pulmonary/Chest: [x] Respiratory effort normal.  [x] No visualized signs of difficulty breathing or respiratory distress        [] Abnormal-      Musculoskeletal:   [] Normal gait with no signs of ataxia         [] Normal range of motion of neck        [] Abnormal-       Neurological:        [x] No

## 2023-04-03 DIAGNOSIS — I25.118 CORONARY ARTERY DISEASE OF NATIVE HEART WITH STABLE ANGINA PECTORIS, UNSPECIFIED VESSEL OR LESION TYPE (HCC): ICD-10-CM

## 2023-04-03 DIAGNOSIS — I10 ESSENTIAL HYPERTENSION: ICD-10-CM

## 2023-04-03 DIAGNOSIS — R00.2 PALPITATION: ICD-10-CM

## 2023-04-03 RX ORDER — METOPROLOL TARTRATE 50 MG/1
TABLET, FILM COATED ORAL
Qty: 180 TABLET | Refills: 0 | Status: SHIPPED | OUTPATIENT
Start: 2023-04-03 | End: 2023-04-03 | Stop reason: SDUPTHER

## 2023-04-03 RX ORDER — METOPROLOL TARTRATE 50 MG/1
TABLET, FILM COATED ORAL
Qty: 90 TABLET | Refills: 0 | Status: SHIPPED | OUTPATIENT
Start: 2023-04-03

## 2023-04-22 DIAGNOSIS — I10 ESSENTIAL HYPERTENSION: ICD-10-CM

## 2023-04-24 RX ORDER — LISINOPRIL AND HYDROCHLOROTHIAZIDE 20; 12.5 MG/1; MG/1
TABLET ORAL
Qty: 30 TABLET | Refills: 0 | Status: SHIPPED | OUTPATIENT
Start: 2023-04-24 | End: 2023-06-01

## 2023-05-20 DIAGNOSIS — I10 ESSENTIAL HYPERTENSION: ICD-10-CM

## 2023-05-22 RX ORDER — AMLODIPINE BESYLATE 10 MG/1
TABLET ORAL
Qty: 30 TABLET | Refills: 0 | Status: SHIPPED | OUTPATIENT
Start: 2023-05-22 | End: 2023-06-20 | Stop reason: SDUPTHER

## 2023-06-01 DIAGNOSIS — I10 ESSENTIAL HYPERTENSION: ICD-10-CM

## 2023-06-01 RX ORDER — LISINOPRIL AND HYDROCHLOROTHIAZIDE 20; 12.5 MG/1; MG/1
TABLET ORAL
Qty: 30 TABLET | Refills: 0 | Status: SHIPPED | OUTPATIENT
Start: 2023-06-01 | End: 2023-06-20 | Stop reason: SDUPTHER

## 2023-06-01 NOTE — TELEPHONE ENCOUNTER
Future Appointments    This patient does not currently have any appointments scheduled.   Past Visits    Date Provider Specialty Visit Type Primary Dx   03/23/2023 Marti Galindo MD Internal Medicine Telemedicine Type 2 diabetes mellitus with diabetic neuropathy, with long-term current use of insulin ([address])

## 2023-06-20 ENCOUNTER — OFFICE VISIT (OUTPATIENT)
Dept: INTERNAL MEDICINE CLINIC | Age: 63
End: 2023-06-20
Payer: MEDICARE

## 2023-06-20 VITALS
HEART RATE: 93 BPM | OXYGEN SATURATION: 96 % | BODY MASS INDEX: 47.8 KG/M2 | WEIGHT: 293 LBS | DIASTOLIC BLOOD PRESSURE: 80 MMHG | SYSTOLIC BLOOD PRESSURE: 126 MMHG

## 2023-06-20 DIAGNOSIS — E66.09 OBESITY DUE TO EXCESS CALORIES, UNSPECIFIED CLASSIFICATION, UNSPECIFIED WHETHER SERIOUS COMORBIDITY PRESENT: ICD-10-CM

## 2023-06-20 DIAGNOSIS — R00.2 PALPITATION: ICD-10-CM

## 2023-06-20 DIAGNOSIS — Z78.0 POST-MENOPAUSAL: ICD-10-CM

## 2023-06-20 DIAGNOSIS — I25.118 CORONARY ARTERY DISEASE OF NATIVE HEART WITH STABLE ANGINA PECTORIS, UNSPECIFIED VESSEL OR LESION TYPE (HCC): ICD-10-CM

## 2023-06-20 DIAGNOSIS — Z23 NEED FOR PROPHYLACTIC VACCINATION AGAINST STREPTOCOCCUS PNEUMONIAE (PNEUMOCOCCUS): ICD-10-CM

## 2023-06-20 DIAGNOSIS — E11.40 TYPE 2 DIABETES MELLITUS WITH DIABETIC NEUROPATHY, WITH LONG-TERM CURRENT USE OF INSULIN (HCC): Primary | ICD-10-CM

## 2023-06-20 DIAGNOSIS — Z12.31 SCREENING MAMMOGRAM FOR BREAST CANCER: ICD-10-CM

## 2023-06-20 DIAGNOSIS — I10 ESSENTIAL HYPERTENSION: ICD-10-CM

## 2023-06-20 DIAGNOSIS — Z79.4 TYPE 2 DIABETES MELLITUS WITH DIABETIC NEUROPATHY, WITH LONG-TERM CURRENT USE OF INSULIN (HCC): Primary | ICD-10-CM

## 2023-06-20 PROCEDURE — 1036F TOBACCO NON-USER: CPT | Performed by: INTERNAL MEDICINE

## 2023-06-20 PROCEDURE — G8427 DOCREV CUR MEDS BY ELIG CLIN: HCPCS | Performed by: INTERNAL MEDICINE

## 2023-06-20 PROCEDURE — 3079F DIAST BP 80-89 MM HG: CPT | Performed by: INTERNAL MEDICINE

## 2023-06-20 PROCEDURE — 99214 OFFICE O/P EST MOD 30 MIN: CPT | Performed by: INTERNAL MEDICINE

## 2023-06-20 PROCEDURE — 1090F PRES/ABSN URINE INCON ASSESS: CPT | Performed by: INTERNAL MEDICINE

## 2023-06-20 PROCEDURE — G8400 PT W/DXA NO RESULTS DOC: HCPCS | Performed by: INTERNAL MEDICINE

## 2023-06-20 PROCEDURE — 3074F SYST BP LT 130 MM HG: CPT | Performed by: INTERNAL MEDICINE

## 2023-06-20 PROCEDURE — G0009 ADMIN PNEUMOCOCCAL VACCINE: HCPCS | Performed by: INTERNAL MEDICINE

## 2023-06-20 PROCEDURE — 90677 PCV20 VACCINE IM: CPT | Performed by: INTERNAL MEDICINE

## 2023-06-20 PROCEDURE — G8417 CALC BMI ABV UP PARAM F/U: HCPCS | Performed by: INTERNAL MEDICINE

## 2023-06-20 PROCEDURE — 2022F DILAT RTA XM EVC RTNOPTHY: CPT | Performed by: INTERNAL MEDICINE

## 2023-06-20 PROCEDURE — 3017F COLORECTAL CA SCREEN DOC REV: CPT | Performed by: INTERNAL MEDICINE

## 2023-06-20 PROCEDURE — 1123F ACP DISCUSS/DSCN MKR DOCD: CPT | Performed by: INTERNAL MEDICINE

## 2023-06-20 PROCEDURE — 3046F HEMOGLOBIN A1C LEVEL >9.0%: CPT | Performed by: INTERNAL MEDICINE

## 2023-06-20 RX ORDER — OMEPRAZOLE 20 MG/1
20 CAPSULE, DELAYED RELEASE ORAL 2 TIMES DAILY PRN
Qty: 180 CAPSULE | Refills: 0 | Status: SHIPPED | OUTPATIENT
Start: 2023-06-20

## 2023-06-20 RX ORDER — METOPROLOL TARTRATE 50 MG/1
TABLET, FILM COATED ORAL
Qty: 90 TABLET | Refills: 1 | Status: SHIPPED | OUTPATIENT
Start: 2023-06-20

## 2023-06-20 RX ORDER — INSULIN LISPRO 200 [IU]/ML
10 INJECTION, SOLUTION SUBCUTANEOUS 3 TIMES DAILY PRN
Qty: 5 ADJUSTABLE DOSE PRE-FILLED PEN SYRINGE | Refills: 2 | Status: SHIPPED | OUTPATIENT
Start: 2023-06-20

## 2023-06-20 RX ORDER — AMLODIPINE BESYLATE 10 MG/1
10 TABLET ORAL DAILY
Qty: 90 TABLET | Refills: 1 | Status: SHIPPED | OUTPATIENT
Start: 2023-06-20

## 2023-06-20 RX ORDER — TIRZEPATIDE 5 MG/.5ML
5 INJECTION, SOLUTION SUBCUTANEOUS WEEKLY
Qty: 4 ADJUSTABLE DOSE PRE-FILLED PEN SYRINGE | Refills: 2 | Status: SHIPPED | OUTPATIENT
Start: 2023-06-20 | End: 2023-06-23

## 2023-06-20 RX ORDER — DULAGLUTIDE 1.5 MG/.5ML
INJECTION, SOLUTION SUBCUTANEOUS
Qty: 4 ADJUSTABLE DOSE PRE-FILLED PEN SYRINGE | Refills: 5 | Status: CANCELLED | OUTPATIENT
Start: 2023-06-20

## 2023-06-20 RX ORDER — LISINOPRIL AND HYDROCHLOROTHIAZIDE 20; 12.5 MG/1; MG/1
1 TABLET ORAL DAILY
Qty: 90 TABLET | Refills: 1 | Status: SHIPPED | OUTPATIENT
Start: 2023-06-20

## 2023-06-20 ASSESSMENT — ENCOUNTER SYMPTOMS
WHEEZING: 0
PHOTOPHOBIA: 0
ABDOMINAL PAIN: 0
SHORTNESS OF BREATH: 0

## 2023-06-20 NOTE — PROGRESS NOTES
Placed This Encounter   Procedures    DEXA Bone Density Axial Skeleton     Standing Status:   Future     Standing Expiration Date:   6/20/2024    Pneumococcal, PCV20, PREVNAR 20, (age 25 yrs+), IM, PF    Microalbumin / creatinine urine ratio     Standing Status:   Future     Standing Expiration Date:   6/20/2024    Lipid Panel     Standing Status:   Future     Standing Expiration Date:   6/20/2024     Order Specific Question:   Is Patient Fasting?/# of Hours     Answer:   no    Hemoglobin A1C     Standing Status:   Future     Standing Expiration Date:   6/20/2024     Current Outpatient Medications   Medication Sig Dispense Refill    amLODIPine (NORVASC) 10 MG tablet Take 1 tablet by mouth daily 90 tablet 1    lisinopril-hydroCHLOROthiazide (PRINZIDE;ZESTORETIC) 20-12.5 MG per tablet Take 1 tablet by mouth daily 90 tablet 1    insulin lispro (HUMALOG KWIKPEN) 200 UNIT/ML SOPN pen Inject 10 Units into the skin 3 times daily as needed for High Blood Sugar (please continue your sliding scale as previously defined by your doctor) 5 Adjustable Dose Pre-filled Pen Syringe 2    omeprazole (PRILOSEC) 20 MG delayed release capsule Take 1 capsule by mouth 2 times daily as needed (BID PRN) 180 capsule 0    metoprolol tartrate (LOPRESSOR) 50 MG tablet Using QD 90 tablet 1    Tirzepatide (MOUNJARO) 5 MG/0.5ML SOPN SC injection Inject 0.5 mLs into the skin once a week 4 Adjustable Dose Pre-filled Pen Syringe 2    nitroGLYCERIN (NITRODUR) 0.4 MG/HR APPLY 1 PATCH ONTO THE SKIN EVERY DAY 90 patch 0    LANTUS SOLOSTAR 100 UNIT/ML injection pen INJECT 54 UNITS INTO THE SKIN EVERY NIGHT (Patient taking differently: 52 Units) 15 Adjustable Dose Pre-filled Pen Syringe 3    albuterol sulfate HFA (PROAIR HFA) 108 (90 Base) MCG/ACT inhaler Inhale 2 puffs into the lungs every 6 hours as needed for Wheezing 1 each 3    nitroGLYCERIN (NITROSTAT) 0.4 MG SL tablet PLACE 1 TABLET UNDER TONGUE EVERY 5 MINUTES AS NEEDED FOR CHEST PAIN.  MAX OF 3

## 2023-06-23 DIAGNOSIS — E11.40 TYPE 2 DIABETES MELLITUS WITH DIABETIC NEUROPATHY, WITH LONG-TERM CURRENT USE OF INSULIN (HCC): Primary | ICD-10-CM

## 2023-06-23 DIAGNOSIS — Z79.4 TYPE 2 DIABETES MELLITUS WITH DIABETIC NEUROPATHY, WITH LONG-TERM CURRENT USE OF INSULIN (HCC): Primary | ICD-10-CM

## 2023-06-28 DIAGNOSIS — E11.40 TYPE 2 DIABETES MELLITUS WITH DIABETIC NEUROPATHY, WITH LONG-TERM CURRENT USE OF INSULIN (HCC): ICD-10-CM

## 2023-06-28 DIAGNOSIS — Z79.4 TYPE 2 DIABETES MELLITUS WITH DIABETIC NEUROPATHY, WITH LONG-TERM CURRENT USE OF INSULIN (HCC): ICD-10-CM

## 2023-06-28 RX ORDER — DULAGLUTIDE 1.5 MG/.5ML
INJECTION, SOLUTION SUBCUTANEOUS
OUTPATIENT
Start: 2023-06-28

## 2023-07-19 ENCOUNTER — TELEPHONE (OUTPATIENT)
Dept: INTERNAL MEDICINE CLINIC | Age: 63
End: 2023-07-19

## 2023-07-19 NOTE — TELEPHONE ENCOUNTER
Pt and her insurance called asking if there was an alternative for her trulicity since it has fallen into the coverage gap.  Please call patient and advise

## 2023-07-20 DIAGNOSIS — E11.40 TYPE 2 DIABETES MELLITUS WITH DIABETIC NEUROPATHY, WITH LONG-TERM CURRENT USE OF INSULIN (HCC): Primary | ICD-10-CM

## 2023-07-20 DIAGNOSIS — Z79.4 TYPE 2 DIABETES MELLITUS WITH DIABETIC NEUROPATHY, WITH LONG-TERM CURRENT USE OF INSULIN (HCC): Primary | ICD-10-CM

## 2023-07-20 RX ORDER — SEMAGLUTIDE 0.68 MG/ML
0.5 INJECTION, SOLUTION SUBCUTANEOUS WEEKLY
Qty: 12 ML | Refills: 2 | Status: SHIPPED | OUTPATIENT
Start: 2023-07-20

## 2023-08-16 DIAGNOSIS — E11.40 TYPE 2 DIABETES MELLITUS WITH DIABETIC NEUROPATHY, WITH LONG-TERM CURRENT USE OF INSULIN (HCC): ICD-10-CM

## 2023-08-16 DIAGNOSIS — Z79.4 TYPE 2 DIABETES MELLITUS WITH DIABETIC NEUROPATHY, WITH LONG-TERM CURRENT USE OF INSULIN (HCC): ICD-10-CM

## 2023-08-17 RX ORDER — INSULIN GLARGINE 100 [IU]/ML
52 INJECTION, SOLUTION SUBCUTANEOUS NIGHTLY
Qty: 5 ADJUSTABLE DOSE PRE-FILLED PEN SYRINGE | Refills: 3 | Status: SHIPPED | OUTPATIENT
Start: 2023-08-17

## 2023-10-11 NOTE — PROGRESS NOTES
score:      Age: 70 years      Sex: Female      Is Non- : Yes      Diabetic: Yes      Tobacco smoker: No      Systolic Blood Pressure: 194 mmHg      Is BP treated: Yes      HDL Cholesterol: 77 mg/dL      Total Cholesterol: 179 mg/dL      Imaging:       ECG (if available, Personally interpreted):        Last Stress (if available):11/3/17    Summary   The patient was stressed using the Lexiscan protocol. The patient was   stressed for 1 minute to a heart rate of 97 BPM. The appropriate amount of   Lexiscan was administered intravenously followed by a nuclear tracer. Test   was stopped due to completion of protocol. Negative for ischemia. Last Cath (if available):11/3/17    CONCLUSION:  1. Diffusely diseased distal LAD. Distal LAD has a 50% narrowing in two  places. There is ALEX grade 3 flow in the distal LAD. Proximal mid LAD,  left main, and circumflex are all normal.  The circumflex is codominant. Right coronary artery is codominant as well and gives rise to small PDA and  is normal.  2.  LV ejection fraction 40%, myles apical and apical hypokinesia of the  left ventricle was present, LVEDP 50 mmHg. 3.  Successful Angio-Seal right femoral arteriotomy. Last TTE/GRACIELA(if available):11/4/17     Summary   Normal left ventricle size and wall thickness. Left ventricular function is   low normal with ejection fraction estimated at 50-55 %. The basal   inferolateral (posterior) wall appears hypokinetic. Diastolic filling   parameters suggests grade I diastolic dysfunction. Trivial mitral regurgitation is present. Assessment / Plan:     1. Essential hypertension    2. Type 2 diabetes mellitus with diabetic neuropathy, with long-term current use of insulin (HCC)    3. Chest pain, unspecified type    4. Coronary artery disease due to lipid rich plaque         Orders Placed This Encounter   Procedures    EKG 12 Lead      ECG WNL. Exertional chest pain with diaphoresis.

## 2023-10-12 ENCOUNTER — OFFICE VISIT (OUTPATIENT)
Dept: CARDIOLOGY CLINIC | Age: 63
End: 2023-10-12

## 2023-10-12 VITALS
WEIGHT: 293 LBS | HEART RATE: 68 BPM | SYSTOLIC BLOOD PRESSURE: 138 MMHG | DIASTOLIC BLOOD PRESSURE: 76 MMHG | BODY MASS INDEX: 48.52 KG/M2

## 2023-10-12 DIAGNOSIS — I10 ESSENTIAL HYPERTENSION: Primary | ICD-10-CM

## 2023-10-12 DIAGNOSIS — R07.9 CHEST PAIN, UNSPECIFIED TYPE: ICD-10-CM

## 2023-10-12 DIAGNOSIS — E11.40 TYPE 2 DIABETES MELLITUS WITH DIABETIC NEUROPATHY, WITH LONG-TERM CURRENT USE OF INSULIN (HCC): ICD-10-CM

## 2023-10-12 DIAGNOSIS — Z79.4 TYPE 2 DIABETES MELLITUS WITH DIABETIC NEUROPATHY, WITH LONG-TERM CURRENT USE OF INSULIN (HCC): ICD-10-CM

## 2023-10-12 DIAGNOSIS — I25.83 CORONARY ARTERY DISEASE DUE TO LIPID RICH PLAQUE: ICD-10-CM

## 2023-10-12 DIAGNOSIS — I25.10 CORONARY ARTERY DISEASE DUE TO LIPID RICH PLAQUE: ICD-10-CM

## 2023-10-19 DIAGNOSIS — Z79.4 TYPE 2 DIABETES MELLITUS WITH DIABETIC NEUROPATHY, WITH LONG-TERM CURRENT USE OF INSULIN (HCC): ICD-10-CM

## 2023-10-19 DIAGNOSIS — E11.40 TYPE 2 DIABETES MELLITUS WITH DIABETIC NEUROPATHY, WITH LONG-TERM CURRENT USE OF INSULIN (HCC): ICD-10-CM

## 2023-10-19 RX ORDER — BLOOD-GLUCOSE METER
1 KIT MISCELLANEOUS 3 TIMES DAILY
Qty: 100 EACH | Refills: 5 | Status: SHIPPED | OUTPATIENT
Start: 2023-10-19

## 2023-11-20 ENCOUNTER — TELEPHONE (OUTPATIENT)
Dept: INTERNAL MEDICINE CLINIC | Age: 63
End: 2023-11-20

## 2023-11-20 ENCOUNTER — OFFICE VISIT (OUTPATIENT)
Dept: INTERNAL MEDICINE CLINIC | Age: 63
End: 2023-11-20
Payer: MEDICARE

## 2023-11-20 VITALS
DIASTOLIC BLOOD PRESSURE: 80 MMHG | BODY MASS INDEX: 49.96 KG/M2 | OXYGEN SATURATION: 98 % | WEIGHT: 293 LBS | HEART RATE: 81 BPM | TEMPERATURE: 98.6 F | SYSTOLIC BLOOD PRESSURE: 132 MMHG

## 2023-11-20 DIAGNOSIS — E11.40 TYPE 2 DIABETES MELLITUS WITH DIABETIC NEUROPATHY, WITH LONG-TERM CURRENT USE OF INSULIN (HCC): ICD-10-CM

## 2023-11-20 DIAGNOSIS — J02.0 STREP PHARYNGITIS: Primary | ICD-10-CM

## 2023-11-20 DIAGNOSIS — I50.22 CHRONIC SYSTOLIC (CONGESTIVE) HEART FAILURE (HCC): ICD-10-CM

## 2023-11-20 DIAGNOSIS — J02.9 SORE THROAT: ICD-10-CM

## 2023-11-20 DIAGNOSIS — Z79.4 TYPE 2 DIABETES MELLITUS WITH DIABETIC NEUROPATHY, WITH LONG-TERM CURRENT USE OF INSULIN (HCC): ICD-10-CM

## 2023-11-20 DIAGNOSIS — F33.42 RECURRENT MAJOR DEPRESSIVE DISORDER, IN FULL REMISSION (HCC): ICD-10-CM

## 2023-11-20 LAB — S PYO AG THROAT QL: POSITIVE

## 2023-11-20 PROCEDURE — 3075F SYST BP GE 130 - 139MM HG: CPT | Performed by: INTERNAL MEDICINE

## 2023-11-20 PROCEDURE — 99214 OFFICE O/P EST MOD 30 MIN: CPT | Performed by: INTERNAL MEDICINE

## 2023-11-20 PROCEDURE — 87880 STREP A ASSAY W/OPTIC: CPT | Performed by: INTERNAL MEDICINE

## 2023-11-20 PROCEDURE — G8484 FLU IMMUNIZE NO ADMIN: HCPCS | Performed by: INTERNAL MEDICINE

## 2023-11-20 PROCEDURE — 3046F HEMOGLOBIN A1C LEVEL >9.0%: CPT | Performed by: INTERNAL MEDICINE

## 2023-11-20 PROCEDURE — 1090F PRES/ABSN URINE INCON ASSESS: CPT | Performed by: INTERNAL MEDICINE

## 2023-11-20 PROCEDURE — 3017F COLORECTAL CA SCREEN DOC REV: CPT | Performed by: INTERNAL MEDICINE

## 2023-11-20 PROCEDURE — 1036F TOBACCO NON-USER: CPT | Performed by: INTERNAL MEDICINE

## 2023-11-20 PROCEDURE — 2022F DILAT RTA XM EVC RTNOPTHY: CPT | Performed by: INTERNAL MEDICINE

## 2023-11-20 PROCEDURE — 3079F DIAST BP 80-89 MM HG: CPT | Performed by: INTERNAL MEDICINE

## 2023-11-20 PROCEDURE — G8417 CALC BMI ABV UP PARAM F/U: HCPCS | Performed by: INTERNAL MEDICINE

## 2023-11-20 PROCEDURE — G8400 PT W/DXA NO RESULTS DOC: HCPCS | Performed by: INTERNAL MEDICINE

## 2023-11-20 PROCEDURE — G8427 DOCREV CUR MEDS BY ELIG CLIN: HCPCS | Performed by: INTERNAL MEDICINE

## 2023-11-20 PROCEDURE — 1123F ACP DISCUSS/DSCN MKR DOCD: CPT | Performed by: INTERNAL MEDICINE

## 2023-11-20 RX ORDER — CLOTRIMAZOLE 1 %
CREAM (GRAM) TOPICAL
Qty: 60 G | Refills: 2 | Status: SHIPPED | OUTPATIENT
Start: 2023-11-20 | End: 2023-11-27

## 2023-11-20 RX ORDER — AMOXICILLIN 500 MG/1
500 CAPSULE ORAL 3 TIMES DAILY
Qty: 30 CAPSULE | Refills: 0 | Status: SHIPPED | OUTPATIENT
Start: 2023-11-20 | End: 2023-11-30

## 2023-11-20 ASSESSMENT — ENCOUNTER SYMPTOMS
VOICE CHANGE: 0
TROUBLE SWALLOWING: 0
ABDOMINAL PAIN: 0
WHEEZING: 0
VOMITING: 0
NAUSEA: 0
COUGH: 1

## 2023-11-20 NOTE — PROGRESS NOTES
Pao Tinoco  1960  female  70 y.o. SUBJECTIVE:       Chief Complaint   Patient presents with    Pharyngitis     Sore throat x a few days. No reported fever. Coughed up blood one time. Cough     Productive cough with phlegm - unsure of color. Some SOB with exertion. Symptoms x a few days. HPI:  Patient has been complaining of sore throat, cough with minimal phlegm for the last 3 days. She got exposed to somebody with positive strep. Patient denies any high fever chills. She does not feel her respiratory symptoms of shortness of breath wheezing has any change from baseline. History of diabetes mellitus. Apparently patient again lost insurance coverage and not getting her injectable Ozempic. She is only using short acting and long-acting insulin. She reports she cannot afford to buy glucose test strips as well. She is going to apply or renew her Medicare insurance very soon. She reported up-to-date on diabetic eye exam.  She has chronic tingling numbness at the lower extremities. Past Medical History:   Diagnosis Date    Anxiety     Arthritis     Asthma     as child    Bilateral chronic knee pain     sees pain management    Degenerative disc disease, cervical MRI 2011    Multilevel cervical degenerative this disease most significant at the C7-T1 level where there is probable impingement of the exiting right C8 nerve root caused by disc herniation. Depression     Diabetes     Macey Hutchins, NP at [facility]    High blood pressure     Hypertriglyceridemia     Lumbar herniated disc MRI 2011    L5-S1    Morbid obesity ([address])     Opiate dependence ([address])     Osteomyelitis ([address])     Pneumonia     as child     Past Surgical History:   Procedure Laterality Date    CARDIAC CATHETERIZATION  11/2017    1. Diffusely diseased distal LAD.   Distal LAD has a 50% narrowing in two ---     Social History     Socioeconomic History    Marital status:     Number of children: 4   Occupational History

## 2023-11-20 NOTE — TELEPHONE ENCOUNTER
[employer] message also sent. Pt may come in tomorrow morning if need be and cannot come in this afternoon - Tuesdays are 's half day - will need to be before 1140 tomorrow.

## 2023-11-20 NOTE — TELEPHONE ENCOUNTER
Patient calling stating she thinks she may have strep throat and is requesting an antibiotic for it.

## 2023-11-21 DIAGNOSIS — E11.40 TYPE 2 DIABETES MELLITUS WITH DIABETIC NEUROPATHY, WITH LONG-TERM CURRENT USE OF INSULIN (HCC): ICD-10-CM

## 2023-11-21 DIAGNOSIS — Z79.4 TYPE 2 DIABETES MELLITUS WITH DIABETIC NEUROPATHY, WITH LONG-TERM CURRENT USE OF INSULIN (HCC): ICD-10-CM

## 2023-11-21 LAB
ALBUMIN SERPL-MCNC: 4.2 G/DL (ref 3.4–5)
ALBUMIN/GLOB SERPL: 1.5 {RATIO} (ref 1.1–2.2)
ALP SERPL-CCNC: 107 U/L (ref 40–129)
ALT SERPL-CCNC: 8 U/L (ref 10–40)
ANION GAP SERPL CALCULATED.3IONS-SCNC: 12 MMOL/L (ref 3–16)
AST SERPL-CCNC: 11 U/L (ref 15–37)
BILIRUB SERPL-MCNC: 0.4 MG/DL (ref 0–1)
BUN SERPL-MCNC: 20 MG/DL (ref 7–20)
CALCIUM SERPL-MCNC: 9.2 MG/DL (ref 8.3–10.6)
CHLORIDE SERPL-SCNC: 100 MMOL/L (ref 99–110)
CO2 SERPL-SCNC: 27 MMOL/L (ref 21–32)
CREAT SERPL-MCNC: 1.1 MG/DL (ref 0.6–1.2)
DEPRECATED RDW RBC AUTO: 14.1 % (ref 12.4–15.4)
EST. AVERAGE GLUCOSE BLD GHB EST-MCNC: 211.6 MG/DL
GFR SERPLBLD CREATININE-BSD FMLA CKD-EPI: 56 ML/MIN/{1.73_M2}
GLUCOSE SERPL-MCNC: 255 MG/DL (ref 70–99)
HBA1C MFR BLD: 9 %
HCT VFR BLD AUTO: 35 % (ref 36–48)
HGB BLD-MCNC: 12 G/DL (ref 12–16)
MCH RBC QN AUTO: 30.6 PG (ref 26–34)
MCHC RBC AUTO-ENTMCNC: 34.2 G/DL (ref 31–36)
MCV RBC AUTO: 89.6 FL (ref 80–100)
PLATELET # BLD AUTO: 288 K/UL (ref 135–450)
PMV BLD AUTO: 8.7 FL (ref 5–10.5)
POTASSIUM SERPL-SCNC: 4.4 MMOL/L (ref 3.5–5.1)
PROT SERPL-MCNC: 7 G/DL (ref 6.4–8.2)
RBC # BLD AUTO: 3.91 M/UL (ref 4–5.2)
SODIUM SERPL-SCNC: 139 MMOL/L (ref 136–145)
WBC # BLD AUTO: 9.1 K/UL (ref 4–11)

## 2023-11-21 RX ORDER — SEMAGLUTIDE 0.68 MG/ML
0.5 INJECTION, SOLUTION SUBCUTANEOUS WEEKLY
Qty: 12 ML | Refills: 2 | Status: SHIPPED | OUTPATIENT
Start: 2023-11-21

## 2023-11-21 NOTE — RESULT ENCOUNTER NOTE
Patient diabetes is out of control again.  She need to go back to [facility].  Please advise her to monitor blood glucose 3 times daily and make follow-up appointment after 4 weeks with blood sugar reading.  If necessary please give patient financial assistance program info.

## 2023-12-12 ENCOUNTER — TELEPHONE (OUTPATIENT)
Dept: INTERNAL MEDICINE CLINIC | Age: 63
End: 2023-12-12

## 2023-12-12 ENCOUNTER — OFFICE VISIT (OUTPATIENT)
Dept: INTERNAL MEDICINE CLINIC | Age: 63
End: 2023-12-12
Payer: COMMERCIAL

## 2023-12-12 VITALS — SYSTOLIC BLOOD PRESSURE: 162 MMHG | HEART RATE: 98 BPM | OXYGEN SATURATION: 98 % | DIASTOLIC BLOOD PRESSURE: 100 MMHG

## 2023-12-12 DIAGNOSIS — J02.9 PHARYNGITIS, UNSPECIFIED ETIOLOGY: Primary | ICD-10-CM

## 2023-12-12 DIAGNOSIS — I10 ESSENTIAL HYPERTENSION: ICD-10-CM

## 2023-12-12 PROCEDURE — 1123F ACP DISCUSS/DSCN MKR DOCD: CPT | Performed by: INTERNAL MEDICINE

## 2023-12-12 PROCEDURE — G8427 DOCREV CUR MEDS BY ELIG CLIN: HCPCS | Performed by: INTERNAL MEDICINE

## 2023-12-12 PROCEDURE — 99213 OFFICE O/P EST LOW 20 MIN: CPT | Performed by: INTERNAL MEDICINE

## 2023-12-12 PROCEDURE — 3017F COLORECTAL CA SCREEN DOC REV: CPT | Performed by: INTERNAL MEDICINE

## 2023-12-12 PROCEDURE — 87880 STREP A ASSAY W/OPTIC: CPT | Performed by: INTERNAL MEDICINE

## 2023-12-12 PROCEDURE — 1036F TOBACCO NON-USER: CPT | Performed by: INTERNAL MEDICINE

## 2023-12-12 PROCEDURE — 1090F PRES/ABSN URINE INCON ASSESS: CPT | Performed by: INTERNAL MEDICINE

## 2023-12-12 PROCEDURE — G8484 FLU IMMUNIZE NO ADMIN: HCPCS | Performed by: INTERNAL MEDICINE

## 2023-12-12 PROCEDURE — 3077F SYST BP >= 140 MM HG: CPT | Performed by: INTERNAL MEDICINE

## 2023-12-12 PROCEDURE — 3080F DIAST BP >= 90 MM HG: CPT | Performed by: INTERNAL MEDICINE

## 2023-12-12 PROCEDURE — G8400 PT W/DXA NO RESULTS DOC: HCPCS | Performed by: INTERNAL MEDICINE

## 2023-12-12 PROCEDURE — G8417 CALC BMI ABV UP PARAM F/U: HCPCS | Performed by: INTERNAL MEDICINE

## 2023-12-12 RX ORDER — AMOXICILLIN AND CLAVULANATE POTASSIUM 875; 125 MG/1; MG/1
1 TABLET, FILM COATED ORAL 2 TIMES DAILY
Qty: 14 TABLET | Refills: 0 | Status: SHIPPED | OUTPATIENT
Start: 2023-12-12 | End: 2023-12-19

## 2023-12-12 ASSESSMENT — ENCOUNTER SYMPTOMS
SHORTNESS OF BREATH: 0
WHEEZING: 0
COUGH: 1
ABDOMINAL PAIN: 0
TROUBLE SWALLOWING: 1
SORE THROAT: 1

## 2023-12-12 NOTE — TELEPHONE ENCOUNTER
Pt called in regards to still having a sore throat. Pt currently does not have insurance and is not able to pay for another visit. Pt wanting to know if something else could be sent in. Please advise and call Pt if any questions or concerns. Pt uses [facility] on [address].

## 2023-12-12 NOTE — ASSESSMENT & PLAN NOTE
blood pressure readings elevated this visit, recheck slightly better but remained higher than target goal, advised patient to avoid decongestants and over-the-counter blood pressure medications, maintain healthy low-salt diet and increase level of physical activity as tolerated.   She does have a follow-up appointment with Dr. Acharya Persons her regular provider in 1 week and will make recommendations on medication adjustment if blood pressure remains elevated

## 2023-12-12 NOTE — ASSESSMENT & PLAN NOTE
patient with hyperemia of the throat, repeat strep antigen testing is negative, advised since relapse in symptoms very similar to onset of strep 3 weeks ago may had partially treated strep pharyngitis will do another week of Augmentin twice a day, advised to take after a meal to lessen GI side effects since had nausea and vomiting with amoxicillin 3 times daily last time.   Encouraged to also increase water intake and ensure adequate hydration, can gargle with salt water and use Tylenol to help with painful swallowing

## 2023-12-12 NOTE — PROGRESS NOTES
ASSESSMENT/PLAN:  1. Pharyngitis, unspecified etiology  Assessment & Plan:    patient with hyperemia of the throat, repeat strep antigen testing is negative, advised since relapse in symptoms very similar to onset of strep 3 weeks ago may had partially treated strep pharyngitis will do another week of Augmentin twice a day, advised to take after a meal to lessen GI side effects since had nausea and vomiting with amoxicillin 3 times daily last time. Encouraged to also increase water intake and ensure adequate hydration, can gargle with salt water and use Tylenol to help with painful swallowing  Orders:  -     amoxicillin-clavulanate (AUGMENTIN) 875-125 MG per tablet; Take 1 tablet by mouth 2 times daily for 7 days, Disp-14 tablet, R-0Normal  -     POCT rapid strep A  2. Essential hypertension  Assessment & Plan:    blood pressure readings elevated this visit, recheck slightly better but remained higher than target goal, advised patient to avoid decongestants and over-the-counter blood pressure medications, maintain healthy low-salt diet and increase level of physical activity as tolerated. She does have a follow-up appointment with Dr. Emily Nguyen her regular provider in 1 week and will make recommendations on medication adjustment if blood pressure remains elevated      Return for as scheduled. SUBJECTIVE  HPI:   Patient here for acute visit, reports she was diagnosed with strep throat November 20 and was treated with amoxicillin for 10 days. Reports she did feel better and felt symptoms completely resolved however for the past 3 to 4 days started experiencing sore throat again and painful swallowing again similar to what she had 3 weeks ago. She continues to deny fever or chills, reports she does have mild cough productive of yellowish sputum but otherwise denies chest congestion. Mostly odynophagia and sore throat    Pharyngitis  This is a recurrent problem. The current episode started in the past 7 days.  The

## 2023-12-12 NOTE — TELEPHONE ENCOUNTER
Acute visit scheduled for today with covering provider. Financial info was sent to patient via [employer] message last month - pt did not view that message. Pt will view that message and call for info. Pt will be billed self pay today.

## 2023-12-31 DIAGNOSIS — I10 ESSENTIAL HYPERTENSION: ICD-10-CM

## 2024-01-02 RX ORDER — LISINOPRIL AND HYDROCHLOROTHIAZIDE 20; 12.5 MG/1; MG/1
1 TABLET ORAL DAILY
Qty: 90 TABLET | Refills: 0 | Status: ON HOLD | OUTPATIENT
Start: 2024-01-02

## 2024-01-05 DIAGNOSIS — I10 ESSENTIAL HYPERTENSION: ICD-10-CM

## 2024-01-05 RX ORDER — AMLODIPINE BESYLATE 10 MG/1
10 TABLET ORAL DAILY
Qty: 90 TABLET | Refills: 1 | Status: ON HOLD | OUTPATIENT
Start: 2024-01-05

## 2024-01-06 ENCOUNTER — APPOINTMENT (OUTPATIENT)
Dept: CT IMAGING | Age: 64
DRG: 322 | End: 2024-01-06
Payer: MEDICARE

## 2024-01-06 ENCOUNTER — APPOINTMENT (OUTPATIENT)
Dept: GENERAL RADIOLOGY | Age: 64
DRG: 322 | End: 2024-01-06
Payer: MEDICARE

## 2024-01-06 ENCOUNTER — HOSPITAL ENCOUNTER (INPATIENT)
Age: 64
LOS: 3 days | Discharge: HOME | DRG: 322 | End: 2024-01-09
Attending: EMERGENCY MEDICINE | Admitting: STUDENT IN AN ORGANIZED HEALTH CARE EDUCATION/TRAINING PROGRAM
Payer: MEDICARE

## 2024-01-06 DIAGNOSIS — I10 ACCELERATED HYPERTENSION: ICD-10-CM

## 2024-01-06 DIAGNOSIS — E11.10 DIABETIC KETOACIDOSIS WITHOUT COMA ASSOCIATED WITH TYPE 2 DIABETES MELLITUS (HCC): ICD-10-CM

## 2024-01-06 DIAGNOSIS — Z79.4 TYPE 2 DIABETES MELLITUS WITH DIABETIC NEUROPATHY, WITH LONG-TERM CURRENT USE OF INSULIN (HCC): ICD-10-CM

## 2024-01-06 DIAGNOSIS — E11.40 TYPE 2 DIABETES MELLITUS WITH DIABETIC NEUROPATHY, WITH LONG-TERM CURRENT USE OF INSULIN (HCC): ICD-10-CM

## 2024-01-06 DIAGNOSIS — I24.9 ACUTE CORONARY SYNDROME (HCC): Primary | ICD-10-CM

## 2024-01-06 DIAGNOSIS — R73.9 HYPERGLYCEMIA: ICD-10-CM

## 2024-01-06 PROBLEM — I21.4 NSTEMI (NON-ST ELEVATED MYOCARDIAL INFARCTION) (HCC): Status: ACTIVE | Noted: 2024-01-06

## 2024-01-06 LAB
ALBUMIN SERPL-MCNC: 4.3 G/DL (ref 3.4–5)
ALBUMIN/GLOB SERPL: 1.2 {RATIO} (ref 1.1–2.2)
ALP SERPL-CCNC: 115 U/L (ref 40–129)
ALT SERPL-CCNC: 7 U/L (ref 10–40)
ANION GAP SERPL CALCULATED.3IONS-SCNC: 11 MMOL/L (ref 3–16)
ANTI-XA UNFRAC HEPARIN: 0.1 IU/ML (ref 0.3–0.7)
ANTI-XA UNFRAC HEPARIN: 0.44 IU/ML (ref 0.3–0.7)
ANTI-XA UNFRAC HEPARIN: <0.1 IU/ML (ref 0.3–0.7)
APTT BLD: 23.2 SEC (ref 22.7–35.9)
AST SERPL-CCNC: 15 U/L (ref 15–37)
BASOPHILS # BLD: 0 K/UL (ref 0–0.2)
BASOPHILS NFR BLD: 0.5 %
BILIRUB SERPL-MCNC: 0.3 MG/DL (ref 0–1)
BUN SERPL-MCNC: 25 MG/DL (ref 7–20)
CALCIUM SERPL-MCNC: 9.3 MG/DL (ref 8.3–10.6)
CHLORIDE SERPL-SCNC: 101 MMOL/L (ref 99–110)
CO2 SERPL-SCNC: 26 MMOL/L (ref 21–32)
CREAT SERPL-MCNC: 1.1 MG/DL (ref 0.6–1.2)
DEPRECATED RDW RBC AUTO: 14.1 % (ref 12.4–15.4)
EKG ATRIAL RATE: 107 BPM
EKG DIAGNOSIS: NORMAL
EKG P AXIS: 62 DEGREES
EKG P-R INTERVAL: 168 MS
EKG Q-T INTERVAL: 350 MS
EKG QRS DURATION: 84 MS
EKG QTC CALCULATION (BAZETT): 467 MS
EKG R AXIS: -13 DEGREES
EKG T AXIS: 48 DEGREES
EKG VENTRICULAR RATE: 107 BPM
EOSINOPHIL # BLD: 0.2 K/UL (ref 0–0.6)
EOSINOPHIL NFR BLD: 2.9 %
GFR SERPLBLD CREATININE-BSD FMLA CKD-EPI: 55 ML/MIN/{1.73_M2}
GLUCOSE BLD-MCNC: 203 MG/DL (ref 70–99)
GLUCOSE BLD-MCNC: 330 MG/DL (ref 70–99)
GLUCOSE BLD-MCNC: 368 MG/DL (ref 70–99)
GLUCOSE BLD-MCNC: 88 MG/DL (ref 70–99)
GLUCOSE SERPL-MCNC: 332 MG/DL (ref 70–99)
HCT VFR BLD AUTO: 33.6 % (ref 36–48)
HGB BLD-MCNC: 11.9 G/DL (ref 12–16)
INR PPP: 0.96 (ref 0.84–1.16)
LYMPHOCYTES # BLD: 4 K/UL (ref 1–5.1)
LYMPHOCYTES NFR BLD: 48.6 %
MCH RBC QN AUTO: 30.6 PG (ref 26–34)
MCHC RBC AUTO-ENTMCNC: 35.4 G/DL (ref 31–36)
MCV RBC AUTO: 86.6 FL (ref 80–100)
MONOCYTES # BLD: 0.6 K/UL (ref 0–1.3)
MONOCYTES NFR BLD: 7.3 %
NEUTROPHILS # BLD: 3.3 K/UL (ref 1.7–7.7)
NEUTROPHILS NFR BLD: 40.7 %
NT-PROBNP SERPL-MCNC: 53 PG/ML (ref 0–124)
PERFORMED ON: ABNORMAL
PERFORMED ON: NORMAL
PLATELET # BLD AUTO: 312 K/UL (ref 135–450)
PMV BLD AUTO: 7.9 FL (ref 5–10.5)
POTASSIUM SERPL-SCNC: 3.7 MMOL/L (ref 3.5–5.1)
PROT SERPL-MCNC: 7.8 G/DL (ref 6.4–8.2)
PROTHROMBIN TIME: 12.8 SEC (ref 11.5–14.8)
RBC # BLD AUTO: 3.88 M/UL (ref 4–5.2)
SODIUM SERPL-SCNC: 138 MMOL/L (ref 136–145)
TROPONIN, HIGH SENSITIVITY: 12 NG/L (ref 0–14)
TROPONIN, HIGH SENSITIVITY: 130 NG/L (ref 0–14)
TROPONIN, HIGH SENSITIVITY: 307 NG/L (ref 0–14)
TROPONIN, HIGH SENSITIVITY: 364 NG/L (ref 0–14)
TROPONIN, HIGH SENSITIVITY: 364 NG/L (ref 0–14)
TROPONIN, HIGH SENSITIVITY: 40 NG/L (ref 0–14)
WBC # BLD AUTO: 8.2 K/UL (ref 4–11)

## 2024-01-06 PROCEDURE — 6370000000 HC RX 637 (ALT 250 FOR IP): Performed by: STUDENT IN AN ORGANIZED HEALTH CARE EDUCATION/TRAINING PROGRAM

## 2024-01-06 PROCEDURE — 85610 PROTHROMBIN TIME: CPT

## 2024-01-06 PROCEDURE — 6360000004 HC RX CONTRAST MEDICATION: Performed by: INTERNAL MEDICINE

## 2024-01-06 PROCEDURE — 93005 ELECTROCARDIOGRAM TRACING: CPT | Performed by: STUDENT IN AN ORGANIZED HEALTH CARE EDUCATION/TRAINING PROGRAM

## 2024-01-06 PROCEDURE — 85730 THROMBOPLASTIN TIME PARTIAL: CPT

## 2024-01-06 PROCEDURE — 99291 CRITICAL CARE FIRST HOUR: CPT | Performed by: INTERNAL MEDICINE

## 2024-01-06 PROCEDURE — 83880 ASSAY OF NATRIURETIC PEPTIDE: CPT

## 2024-01-06 PROCEDURE — C8929 TTE W OR WO FOL WCON,DOPPLER: HCPCS

## 2024-01-06 PROCEDURE — 6360000002 HC RX W HCPCS: Performed by: EMERGENCY MEDICINE

## 2024-01-06 PROCEDURE — 93005 ELECTROCARDIOGRAM TRACING: CPT | Performed by: INTERNAL MEDICINE

## 2024-01-06 PROCEDURE — 80053 COMPREHEN METABOLIC PANEL: CPT

## 2024-01-06 PROCEDURE — 6360000002 HC RX W HCPCS

## 2024-01-06 PROCEDURE — 93005 ELECTROCARDIOGRAM TRACING: CPT | Performed by: EMERGENCY MEDICINE

## 2024-01-06 PROCEDURE — 96375 TX/PRO/DX INJ NEW DRUG ADDON: CPT

## 2024-01-06 PROCEDURE — 6370000000 HC RX 637 (ALT 250 FOR IP): Performed by: FAMILY MEDICINE

## 2024-01-06 PROCEDURE — 6360000002 HC RX W HCPCS: Performed by: STUDENT IN AN ORGANIZED HEALTH CARE EDUCATION/TRAINING PROGRAM

## 2024-01-06 PROCEDURE — 2580000003 HC RX 258: Performed by: STUDENT IN AN ORGANIZED HEALTH CARE EDUCATION/TRAINING PROGRAM

## 2024-01-06 PROCEDURE — 85025 COMPLETE CBC W/AUTO DIFF WBC: CPT

## 2024-01-06 PROCEDURE — 99285 EMERGENCY DEPT VISIT HI MDM: CPT

## 2024-01-06 PROCEDURE — 2500000003 HC RX 250 WO HCPCS

## 2024-01-06 PROCEDURE — 2500000003 HC RX 250 WO HCPCS: Performed by: INTERNAL MEDICINE

## 2024-01-06 PROCEDURE — 71045 X-RAY EXAM CHEST 1 VIEW: CPT

## 2024-01-06 PROCEDURE — 6360000004 HC RX CONTRAST MEDICATION: Performed by: EMERGENCY MEDICINE

## 2024-01-06 PROCEDURE — 96374 THER/PROPH/DIAG INJ IV PUSH: CPT

## 2024-01-06 PROCEDURE — 6370000000 HC RX 637 (ALT 250 FOR IP): Performed by: EMERGENCY MEDICINE

## 2024-01-06 PROCEDURE — 2000000000 HC ICU R&B

## 2024-01-06 PROCEDURE — 71275 CT ANGIOGRAPHY CHEST: CPT

## 2024-01-06 PROCEDURE — 93010 ELECTROCARDIOGRAM REPORT: CPT | Performed by: INTERNAL MEDICINE

## 2024-01-06 PROCEDURE — 84484 ASSAY OF TROPONIN QUANT: CPT

## 2024-01-06 PROCEDURE — 85520 HEPARIN ASSAY: CPT

## 2024-01-06 PROCEDURE — 36415 COLL VENOUS BLD VENIPUNCTURE: CPT

## 2024-01-06 RX ORDER — INSULIN GLARGINE 100 [IU]/ML
52 INJECTION, SOLUTION SUBCUTANEOUS NIGHTLY
Status: DISCONTINUED | OUTPATIENT
Start: 2024-01-06 | End: 2024-01-07

## 2024-01-06 RX ORDER — METOPROLOL TARTRATE 1 MG/ML
5 INJECTION, SOLUTION INTRAVENOUS ONCE
Status: COMPLETED | OUTPATIENT
Start: 2024-01-06 | End: 2024-01-06

## 2024-01-06 RX ORDER — INSULIN LISPRO 100 [IU]/ML
8 INJECTION, SOLUTION INTRAVENOUS; SUBCUTANEOUS ONCE
Status: COMPLETED | OUTPATIENT
Start: 2024-01-06 | End: 2024-01-06

## 2024-01-06 RX ORDER — METOPROLOL TARTRATE 50 MG/1
50 TABLET, FILM COATED ORAL 2 TIMES DAILY
Status: DISCONTINUED | OUTPATIENT
Start: 2024-01-06 | End: 2024-01-08 | Stop reason: ALTCHOICE

## 2024-01-06 RX ORDER — INSULIN LISPRO 100 [IU]/ML
10 INJECTION, SOLUTION INTRAVENOUS; SUBCUTANEOUS
Status: DISCONTINUED | OUTPATIENT
Start: 2024-01-06 | End: 2024-01-06

## 2024-01-06 RX ORDER — MAGNESIUM SULFATE IN WATER 40 MG/ML
2000 INJECTION, SOLUTION INTRAVENOUS PRN
Status: DISCONTINUED | OUTPATIENT
Start: 2024-01-06 | End: 2024-01-09 | Stop reason: HOSPADM

## 2024-01-06 RX ORDER — POLYETHYLENE GLYCOL 3350 17 G/17G
17 POWDER, FOR SOLUTION ORAL DAILY PRN
Status: DISCONTINUED | OUTPATIENT
Start: 2024-01-06 | End: 2024-01-09 | Stop reason: HOSPADM

## 2024-01-06 RX ORDER — HEPARIN SODIUM 10000 [USP'U]/100ML
5-30 INJECTION, SOLUTION INTRAVENOUS CONTINUOUS
Status: DISCONTINUED | OUTPATIENT
Start: 2024-01-06 | End: 2024-01-08

## 2024-01-06 RX ORDER — OXYCODONE AND ACETAMINOPHEN 10; 325 MG/1; MG/1
1 TABLET ORAL EVERY 4 HOURS PRN
Status: DISCONTINUED | OUTPATIENT
Start: 2024-01-06 | End: 2024-01-09 | Stop reason: HOSPADM

## 2024-01-06 RX ORDER — ONDANSETRON 2 MG/ML
4 INJECTION INTRAMUSCULAR; INTRAVENOUS
Status: DISCONTINUED | OUTPATIENT
Start: 2024-01-06 | End: 2024-01-09 | Stop reason: HOSPADM

## 2024-01-06 RX ORDER — ACETAMINOPHEN 325 MG/1
650 TABLET ORAL EVERY 6 HOURS PRN
Status: DISCONTINUED | OUTPATIENT
Start: 2024-01-06 | End: 2024-01-08 | Stop reason: DRUGHIGH

## 2024-01-06 RX ORDER — INSULIN LISPRO 100 [IU]/ML
0-4 INJECTION, SOLUTION INTRAVENOUS; SUBCUTANEOUS NIGHTLY
Status: DISCONTINUED | OUTPATIENT
Start: 2024-01-06 | End: 2024-01-07

## 2024-01-06 RX ORDER — POTASSIUM CHLORIDE 20 MEQ/1
40 TABLET, EXTENDED RELEASE ORAL PRN
Status: DISCONTINUED | OUTPATIENT
Start: 2024-01-06 | End: 2024-01-09 | Stop reason: HOSPADM

## 2024-01-06 RX ORDER — ASPIRIN 81 MG/1
81 TABLET, CHEWABLE ORAL DAILY
Status: DISCONTINUED | OUTPATIENT
Start: 2024-01-07 | End: 2024-01-09 | Stop reason: HOSPADM

## 2024-01-06 RX ORDER — LABETALOL HYDROCHLORIDE 5 MG/ML
10 INJECTION, SOLUTION INTRAVENOUS ONCE
Status: COMPLETED | OUTPATIENT
Start: 2024-01-06 | End: 2024-01-06

## 2024-01-06 RX ORDER — SODIUM CHLORIDE 0.9 % (FLUSH) 0.9 %
5-40 SYRINGE (ML) INJECTION EVERY 12 HOURS SCHEDULED
Status: DISCONTINUED | OUTPATIENT
Start: 2024-01-06 | End: 2024-01-09 | Stop reason: HOSPADM

## 2024-01-06 RX ORDER — HEPARIN SODIUM 1000 [USP'U]/ML
4000 INJECTION, SOLUTION INTRAVENOUS; SUBCUTANEOUS ONCE
Status: COMPLETED | OUTPATIENT
Start: 2024-01-06 | End: 2024-01-06

## 2024-01-06 RX ORDER — ATORVASTATIN CALCIUM 80 MG/1
80 TABLET, FILM COATED ORAL NIGHTLY
Status: DISCONTINUED | OUTPATIENT
Start: 2024-01-06 | End: 2024-01-09 | Stop reason: HOSPADM

## 2024-01-06 RX ORDER — SODIUM CHLORIDE 9 MG/ML
INJECTION, SOLUTION INTRAVENOUS PRN
Status: DISCONTINUED | OUTPATIENT
Start: 2024-01-06 | End: 2024-01-09 | Stop reason: HOSPADM

## 2024-01-06 RX ORDER — MORPHINE SULFATE 4 MG/ML
4 INJECTION, SOLUTION INTRAMUSCULAR; INTRAVENOUS
Status: COMPLETED | OUTPATIENT
Start: 2024-01-06 | End: 2024-01-06

## 2024-01-06 RX ORDER — ALBUTEROL SULFATE 90 UG/1
2 AEROSOL, METERED RESPIRATORY (INHALATION) EVERY 6 HOURS PRN
Status: DISCONTINUED | OUTPATIENT
Start: 2024-01-06 | End: 2024-01-09 | Stop reason: HOSPADM

## 2024-01-06 RX ORDER — INSULIN LISPRO 100 [IU]/ML
10 INJECTION, SOLUTION INTRAVENOUS; SUBCUTANEOUS
Status: DISCONTINUED | OUTPATIENT
Start: 2024-01-06 | End: 2024-01-07

## 2024-01-06 RX ORDER — NITROGLYCERIN 20 MG/100ML
5-200 INJECTION INTRAVENOUS CONTINUOUS
Status: DISCONTINUED | OUTPATIENT
Start: 2024-01-06 | End: 2024-01-06

## 2024-01-06 RX ORDER — HEPARIN SODIUM 1000 [USP'U]/ML
4000 INJECTION, SOLUTION INTRAVENOUS; SUBCUTANEOUS PRN
Status: DISCONTINUED | OUTPATIENT
Start: 2024-01-06 | End: 2024-01-09

## 2024-01-06 RX ORDER — GABAPENTIN 100 MG/1
300 CAPSULE ORAL 3 TIMES DAILY
Status: DISCONTINUED | OUTPATIENT
Start: 2024-01-06 | End: 2024-01-09 | Stop reason: HOSPADM

## 2024-01-06 RX ORDER — GLUCAGON 1 MG/ML
1 KIT INJECTION PRN
Status: DISCONTINUED | OUTPATIENT
Start: 2024-01-06 | End: 2024-01-09 | Stop reason: HOSPADM

## 2024-01-06 RX ORDER — INSULIN LISPRO 100 [IU]/ML
0-8 INJECTION, SOLUTION INTRAVENOUS; SUBCUTANEOUS
Status: DISCONTINUED | OUTPATIENT
Start: 2024-01-06 | End: 2024-01-07

## 2024-01-06 RX ORDER — DEXTROSE MONOHYDRATE 100 MG/ML
INJECTION, SOLUTION INTRAVENOUS CONTINUOUS PRN
Status: DISCONTINUED | OUTPATIENT
Start: 2024-01-06 | End: 2024-01-09 | Stop reason: HOSPADM

## 2024-01-06 RX ORDER — SODIUM CHLORIDE 0.9 % (FLUSH) 0.9 %
5-40 SYRINGE (ML) INJECTION PRN
Status: DISCONTINUED | OUTPATIENT
Start: 2024-01-06 | End: 2024-01-09 | Stop reason: HOSPADM

## 2024-01-06 RX ORDER — NITROGLYCERIN 0.4 MG/1
0.4 TABLET SUBLINGUAL ONCE
Status: COMPLETED | OUTPATIENT
Start: 2024-01-06 | End: 2024-01-06

## 2024-01-06 RX ORDER — NITROGLYCERIN 20 MG/100ML
5-200 INJECTION INTRAVENOUS CONTINUOUS
Status: DISCONTINUED | OUTPATIENT
Start: 2024-01-06 | End: 2024-01-08

## 2024-01-06 RX ORDER — PANTOPRAZOLE SODIUM 40 MG/1
40 TABLET, DELAYED RELEASE ORAL
Status: DISCONTINUED | OUTPATIENT
Start: 2024-01-07 | End: 2024-01-09 | Stop reason: HOSPADM

## 2024-01-06 RX ORDER — HEPARIN SODIUM 1000 [USP'U]/ML
2000 INJECTION, SOLUTION INTRAVENOUS; SUBCUTANEOUS PRN
Status: DISCONTINUED | OUTPATIENT
Start: 2024-01-06 | End: 2024-01-09

## 2024-01-06 RX ORDER — POTASSIUM CHLORIDE 7.45 MG/ML
10 INJECTION INTRAVENOUS PRN
Status: DISCONTINUED | OUTPATIENT
Start: 2024-01-06 | End: 2024-01-09 | Stop reason: HOSPADM

## 2024-01-06 RX ORDER — ACETAMINOPHEN 650 MG/1
650 SUPPOSITORY RECTAL EVERY 6 HOURS PRN
Status: DISCONTINUED | OUTPATIENT
Start: 2024-01-06 | End: 2024-01-09 | Stop reason: HOSPADM

## 2024-01-06 RX ADMIN — HEPARIN SODIUM 2000 UNITS: 1000 INJECTION INTRAVENOUS; SUBCUTANEOUS at 16:46

## 2024-01-06 RX ADMIN — METOPROLOL TARTRATE 5 MG: 5 INJECTION INTRAVENOUS at 11:33

## 2024-01-06 RX ADMIN — NITROGLYCERIN 30 MCG/MIN: 20 INJECTION INTRAVENOUS at 15:51

## 2024-01-06 RX ADMIN — ATORVASTATIN CALCIUM 80 MG: 80 TABLET, FILM COATED ORAL at 20:38

## 2024-01-06 RX ADMIN — METOPROLOL TARTRATE 50 MG: 50 TABLET ORAL at 22:04

## 2024-01-06 RX ADMIN — INSULIN LISPRO 8 UNITS: 100 INJECTION, SOLUTION INTRAVENOUS; SUBCUTANEOUS at 11:51

## 2024-01-06 RX ADMIN — LABETALOL HYDROCHLORIDE 10 MG: 5 INJECTION INTRAVENOUS at 07:18

## 2024-01-06 RX ADMIN — MORPHINE SULFATE 4 MG: 4 INJECTION, SOLUTION INTRAMUSCULAR; INTRAVENOUS at 07:03

## 2024-01-06 RX ADMIN — NITROGLYCERIN 10 MCG/MIN: 20 INJECTION INTRAVENOUS at 12:33

## 2024-01-06 RX ADMIN — PERFLUTREN 1.5 ML: 6.52 INJECTION, SUSPENSION INTRAVENOUS at 11:04

## 2024-01-06 RX ADMIN — INSULIN LISPRO 8 UNITS: 100 INJECTION, SOLUTION INTRAVENOUS; SUBCUTANEOUS at 14:08

## 2024-01-06 RX ADMIN — OXYCODONE AND ACETAMINOPHEN 1 TABLET: 10; 325 TABLET ORAL at 20:32

## 2024-01-06 RX ADMIN — ONDANSETRON 4 MG: 2 INJECTION INTRAMUSCULAR; INTRAVENOUS at 07:03

## 2024-01-06 RX ADMIN — MORPHINE SULFATE 4 MG: 4 INJECTION, SOLUTION INTRAMUSCULAR; INTRAVENOUS at 14:46

## 2024-01-06 RX ADMIN — INSULIN LISPRO 2 UNITS: 100 INJECTION, SOLUTION INTRAVENOUS; SUBCUTANEOUS at 16:14

## 2024-01-06 RX ADMIN — IOPAMIDOL 75 ML: 755 INJECTION, SOLUTION INTRAVENOUS at 07:35

## 2024-01-06 RX ADMIN — NITROGLYCERIN 0.4 MG: 0.4 TABLET, ORALLY DISINTEGRATING SUBLINGUAL at 07:18

## 2024-01-06 RX ADMIN — HEPARIN SODIUM 4000 UNITS: 1000 INJECTION INTRAVENOUS; SUBCUTANEOUS at 09:13

## 2024-01-06 RX ADMIN — GABAPENTIN 300 MG: 100 CAPSULE ORAL at 20:32

## 2024-01-06 RX ADMIN — SODIUM CHLORIDE, PRESERVATIVE FREE 10 ML: 5 INJECTION INTRAVENOUS at 20:37

## 2024-01-06 RX ADMIN — INSULIN LISPRO 10 UNITS: 100 INJECTION, SOLUTION INTRAVENOUS; SUBCUTANEOUS at 15:37

## 2024-01-06 RX ADMIN — NITROGLYCERIN 5 MCG/MIN: 20 INJECTION INTRAVENOUS at 09:18

## 2024-01-06 RX ADMIN — HEPARIN SODIUM 6 UNITS/KG/HR: 10000 INJECTION, SOLUTION INTRAVENOUS at 09:16

## 2024-01-06 ASSESSMENT — PAIN DESCRIPTION - PAIN TYPE
TYPE: CHRONIC PAIN
TYPE: CHRONIC PAIN

## 2024-01-06 ASSESSMENT — PAIN DESCRIPTION - DESCRIPTORS
DESCRIPTORS: DISCOMFORT
DESCRIPTORS: ACHING
DESCRIPTORS: SQUEEZING
DESCRIPTORS: ACHING

## 2024-01-06 ASSESSMENT — PAIN SCALES - GENERAL
PAINLEVEL_OUTOF10: 10
PAINLEVEL_OUTOF10: 4
PAINLEVEL_OUTOF10: 9
PAINLEVEL_OUTOF10: 10
PAINLEVEL_OUTOF10: 1
PAINLEVEL_OUTOF10: 4

## 2024-01-06 ASSESSMENT — PAIN DESCRIPTION - LOCATION
LOCATION: KNEE
LOCATION: CHEST
LOCATION: CHEST;STERNUM
LOCATION: KNEE

## 2024-01-06 ASSESSMENT — PAIN DESCRIPTION - ORIENTATION
ORIENTATION: MID
ORIENTATION: RIGHT
ORIENTATION: RIGHT
ORIENTATION: MID

## 2024-01-06 ASSESSMENT — PAIN - FUNCTIONAL ASSESSMENT
PAIN_FUNCTIONAL_ASSESSMENT: PREVENTS OR INTERFERES SOME ACTIVE ACTIVITIES AND ADLS
PAIN_FUNCTIONAL_ASSESSMENT: ACTIVITIES ARE NOT PREVENTED
PAIN_FUNCTIONAL_ASSESSMENT: 0-10

## 2024-01-06 ASSESSMENT — PAIN DESCRIPTION - FREQUENCY: FREQUENCY: CONTINUOUS

## 2024-01-06 ASSESSMENT — PAIN DESCRIPTION - ONSET: ONSET: ON-GOING

## 2024-01-06 NOTE — PROGRESS NOTES
Admitted from ED on nitro and heparin gtt.  Handoff at bedside with ED RN Rylee.  Placed on CVU monitoring oriented to the room and unit.  Daughter at bedside.  Call light in reach, bed locked in low position.

## 2024-01-06 NOTE — H&P
Follow package directions for low blood sugar. 9/13/18   ANNE-MARIE Bird MD   aspirin 81 MG chewable tablet TAKE 1 TABLET BY MOUTH DAILY  Patient taking differently: Take 1 tablet by mouth daily Report taking 325mg 2/5/18   ANNE-MARIE Bird MD   UNABLE TO FIND Glucometer #1, E11.9, testing TID. ON INSULIN.  Home pt.  DISPENSE METER BEST COVERED BY INSURANCE 10/13/17   ANNE-MARIE Bird MD   Lancet Devices (ACCU-CHEK SOFTCLIX LANCET DEV) MISC 1 Units by Does not apply route 4 times daily (before meals and nightly) 5/4/15   Jailyn Lou MD   SOFT TOUCH LANCETS MISC 1 strip by Does not apply route 4 times daily 5/4/15   Jailyn Lou MD       Physical Exam:    Physical Exam     General: NAD, obese  Eyes: EOMI  ENT: neck supple  Cardiovascular: Regular rate.  Respiratory: Clear to auscultation  Gastrointestinal: Soft, non tender  Genitourinary: no suprapubic tenderness  Musculoskeletal: No edema  Skin: warm, dry  Neuro: Alert.  Psych: Mood appropriate.       Past Medical History:   PMHx   Past Medical History:   Diagnosis Date    Anxiety     Arthritis     Asthma     as child    Bilateral chronic knee pain     sees pain management    Degenerative disc disease, cervical MRI 2011    Multilevel cervical degenerative this disease most significant at the C7-T1 level where there is probable impingement of the exiting right C8 nerve root caused by disc herniation.    Depression     Diabetes     Amira Espinosa, NP at     High blood pressure     Hypertriglyceridemia     Lumbar herniated disc MRI 2011    L5-S1    Morbid obesity (HCC)     Opiate dependence (HCC)     Osteomyelitis (HCC)     Pneumonia     as child     PSHX:  has a past surgical history that includes Cardiac catheterization (11/2017).  Allergies:   Allergies   Allergen Reactions    Zolpidem Tartrate [Zolpidem Tartrate Er] Other (See Comments)     Jittery feeling    Diphenhydramine Other (See Comments)     Jittery feeling     Fam HX:  family history

## 2024-01-06 NOTE — PROGRESS NOTES
Notified cardiology of troponin and 4/10 squeezing CP.  Gave pt PRN morphine, increase nitro gtt.  Stat EKG.

## 2024-01-06 NOTE — RT PROTOCOL NOTE
RT Inhaler-Nebulizer Bronchodilator Protocol Note    There is a bronchodilator order in the chart from a provider indicating to follow the RT Bronchodilator Protocol and there is an “Initiate RT Inhaler-Nebulizer Bronchodilator Protocol” order as well (see protocol at bottom of note).    CXR Findings:  XR CHEST PORTABLE    Result Date: 1/6/2024  Under penetrated study demonstrating perihilar airspace changes centrally which may represent vascular congestion.  An underlying viral infection cannot be excluded.       The findings from the last RT Protocol Assessment were as follows:   History Pulmonary Disease: Chronic pulmonary disease  Respiratory Pattern: Regular pattern and RR 12-20 bpm  Breath Sounds: Clear breath sounds  Cough: Strong, spontaneous, non-productive  Indication for Bronchodilator Therapy: On home bronchodilators (PRN @ home.)  Bronchodilator Assessment Score: 2    Aerosolized bronchodilator medication orders have been revised according to the RT Inhaler-Nebulizer Bronchodilator Protocol below.    Respiratory Therapist to perform RT Therapy Protocol Assessment initially then follow the protocol.  Repeat RT Therapy Protocol Assessment PRN for score 0-3 or on second treatment, BID, and PRN for scores above 3.    No Indications - adjust the frequency to every 6 hours PRN wheezing or bronchospasm, if no treatments needed after 48 hours then discontinue using Per Protocol order mode.     If indication present, adjust the RT bronchodilator orders based on the Bronchodilator Assessment Score as indicated below.  Use Inhaler orders unless patient has one or more of the following: on home nebulizer, not able to hold breath for 10 seconds, is not alert and oriented, cannot activate and use MDI correctly, or respiratory rate 25 breaths per minute or more, then use the equivalent nebulizer order(s) with same Frequency and PRN reasons based on the score.  If a patient is on this medication at home then do not

## 2024-01-06 NOTE — ED NOTES
Patient oriented to room and ED throughput process.  Safety measures with ED bed locked in lowest position and call light in reach.  Patient educated on all orders, including any medications.  Patient educated on chief complaint/symptoms. Patient encouraged to ask questions regarding care, medications or treatment plan.  Patient aware of how to reach staff with questions/concerns.

## 2024-01-06 NOTE — ED PROVIDER NOTES
mmHg.  3.  Successful Angio-Seal right femoral arteriotomy.\"    No other complaints, modifying factors or associated symptoms.     I have reviewed the following from the nursing documentation.    Past Medical History:   Diagnosis Date    Anxiety     Arthritis     Asthma     as child    Bilateral chronic knee pain     sees pain management    Degenerative disc disease, cervical MRI 2011    Multilevel cervical degenerative this disease most significant at the C7-T1 level where there is probable impingement of the exiting right C8 nerve root caused by disc herniation.    Depression     Diabetes     Amira Espinosa, NP at     High blood pressure     Hypertriglyceridemia     Lumbar herniated disc MRI 2011    L5-S1    Morbid obesity (HCC)     Opiate dependence (HCC)     Osteomyelitis (HCC)     Pneumonia     as child     Past Surgical History:   Procedure Laterality Date    CARDIAC CATHETERIZATION  11/2017    1.  Diffusely diseased distal LAD.  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

## 2024-01-06 NOTE — ED NOTES
Report given to Cierra RN in CVICU, transported pt to floor on life juan jose monitor by this RN.

## 2024-01-07 LAB
ANION GAP SERPL CALCULATED.3IONS-SCNC: 8 MMOL/L (ref 3–16)
ANTI-XA UNFRAC HEPARIN: 0.34 IU/ML (ref 0.3–0.7)
BUN SERPL-MCNC: 21 MG/DL (ref 7–20)
CALCIUM SERPL-MCNC: 8.8 MG/DL (ref 8.3–10.6)
CHLORIDE SERPL-SCNC: 102 MMOL/L (ref 99–110)
CO2 SERPL-SCNC: 27 MMOL/L (ref 21–32)
CREAT SERPL-MCNC: 1 MG/DL (ref 0.6–1.2)
DEPRECATED RDW RBC AUTO: 14.1 % (ref 12.4–15.4)
EKG ATRIAL RATE: 81 BPM
EKG ATRIAL RATE: 86 BPM
EKG DIAGNOSIS: NORMAL
EKG DIAGNOSIS: NORMAL
EKG P AXIS: 40 DEGREES
EKG P AXIS: 67 DEGREES
EKG P-R INTERVAL: 130 MS
EKG P-R INTERVAL: 138 MS
EKG Q-T INTERVAL: 392 MS
EKG Q-T INTERVAL: 400 MS
EKG QRS DURATION: 102 MS
EKG QRS DURATION: 88 MS
EKG QTC CALCULATION (BAZETT): 465 MS
EKG QTC CALCULATION (BAZETT): 469 MS
EKG R AXIS: -11 DEGREES
EKG R AXIS: -14 DEGREES
EKG T AXIS: 176 DEGREES
EKG T AXIS: 40 DEGREES
EKG VENTRICULAR RATE: 81 BPM
EKG VENTRICULAR RATE: 86 BPM
FERRITIN SERPL IA-MCNC: 206.1 NG/ML (ref 15–150)
FOLATE SERPL-MCNC: 14.83 NG/ML (ref 4.78–24.2)
GFR SERPLBLD CREATININE-BSD FMLA CKD-EPI: >60 ML/MIN/{1.73_M2}
GLUCOSE BLD-MCNC: 101 MG/DL (ref 70–99)
GLUCOSE BLD-MCNC: 181 MG/DL (ref 70–99)
GLUCOSE BLD-MCNC: 205 MG/DL (ref 70–99)
GLUCOSE BLD-MCNC: 236 MG/DL (ref 70–99)
GLUCOSE SERPL-MCNC: 193 MG/DL (ref 70–99)
HCT VFR BLD AUTO: 30 % (ref 36–48)
HGB BLD-MCNC: 10.6 G/DL (ref 12–16)
IRON SATN MFR SERPL: 55 % (ref 15–50)
IRON SERPL-MCNC: 139 UG/DL (ref 37–145)
MCH RBC QN AUTO: 30.4 PG (ref 26–34)
MCHC RBC AUTO-ENTMCNC: 35.2 G/DL (ref 31–36)
MCV RBC AUTO: 86.2 FL (ref 80–100)
PERFORMED ON: ABNORMAL
PLATELET # BLD AUTO: 270 K/UL (ref 135–450)
PMV BLD AUTO: 7.8 FL (ref 5–10.5)
POTASSIUM SERPL-SCNC: 3.7 MMOL/L (ref 3.5–5.1)
RBC # BLD AUTO: 3.48 M/UL (ref 4–5.2)
SODIUM SERPL-SCNC: 137 MMOL/L (ref 136–145)
TIBC SERPL-MCNC: 254 UG/DL (ref 260–445)
TROPONIN, HIGH SENSITIVITY: 257 NG/L (ref 0–14)
TROPONIN, HIGH SENSITIVITY: 319 NG/L (ref 0–14)
VIT B12 SERPL-MCNC: 655 PG/ML (ref 211–911)
WBC # BLD AUTO: 6.8 K/UL (ref 4–11)

## 2024-01-07 PROCEDURE — 2580000003 HC RX 258: Performed by: STUDENT IN AN ORGANIZED HEALTH CARE EDUCATION/TRAINING PROGRAM

## 2024-01-07 PROCEDURE — 83540 ASSAY OF IRON: CPT

## 2024-01-07 PROCEDURE — 2000000000 HC ICU R&B

## 2024-01-07 PROCEDURE — 82607 VITAMIN B-12: CPT

## 2024-01-07 PROCEDURE — 6370000000 HC RX 637 (ALT 250 FOR IP): Performed by: FAMILY MEDICINE

## 2024-01-07 PROCEDURE — 82746 ASSAY OF FOLIC ACID SERUM: CPT

## 2024-01-07 PROCEDURE — 99233 SBSQ HOSP IP/OBS HIGH 50: CPT | Performed by: INTERNAL MEDICINE

## 2024-01-07 PROCEDURE — 83550 IRON BINDING TEST: CPT

## 2024-01-07 PROCEDURE — 82728 ASSAY OF FERRITIN: CPT

## 2024-01-07 PROCEDURE — 80048 BASIC METABOLIC PNL TOTAL CA: CPT

## 2024-01-07 PROCEDURE — 93010 ELECTROCARDIOGRAM REPORT: CPT | Performed by: INTERNAL MEDICINE

## 2024-01-07 PROCEDURE — 85520 HEPARIN ASSAY: CPT

## 2024-01-07 PROCEDURE — 84484 ASSAY OF TROPONIN QUANT: CPT

## 2024-01-07 PROCEDURE — 6370000000 HC RX 637 (ALT 250 FOR IP): Performed by: STUDENT IN AN ORGANIZED HEALTH CARE EDUCATION/TRAINING PROGRAM

## 2024-01-07 PROCEDURE — 85027 COMPLETE CBC AUTOMATED: CPT

## 2024-01-07 RX ORDER — INSULIN LISPRO 100 [IU]/ML
0-4 INJECTION, SOLUTION INTRAVENOUS; SUBCUTANEOUS NIGHTLY
Status: DISCONTINUED | OUTPATIENT
Start: 2024-01-07 | End: 2024-01-09 | Stop reason: HOSPADM

## 2024-01-07 RX ORDER — INSULIN LISPRO 100 [IU]/ML
0-16 INJECTION, SOLUTION INTRAVENOUS; SUBCUTANEOUS
Status: DISCONTINUED | OUTPATIENT
Start: 2024-01-07 | End: 2024-01-09 | Stop reason: HOSPADM

## 2024-01-07 RX ORDER — INSULIN LISPRO 100 [IU]/ML
10 INJECTION, SOLUTION INTRAVENOUS; SUBCUTANEOUS
Status: DISCONTINUED | OUTPATIENT
Start: 2024-01-07 | End: 2024-01-09 | Stop reason: HOSPADM

## 2024-01-07 RX ORDER — ALBUTEROL SULFATE 2.5 MG/3ML
2.5 SOLUTION RESPIRATORY (INHALATION) EVERY 4 HOURS PRN
Status: DISCONTINUED | OUTPATIENT
Start: 2024-01-07 | End: 2024-01-09 | Stop reason: HOSPADM

## 2024-01-07 RX ORDER — INSULIN GLARGINE 100 [IU]/ML
26 INJECTION, SOLUTION SUBCUTANEOUS NIGHTLY
Status: DISCONTINUED | OUTPATIENT
Start: 2024-01-07 | End: 2024-01-08

## 2024-01-07 RX ADMIN — INSULIN LISPRO 10 UNITS: 100 INJECTION, SOLUTION INTRAVENOUS; SUBCUTANEOUS at 08:56

## 2024-01-07 RX ADMIN — GABAPENTIN 300 MG: 100 CAPSULE ORAL at 08:55

## 2024-01-07 RX ADMIN — GABAPENTIN 300 MG: 100 CAPSULE ORAL at 14:11

## 2024-01-07 RX ADMIN — SODIUM CHLORIDE, PRESERVATIVE FREE 10 ML: 5 INJECTION INTRAVENOUS at 08:56

## 2024-01-07 RX ADMIN — INSULIN LISPRO 10 UNITS: 100 INJECTION, SOLUTION INTRAVENOUS; SUBCUTANEOUS at 15:48

## 2024-01-07 RX ADMIN — OXYCODONE AND ACETAMINOPHEN 1 TABLET: 10; 325 TABLET ORAL at 02:27

## 2024-01-07 RX ADMIN — INSULIN LISPRO 2 UNITS: 100 INJECTION, SOLUTION INTRAVENOUS; SUBCUTANEOUS at 08:56

## 2024-01-07 RX ADMIN — METOPROLOL TARTRATE 50 MG: 50 TABLET ORAL at 21:38

## 2024-01-07 RX ADMIN — ATORVASTATIN CALCIUM 80 MG: 80 TABLET, FILM COATED ORAL at 21:38

## 2024-01-07 RX ADMIN — PANTOPRAZOLE SODIUM 40 MG: 40 TABLET, DELAYED RELEASE ORAL at 08:55

## 2024-01-07 RX ADMIN — GABAPENTIN 300 MG: 100 CAPSULE ORAL at 21:38

## 2024-01-07 RX ADMIN — INSULIN GLARGINE 26 UNITS: 100 INJECTION, SOLUTION SUBCUTANEOUS at 21:44

## 2024-01-07 RX ADMIN — OXYCODONE AND ACETAMINOPHEN 1 TABLET: 10; 325 TABLET ORAL at 21:45

## 2024-01-07 RX ADMIN — ASPIRIN 81 MG 81 MG: 81 TABLET ORAL at 08:55

## 2024-01-07 RX ADMIN — INSULIN LISPRO 10 UNITS: 100 INJECTION, SOLUTION INTRAVENOUS; SUBCUTANEOUS at 11:43

## 2024-01-07 RX ADMIN — SODIUM CHLORIDE, PRESERVATIVE FREE 10 ML: 5 INJECTION INTRAVENOUS at 21:39

## 2024-01-07 RX ADMIN — METOPROLOL TARTRATE 50 MG: 50 TABLET ORAL at 08:55

## 2024-01-07 RX ADMIN — OXYCODONE AND ACETAMINOPHEN 1 TABLET: 10; 325 TABLET ORAL at 14:10

## 2024-01-07 ASSESSMENT — PAIN DESCRIPTION - LOCATION
LOCATION: KNEE
LOCATION: LEG
LOCATION: KNEE
LOCATION: KNEE

## 2024-01-07 ASSESSMENT — PAIN SCALES - GENERAL
PAINLEVEL_OUTOF10: 6
PAINLEVEL_OUTOF10: 9
PAINLEVEL_OUTOF10: 9
PAINLEVEL_OUTOF10: 5
PAINLEVEL_OUTOF10: 9

## 2024-01-07 ASSESSMENT — PAIN DESCRIPTION - ORIENTATION
ORIENTATION: RIGHT

## 2024-01-07 ASSESSMENT — PAIN DESCRIPTION - DESCRIPTORS
DESCRIPTORS: ACHING
DESCRIPTORS: DISCOMFORT
DESCRIPTORS: ACHING
DESCRIPTORS: DISCOMFORT

## 2024-01-07 ASSESSMENT — PAIN - FUNCTIONAL ASSESSMENT
PAIN_FUNCTIONAL_ASSESSMENT: ACTIVITIES ARE NOT PREVENTED
PAIN_FUNCTIONAL_ASSESSMENT: ACTIVITIES ARE NOT PREVENTED

## 2024-01-07 ASSESSMENT — PAIN DESCRIPTION - PAIN TYPE: TYPE: CHRONIC PAIN

## 2024-01-07 ASSESSMENT — PAIN DESCRIPTION - ONSET: ONSET: ON-GOING

## 2024-01-07 ASSESSMENT — PAIN DESCRIPTION - FREQUENCY: FREQUENCY: CONTINUOUS

## 2024-01-07 NOTE — RT PROTOCOL NOTE
RT Nebulizer Bronchodilator Protocol Note    There is a bronchodilator order in the chart from a provider indicating to follow the RT Bronchodilator Protocol and there is an “Initiate RT Bronchodilator Protocol” order as well (see protocol at bottom of note).    CXR Findings:  XR CHEST PORTABLE    Result Date: 1/6/2024  Under penetrated study demonstrating perihilar airspace changes centrally which may represent vascular congestion.  An underlying viral infection cannot be excluded.       The findings from the last RT Protocol Assessment were as follows:  Smoking: Chronic pulmonary disease  Respiratory Pattern: Regular pattern and RR 12-20 bpm  Breath Sounds: Clear breath sounds  Cough: Strong, spontaneous, non-productive  Indication for Bronchodilator Therapy: On home bronchodilators (PRN @ home.)  Bronchodilator Assessment Score: 2    Aerosolized bronchodilator medication orders have been revised according to the RT Nebulizer Bronchodilator Protocol below.    Respiratory Therapist to perform RT Therapy Protocol Assessment initially then follow the protocol.  Repeat RT Therapy Protocol Assessment PRN for score 0-3 or on second treatment, BID, and PRN for scores above 3.    No Indications - adjust the frequency to every 6 hours PRN wheezing or bronchospasm, if no treatments needed after 48 hours then discontinue using Per Protocol order mode.     If indication present, adjust the RT bronchodilator orders based on the Bronchodilator Assessment Score as indicated below.  If a patient is on this medication at home then do not decrease Frequency below that used at home.    0-3 - enter or revise RT bronchodilator order(s) to equivalent RT Bronchodilator order with Frequency of every 4 hours PRN for wheezing or increased work of breathing using Per Protocol order mode.       4-6 - enter or revise RT Bronchodilator order(s) to two equivalent RT bronchodilator orders with one order with BID Frequency and one order with

## 2024-01-07 NOTE — CARE COORDINATION
Case Management Assessment  Initial Evaluation    Date/Time of Evaluation: 1/7/2024 9:25 AM  Assessment Completed by: Jimbo Garza Jr, RN    If patient is discharged prior to next notation, then this note serves as note for discharge by case management.    Patient Name: Belkis Morton                   YOB: 1960  Diagnosis: Accelerated hypertension [I10]  Acute coronary syndrome (HCC) [I24.9]  Hyperglycemia [R73.9]  NSTEMI (non-ST elevated myocardial infarction) (HCC) [I21.4]                   Date / Time: 1/6/2024  6:41 AM    Patient Admission Status: Inpatient   Readmission Risk (Low < 19, Mod (19-27), High > 27): Readmission Risk Score: 11    Current PCP: ANNE-MARIE Bird MD  PCP verified by CM? (P) Yes    Chart Reviewed: Yes      History Provided by: (P) Patient  Patient Orientation: (P) Alert and Oriented    Patient Cognition: (P) Alert    Hospitalization in the last 30 days (Readmission):  No    If yes, Readmission Assessment in  Navigator will be completed.    Advance Directives:      Code Status: Full Code   Patient's Primary Decision Maker is: (P) Legal Next of Kin    Primary Decision Maker: Grace Morton - Child - [phone]    Secondary Decision Maker: Steve Morton - Child - [phone]    Secondary Decision Maker: Benjamin Morton - Child - [phone]    Discharge Planning:    Patient lives with: (P) Alone Type of Home: (P) House  Primary Care Giver: (P) Self  Patient Support Systems include: (P) Family Members   Current Financial resources: (P) Medicare  Current community resources: (P) None  Current services prior to admission: (P) None            Current DME:              Type of Home Care services:  (P) None    ADLS  Prior functional level: (P) Independent in ADLs/IADLs  Current functional level: (P) Independent in ADLs/IADLs    PT AM-PAC:   /24  OT AM-PAC:   /24    Family can provide assistance at DC: (P) Yes  Would you like Case Management to discuss the discharge plan

## 2024-01-07 NOTE — PROGRESS NOTES
[facility]     Daily Progress Note      Admit Date:  1/6/2024    ASSESSMENT AND PLAN:  NSTEMI versus coronary spasm versus Takutsobo  CAD in native artery, nonobstructive on last cath  CAD risk factors: DM, HTN, HLP    Plan  -Troponins peaked at 364 yesterday  -Remains pain-free  -Continue heparin, aspirin, beta blocker, nitro drip  -Monitor telemetry  -Although this could be Takutsobo, need to definitively exclude severe epicardial CAD.  NSTEMI until proven otherwise  -Cath in am    I discussed risks, benefits, and alternatives of heart cath and possible PCI to the patient. I discussed risks, incluiding risk of bleeding, infection, kidney injury (possibly requiring dialysis), vascular injury, arrhythmia, myocardial infarction, stroke, and even death, and the patient would like to proceed with heart cath.      -Albuterol per protocol for mild asthma    Subjective:  Ms. Morton feels markedly better than before.  No chest pain.  Still a little winded.    Objective:   /67   Pulse 79   Temp 98.2 °F (36.8 °C) (Temporal)   Resp 20   Ht 1.702 m (5' 7\")   Wt (!) 144.1 kg (317 lb 10.9 oz)   SpO2 97%   BMI 49.76 kg/m²     Intake/Output Summary (Last 24 hours) at 1/7/2024 0826  Last data filed at 1/7/2024 0640  Gross per 24 hour   Intake 905.95 ml   Output 700 ml   Net 205.95 ml       TELEMETRY: Sinus     Physical Exam:  General:  Awake, alert, oriented x 3, NAD  Skin:  Warm and dry  Neck:  JVD none  Chest:  normal air entry  Cardiovascular:  RRR S1S2, no S3, no murmur  Abdomen:  Soft, ND, NT, No HSM  Extremities:  No edema    Medications:    insulin glargine  52 Units SubCUTAneous Nightly    metoprolol tartrate  50 mg Oral BID    pantoprazole  40 mg Oral QAM AC    sodium chloride flush  5-40 mL IntraVENous 2 times per day    aspirin  81 mg Oral Daily    atorvastatin  80 mg Oral Nightly    insulin lispro  0-8 Units SubCUTAneous TID WC    insulin lispro  0-4 Units SubCUTAneous Nightly    insulin lispro

## 2024-01-07 NOTE — PROGRESS NOTES
Patient reported that she had recently lost her glucometer.  She explained that she would \"smell her urine for sugar\" to decide the amount of insulin to give herself. Explained best practices, educated pt on diet, and disease processes. Patient's blood sugar 88 Dr. Mares made aware.  MD gave order to hole Lantus.  Reviewed home medications with patient and let the doctor know, orders received so patient could take medications she was taking at home.

## 2024-01-07 NOTE — PROGRESS NOTES
V2.0    [facility]      Name:  Belkis Morton /Age/Sex: 1960  (63 y.o. female)   MRN & CSN:  [id number] & [id number] Encounter Date/Time: 2024 2:46 PM EST   Location:  [facility] PCP: ANNE-MARIE Bird MD     Attending:Lien Smith MD       Hospital Day: 2    Assessment and Recommendations   Belkis Morton is a 63 y.o. female with pmh of diabetes mellitus, hypertension, hyperlipidemia, CHFrEF  who presents with NSTEMI (non-ST elevated myocardial infarction) (HCC)      Plan:   #Chest pain:  #NSTEMI:  -Patient presented with chief complaint of chest pain.  -In the ED, patient was tachycardic and hypertensive.  -EKG showed no ST elevation.  -Initial troponin was 12 and repeat was 40 and 130.  -Heparin drip  -Echo was done and showed EF of fraction of 50%. Akinesis of the distal inferior and distal anteroseptal walls as well as the apex.  -Continue statin, Aspirin, Metoprolol  -Nitro drip  -Cardiology consulted  -Cath tomorrow morning     # Hypertensive urgency:  -Patient presented with blood pressure of 191/92.  -Nitro drip  -Metoprolol     # Diabetes mellitus:  -Lantus 52 units nightly  -Lispro 10 units 3 times daily  -Hypoglycemic protocol     # CHFrEF:  -Strict in and out  -Daily weight     # Hyperlipidemia:  -Continue home statin        Diet ADULT DIET; Regular; 3 carb choices (45 gm/meal)  Diet NPO Exceptions are: Sips of Water with Meds   DVT Prophylaxis [] Lovenox, [x]  Heparin, [] SCDs, [] Ambulation,  [] Eliquis, [] Xarelto  [] Coumadin   Code Status Full Code   Disposition From: Home  Expected Disposition: Home  Estimated Date of Discharge: 2 days  Patient requires continued admission due to NSTEMI.    Surrogate Decision Maker/ POA  Daughter     Personally reviewed Lab Studies and Imaging     Discussed management of the case with cardiology who recommended cath tomorrow morning.    EKG interpreted personally and results no ST elevation.     Imaging that was interpreted

## 2024-01-08 DIAGNOSIS — I25.83 CORONARY ARTERY DISEASE DUE TO LIPID RICH PLAQUE: Primary | ICD-10-CM

## 2024-01-08 DIAGNOSIS — I25.10 CORONARY ARTERY DISEASE DUE TO LIPID RICH PLAQUE: Primary | ICD-10-CM

## 2024-01-08 LAB
ANION GAP SERPL CALCULATED.3IONS-SCNC: 9 MMOL/L (ref 3–16)
ANTI-XA UNFRAC HEPARIN: 0.91 IU/ML (ref 0.3–0.7)
BUN SERPL-MCNC: 19 MG/DL (ref 7–20)
CALCIUM SERPL-MCNC: 9.4 MG/DL (ref 8.3–10.6)
CHLORIDE SERPL-SCNC: 107 MMOL/L (ref 99–110)
CO2 SERPL-SCNC: 28 MMOL/L (ref 21–32)
CREAT SERPL-MCNC: 0.9 MG/DL (ref 0.6–1.2)
DEPRECATED RDW RBC AUTO: 14.3 % (ref 12.4–15.4)
GFR SERPLBLD CREATININE-BSD FMLA CKD-EPI: >60 ML/MIN/{1.73_M2}
GLUCOSE BLD-MCNC: 333 MG/DL (ref 70–99)
GLUCOSE SERPL-MCNC: 196 MG/DL (ref 70–99)
HCT VFR BLD AUTO: 31.1 % (ref 36–48)
HGB BLD-MCNC: 10.6 G/DL (ref 12–16)
LEFT VENTRICULAR EJECTION FRACTION MODE: NORMAL
LV EF: 50 %
MCH RBC QN AUTO: 29.9 PG (ref 26–34)
MCHC RBC AUTO-ENTMCNC: 34.3 G/DL (ref 31–36)
MCV RBC AUTO: 87.3 FL (ref 80–100)
PERFORMED ON: ABNORMAL
PLATELET # BLD AUTO: 262 K/UL (ref 135–450)
PMV BLD AUTO: 7.4 FL (ref 5–10.5)
POTASSIUM SERPL-SCNC: 4.9 MMOL/L (ref 3.5–5.1)
RBC # BLD AUTO: 3.56 M/UL (ref 4–5.2)
SODIUM SERPL-SCNC: 144 MMOL/L (ref 136–145)
WBC # BLD AUTO: 7.3 K/UL (ref 4–11)

## 2024-01-08 PROCEDURE — 92928 PRQ TCAT PLMT NTRAC ST 1 LES: CPT | Performed by: INTERNAL MEDICINE

## 2024-01-08 PROCEDURE — B2151ZZ FLUOROSCOPY OF LEFT HEART USING LOW OSMOLAR CONTRAST: ICD-10-PCS | Performed by: INTERNAL MEDICINE

## 2024-01-08 PROCEDURE — 93458 L HRT ARTERY/VENTRICLE ANGIO: CPT

## 2024-01-08 PROCEDURE — 93005 ELECTROCARDIOGRAM TRACING: CPT | Performed by: INTERNAL MEDICINE

## 2024-01-08 PROCEDURE — 83036 HEMOGLOBIN GLYCOSYLATED A1C: CPT

## 2024-01-08 PROCEDURE — 36415 COLL VENOUS BLD VENIPUNCTURE: CPT

## 2024-01-08 PROCEDURE — C1894 INTRO/SHEATH, NON-LASER: HCPCS

## 2024-01-08 PROCEDURE — 99232 SBSQ HOSP IP/OBS MODERATE 35: CPT | Performed by: INTERNAL MEDICINE

## 2024-01-08 PROCEDURE — 4A023N7 MEASUREMENT OF CARDIAC SAMPLING AND PRESSURE, LEFT HEART, PERCUTANEOUS APPROACH: ICD-10-PCS | Performed by: INTERNAL MEDICINE

## 2024-01-08 PROCEDURE — 99152 MOD SED SAME PHYS/QHP 5/>YRS: CPT

## 2024-01-08 PROCEDURE — 85027 COMPLETE CBC AUTOMATED: CPT

## 2024-01-08 PROCEDURE — C1887 CATHETER, GUIDING: HCPCS

## 2024-01-08 PROCEDURE — 85520 HEPARIN ASSAY: CPT

## 2024-01-08 PROCEDURE — C1725 CATH, TRANSLUMIN NON-LASER: HCPCS

## 2024-01-08 PROCEDURE — 99153 MOD SED SAME PHYS/QHP EA: CPT

## 2024-01-08 PROCEDURE — 93458 L HRT ARTERY/VENTRICLE ANGIO: CPT | Performed by: INTERNAL MEDICINE

## 2024-01-08 PROCEDURE — 6360000002 HC RX W HCPCS: Performed by: STUDENT IN AN ORGANIZED HEALTH CARE EDUCATION/TRAINING PROGRAM

## 2024-01-08 PROCEDURE — 6370000000 HC RX 637 (ALT 250 FOR IP)

## 2024-01-08 PROCEDURE — C1874 STENT, COATED/COV W/DEL SYS: HCPCS

## 2024-01-08 PROCEDURE — 6360000004 HC RX CONTRAST MEDICATION: Performed by: INTERNAL MEDICINE

## 2024-01-08 PROCEDURE — 2500000003 HC RX 250 WO HCPCS

## 2024-01-08 PROCEDURE — 6370000000 HC RX 637 (ALT 250 FOR IP): Performed by: STUDENT IN AN ORGANIZED HEALTH CARE EDUCATION/TRAINING PROGRAM

## 2024-01-08 PROCEDURE — 99152 MOD SED SAME PHYS/QHP 5/>YRS: CPT | Performed by: INTERNAL MEDICINE

## 2024-01-08 PROCEDURE — 6360000002 HC RX W HCPCS

## 2024-01-08 PROCEDURE — 2709999900 HC NON-CHARGEABLE SUPPLY

## 2024-01-08 PROCEDURE — 80048 BASIC METABOLIC PNL TOTAL CA: CPT

## 2024-01-08 PROCEDURE — 027034Z DILATION OF CORONARY ARTERY, ONE ARTERY WITH DRUG-ELUTING INTRALUMINAL DEVICE, PERCUTANEOUS APPROACH: ICD-10-PCS | Performed by: INTERNAL MEDICINE

## 2024-01-08 PROCEDURE — 2580000003 HC RX 258: Performed by: INTERNAL MEDICINE

## 2024-01-08 PROCEDURE — 85347 COAGULATION TIME ACTIVATED: CPT

## 2024-01-08 PROCEDURE — 6360000002 HC RX W HCPCS: Performed by: INTERNAL MEDICINE

## 2024-01-08 PROCEDURE — C1769 GUIDE WIRE: HCPCS

## 2024-01-08 PROCEDURE — C9600 PERC DRUG-EL COR STENT SING: HCPCS

## 2024-01-08 PROCEDURE — 6370000000 HC RX 637 (ALT 250 FOR IP): Performed by: INTERNAL MEDICINE

## 2024-01-08 PROCEDURE — 6370000000 HC RX 637 (ALT 250 FOR IP): Performed by: FAMILY MEDICINE

## 2024-01-08 PROCEDURE — B2111ZZ FLUOROSCOPY OF MULTIPLE CORONARY ARTERIES USING LOW OSMOLAR CONTRAST: ICD-10-PCS | Performed by: INTERNAL MEDICINE

## 2024-01-08 PROCEDURE — 2000000000 HC ICU R&B

## 2024-01-08 RX ORDER — SODIUM CHLORIDE 0.9 % (FLUSH) 0.9 %
5-40 SYRINGE (ML) INJECTION EVERY 12 HOURS SCHEDULED
Status: DISCONTINUED | OUTPATIENT
Start: 2024-01-08 | End: 2024-01-09 | Stop reason: HOSPADM

## 2024-01-08 RX ORDER — CLOPIDOGREL BISULFATE 75 MG/1
75 TABLET ORAL DAILY
Status: DISCONTINUED | OUTPATIENT
Start: 2024-01-09 | End: 2024-01-09 | Stop reason: HOSPADM

## 2024-01-08 RX ORDER — OXYCODONE HYDROCHLORIDE AND ACETAMINOPHEN 5; 325 MG/1; MG/1
1 TABLET ORAL EVERY 4 HOURS PRN
Status: DISCONTINUED | OUTPATIENT
Start: 2024-01-08 | End: 2024-01-08 | Stop reason: SDUPTHER

## 2024-01-08 RX ORDER — EPTIFIBATIDE 0.75 MG/ML
2 INJECTION, SOLUTION INTRAVENOUS CONTINUOUS
Status: ACTIVE | OUTPATIENT
Start: 2024-01-08 | End: 2024-01-08

## 2024-01-08 RX ORDER — METOPROLOL SUCCINATE 25 MG/1
25 TABLET, EXTENDED RELEASE ORAL DAILY
Status: DISCONTINUED | OUTPATIENT
Start: 2024-01-08 | End: 2024-01-09 | Stop reason: HOSPADM

## 2024-01-08 RX ORDER — GLUCOSAMINE HCL/CHONDROITIN SU 500-400 MG
CAPSULE ORAL
Qty: 100 STRIP | Refills: 3 | Status: SHIPPED | OUTPATIENT
Start: 2024-01-08

## 2024-01-08 RX ORDER — FUROSEMIDE 10 MG/ML
20 INJECTION INTRAMUSCULAR; INTRAVENOUS ONCE
Status: COMPLETED | OUTPATIENT
Start: 2024-01-08 | End: 2024-01-08

## 2024-01-08 RX ORDER — ASPIRIN 81 MG/1
81 TABLET ORAL DAILY
Status: DISCONTINUED | OUTPATIENT
Start: 2024-01-09 | End: 2024-01-08

## 2024-01-08 RX ORDER — OXYCODONE HYDROCHLORIDE AND ACETAMINOPHEN 5; 325 MG/1; MG/1
2 TABLET ORAL EVERY 4 HOURS PRN
Status: DISCONTINUED | OUTPATIENT
Start: 2024-01-08 | End: 2024-01-08 | Stop reason: SDUPTHER

## 2024-01-08 RX ORDER — ACETAMINOPHEN 325 MG/1
650 TABLET ORAL EVERY 4 HOURS PRN
Status: DISCONTINUED | OUTPATIENT
Start: 2024-01-08 | End: 2024-01-09 | Stop reason: HOSPADM

## 2024-01-08 RX ORDER — SODIUM CHLORIDE 0.9 % (FLUSH) 0.9 %
5-40 SYRINGE (ML) INJECTION PRN
Status: DISCONTINUED | OUTPATIENT
Start: 2024-01-08 | End: 2024-01-09 | Stop reason: HOSPADM

## 2024-01-08 RX ORDER — MORPHINE SULFATE 2 MG/ML
2 INJECTION, SOLUTION INTRAMUSCULAR; INTRAVENOUS
Status: DISCONTINUED | OUTPATIENT
Start: 2024-01-08 | End: 2024-01-09 | Stop reason: HOSPADM

## 2024-01-08 RX ORDER — ROSUVASTATIN CALCIUM 40 MG/1
40 TABLET, COATED ORAL NIGHTLY
Status: DISCONTINUED | OUTPATIENT
Start: 2024-01-08 | End: 2024-01-08 | Stop reason: SDUPTHER

## 2024-01-08 RX ORDER — LANCETS 30 GAUGE
1 EACH MISCELLANEOUS 3 TIMES DAILY
Qty: 100 EACH | Refills: 3 | Status: SHIPPED | OUTPATIENT
Start: 2024-01-08

## 2024-01-08 RX ORDER — INSULIN GLARGINE 100 [IU]/ML
52 INJECTION, SOLUTION SUBCUTANEOUS NIGHTLY
Status: DISCONTINUED | OUTPATIENT
Start: 2024-01-08 | End: 2024-01-09 | Stop reason: HOSPADM

## 2024-01-08 RX ORDER — ONDANSETRON 2 MG/ML
4 INJECTION INTRAMUSCULAR; INTRAVENOUS EVERY 6 HOURS PRN
Status: DISCONTINUED | OUTPATIENT
Start: 2024-01-08 | End: 2024-01-09 | Stop reason: HOSPADM

## 2024-01-08 RX ORDER — SODIUM CHLORIDE 9 MG/ML
INJECTION, SOLUTION INTRAVENOUS PRN
Status: DISCONTINUED | OUTPATIENT
Start: 2024-01-08 | End: 2024-01-09 | Stop reason: HOSPADM

## 2024-01-08 RX ADMIN — ATORVASTATIN CALCIUM 80 MG: 80 TABLET, FILM COATED ORAL at 21:01

## 2024-01-08 RX ADMIN — OXYCODONE AND ACETAMINOPHEN 1 TABLET: 10; 325 TABLET ORAL at 09:09

## 2024-01-08 RX ADMIN — SODIUM CHLORIDE, PRESERVATIVE FREE 10 ML: 5 INJECTION INTRAVENOUS at 21:03

## 2024-01-08 RX ADMIN — FUROSEMIDE 20 MG: 10 INJECTION, SOLUTION INTRAMUSCULAR; INTRAVENOUS at 10:27

## 2024-01-08 RX ADMIN — IOPAMIDOL 133 ML: 755 INJECTION, SOLUTION INTRAVENOUS at 13:12

## 2024-01-08 RX ADMIN — INSULIN LISPRO 4 UNITS: 100 INJECTION, SOLUTION INTRAVENOUS; SUBCUTANEOUS at 20:59

## 2024-01-08 RX ADMIN — GABAPENTIN 300 MG: 100 CAPSULE ORAL at 09:03

## 2024-01-08 RX ADMIN — METOPROLOL TARTRATE 50 MG: 50 TABLET ORAL at 09:03

## 2024-01-08 RX ADMIN — GABAPENTIN 300 MG: 100 CAPSULE ORAL at 21:01

## 2024-01-08 RX ADMIN — HEPARIN SODIUM 8 UNITS/KG/HR: 10000 INJECTION, SOLUTION INTRAVENOUS at 04:00

## 2024-01-08 RX ADMIN — METOPROLOL SUCCINATE 25 MG: 25 TABLET, EXTENDED RELEASE ORAL at 16:19

## 2024-01-08 RX ADMIN — ASPIRIN 81 MG 81 MG: 81 TABLET ORAL at 09:03

## 2024-01-08 RX ADMIN — OXYCODONE AND ACETAMINOPHEN 1 TABLET: 10; 325 TABLET ORAL at 04:03

## 2024-01-08 RX ADMIN — INSULIN GLARGINE 52 UNITS: 100 INJECTION, SOLUTION SUBCUTANEOUS at 20:59

## 2024-01-08 RX ADMIN — OXYCODONE AND ACETAMINOPHEN 1 TABLET: 10; 325 TABLET ORAL at 19:42

## 2024-01-08 RX ADMIN — GABAPENTIN 300 MG: 100 CAPSULE ORAL at 16:20

## 2024-01-08 RX ADMIN — PANTOPRAZOLE SODIUM 40 MG: 40 TABLET, DELAYED RELEASE ORAL at 09:03

## 2024-01-08 ASSESSMENT — PAIN SCALES - GENERAL
PAINLEVEL_OUTOF10: 9
PAINLEVEL_OUTOF10: 4
PAINLEVEL_OUTOF10: 0
PAINLEVEL_OUTOF10: 0
PAINLEVEL_OUTOF10: 4
PAINLEVEL_OUTOF10: 2
PAINLEVEL_OUTOF10: 0
PAINLEVEL_OUTOF10: 4

## 2024-01-08 ASSESSMENT — PAIN DESCRIPTION - LOCATION
LOCATION: KNEE

## 2024-01-08 ASSESSMENT — PAIN DESCRIPTION - ORIENTATION
ORIENTATION: RIGHT

## 2024-01-08 ASSESSMENT — PAIN DESCRIPTION - PAIN TYPE: TYPE: CHRONIC PAIN

## 2024-01-08 ASSESSMENT — PAIN DESCRIPTION - DESCRIPTORS: DESCRIPTORS: DISCOMFORT

## 2024-01-08 NOTE — PROGRESS NOTES
[facility]     Daily Progress Note      Admit Date:  1/6/2024    ASSESSMENT AND PLAN:  NSTEMI versus coronary spasm versus [place]  CAD in native artery, nonobstructive on last cath  CAD risk factors: DM, HTN, HLP    Plan  -Troponins peaked at 364 yesterday  -Remains pain-free  -stop heparin, nitro. SOB with wheeze start lasix cont lopressor  -Monitor telemetry  -possibly ACS.  -Based on these findings I recommend left heart cath for definitive evaluation of coronary arteries.  Risks, benefits, expectations, and alternative treatments were discussed.  Questions appropriately answered.  Belkis Morton agrees to proceed and verbalized understanding.       I discussed risks, benefits, and alternatives of heart cath and possible PCI to the patient. I discussed risks, incluiding risk of bleeding, infection, kidney injury (possibly requiring dialysis), vascular injury, arrhythmia, myocardial infarction, stroke, and even death, and the patient would like to proceed with heart cath.      -Albuterol per protocol for mild asthma    Subjective:  Ms. Morton feels markedly better than before.  No chest pain.  Has SOB with any walking. Notices wheezing    Objective:   /81   Pulse 78   Temp 98.1 °F (36.7 °C) (Temporal)   Resp 18   Ht 1.702 m (5' 7\")   Wt (!) 146.5 kg (322 lb 15.6 oz)   SpO2 97%   BMI 50.58 kg/m²     Intake/Output Summary (Last 24 hours) at 1/8/2024 1127  Last data filed at 1/8/2024 0637  Gross per 24 hour   Intake 1048.63 ml   Output --   Net 1048.63 ml       TELEMETRY: Sinus     Physical Exam:  General:  Awake, alert, oriented x 3, NAD  Skin:  Warm and dry  Neck:  JVD none  Chest:  nCrackles  Cardiovascular:  RRR S1S2, no S3, no murmur  Abdomen:  Soft, ND, NT, No HSM  Extremities:  No edema    Medications:    insulin lispro  0-16 Units SubCUTAneous TID WC    insulin lispro  0-4 Units SubCUTAneous Nightly    insulin glargine  26 Units SubCUTAneous Nightly    insulin lispro  10 Units

## 2024-01-08 NOTE — PROGRESS NOTES
PATIENT HISTORY    ECHO: DATE: 1/6/2024       EF: 50%  STRESS TEST PREFORMED:  Yes FINDINGS:  Stress Nuclear: Date:  11/4/20174  Result:  Positive: Unavailable     EF: 32%  EKG: Yes    Telemetry Monitor     Result: Normal  Pre CATH Rhythm: Normal Sinus Rhythm  HYPERTENSION: Yes  DYSLIPIDEMIA: No  FAMILY HX OF CAD: Yes  PRIOR MI: No  PRIOR PCI: No  PRIOR CABG: No  CEREBROVASCULAR DX: No  PERIPHERAL ARTERIAL DISEASE: No  CHRONIC LUNG DISEASE: Yes  TOBACCO: Never  DIABETIC: Yes, Insulin Treatment  CARDIAC ARREST: {No  DIALYSIS: No  HEART FAILURE: Yes     NYHA Class: Class III     Newly Diagnosed: Yes     HF Type: Diastolic  FRAILTY SCORE: 4 VULNERABLE (while not dependent on others for IADLs, symptoms limit activities)  CARDIAC CTA PREFORMED:  No  [place] CORONARY CALCIUM SCORE:   Assessed: No  Prior Diagnostic Coronary Angioplasty Procedure: Yes 11/6/2017 Nonobstructive Disease

## 2024-01-08 NOTE — PROGRESS NOTES
V2.0    [facility] [place]      Name:  Belkis Morton /Age/Sex: 1960  (63 y.o. female)   MRN & CSN:  [id number] & [id number] Encounter Date/Time: 2024 2:46 PM EST   Location:  [facility] PCP: ANNE-MARIE Bird MD     Attending:Lien Smith MD       Hospital Day: 3    Assessment and Recommendations   Belkis Morton is a 63 y.o. female with pmh of diabetes mellitus, hypertension, hyperlipidemia, CHFrEF  who presents with NSTEMI (non-ST elevated myocardial infarction) (HCC)      Plan:   #Chest pain:  #NSTEMI:  -Patient presented with chief complaint of chest pain.  -In the ED, patient was tachycardic and hypertensive.  -EKG showed no ST elevation.  -Initial troponin was 12 and repeat was 40 and 130.  -Echo was done and showed EF of fraction of 50%. Akinesis of the distal inferior and distal anteroseptal walls as well as the apex.  - Coronary Angiography w/ Severe single vessel CAD and Successful complex angioplasty and stenting of LAD  -Continue statin, Aspirin, plavix, Metoprolol  -Cardiology on board       # Hypertensive urgency:  -Patient presented with blood pressure of 191/92.  -Nitro drip  -Metoprolol     # Diabetes mellitus:  -Lantus 52 units nightly  -Lispro 10 units 3 times daily  -Hypoglycemic protocol     # CHFrEF:  -Strict in and out  -Daily weight     # Hyperlipidemia:  -Continue home statin        Diet Diet NPO Exceptions are: Sips of Water with Meds   DVT Prophylaxis [] Lovenox, [x]  Heparin, [] SCDs, [] Ambulation,  [] Eliquis, [] Xarelto  [] Coumadin   Code Status Full Code   Disposition From: Home  Expected Disposition: Home  Estimated Date of Discharge: tomorrow  Patient requires continued admission due to NSTEMI.    Surrogate Decision Maker/ POA  Daughter     Personally reviewed Lab Studies and Imaging     Discussed management of the case with cardiology who recommended cath today.    EKG interpreted personally and results no ST elevation.     Imaging that was

## 2024-01-08 NOTE — PROGRESS NOTES
Clinical Pharmacy Note    HF Core Measures Assessment    Latest EF: 50% on ECHO 1/6/24    Entresto/ACE/ARB (EF<40%): lisinopril-HCTZ 20-12.5 mg tablet - 1 tablet by mouth every day at home  Evidence based BB (bisoprolol, metoprolol XL, carvedilol) (EF<40%): metoprolol tartrate 50 mg BID  Aldosterone Antagonist (EF<35%): N/A  SGLT2 (HFpEF/HFmrEF/HFrEF): No    (May consider the use of hydralazine + nitrate in patients intolerant to ACEI/ARB)    Rosalva Joseph, PharmD, BCPS  Clinical Pharmacist  [phone]

## 2024-01-08 NOTE — PROGRESS NOTES
Transported to Cath lab on [facility] with two Cath lab RNs. Bedside handoff given.

## 2024-01-08 NOTE — PROGRESS NOTES
Patient brought over to CVU from cath lab and attached to CVU monitoring.  Report reviewed with cath lab RN.  R radial soft and no hematoma or oozing noted.  Right radial TR band in place and 2cc air will be removed every 15 minutes once patient is s/p intervention for 60 minutes.

## 2024-01-08 NOTE — PRE SEDATION
IntraVENous PRN Lien Smiht MD        magnesium sulfate 2000 mg in 50 mL IVPB premix  2,000 mg IntraVENous PRN Lien Smith MD        acetaminophen (TYLENOL) tablet 650 mg  650 mg Oral Q6H PRN Lien Smith MD        Or    acetaminophen (TYLENOL) suppository 650 mg  650 mg Rectal Q6H PRN Lien Smith MD        polyethylene glycol (GLYCOLAX) packet 17 g  17 g Oral Daily PRN Lien Smith MD        aspirin chewable tablet 81 mg  81 mg Oral Daily Lien Smith MD   81 mg at 01/08/24 0903    atorvastatin (LIPITOR) tablet 80 mg  80 mg Oral Nightly Lien Smith MD   80 mg at 01/07/24 2138    dextrose bolus 10% 125 mL  125 mL IntraVENous PRN Lien Smith MD        Or    dextrose bolus 10% 250 mL  250 mL IntraVENous PRN Lien Smith MD        glucagon injection 1 mg  1 mg SubCUTAneous PRN Lien Smith MD        dextrose 10 % infusion   IntraVENous Continuous PRN Lien Smith MD        nitroGLYCERIN 200 mcg/mL in dextrose 5%  5-200 mcg/min IntraVENous Continuous Lien Smith MD 9 mL/hr at 01/08/24 0637 30 mcg/min at 01/08/24 0637    oxyCODONE-acetaminophen (PERCOCET)  MG per tablet 1 tablet  1 tablet Oral Q4H PRN Claudy Mares MD   1 tablet at 01/08/24 0909    gabapentin (NEURONTIN) capsule 300 mg  300 mg Oral TID Claudy Mares MD   300 mg at 01/08/24 0903       Past Medical History:    Past Medical History:   Diagnosis Date    Anxiety     Arthritis     Asthma     as child    Bilateral chronic knee pain     sees pain management    Degenerative disc disease, cervical MRI 2011    Multilevel cervical degenerative this disease most significant at the C7-T1 level where there is probable impingement of the exiting right C8 nerve root caused by disc herniation.    Depression     Diabetes     Amira Espinosa, NP at     High blood pressure     Hypertriglyceridemia     Lumbar herniated disc MRI 2011    L5-S1    Morbid obesity (HCC)     Opiate dependence (HCC)

## 2024-01-08 NOTE — CARE COORDINATION
Per RN pt having trouble with cost of diabetic meds and supplies.    OC sent message to Jae with financial services and she will meet with pt.       Updated at 11:46 AM     Per jae with financial services pt is 100% financial assistance and she has notified outpt pharmacy. OC sent PS message to MD about this and all meds and supplies please sent to outpt pharmacy.      Amy Garcia RN, BSN  [phone]

## 2024-01-08 NOTE — PROGRESS NOTES
[facility]  Diabetes Education   Progress Note       NAME:  Belkis Morton  MEDICAL RECORD NUMBER:  [id number]  AGE: 63 y.o.   GENDER: female  : 1960  TODAY'S DATE:  2024    Subjective   Reason for Diabetes Education Evaluation and Assessment: Hyperglycemia on admission,  mg/dL    the patient states at home she;  Stopped taking humalog when glucose meter broke months ago  Takes lantus 52 units every morning  Could not afford $300 co-pay for Trulicity or Ozempic with Medicare  Follows a diabetic diet most of the time  moves for 20-30 minutes a day    Visit Type: evaluation      Belkis Morton is a 63 y.o. female referred by:  [x] Physician  [] Nursing  [] Chart Review   [] Other:     PAST MEDICAL HISTORY        Diagnosis Date    Anxiety     Arthritis     Asthma     as child    Bilateral chronic knee pain     sees pain management    Degenerative disc disease, cervical MRI     Multilevel cervical degenerative this disease most significant at the C7-T1 level where there is probable impingement of the exiting right C8 nerve root caused by disc herniation.    Depression     Diabetes     Amira Espinosa NP at     High blood pressure     Hypertriglyceridemia     Lumbar herniated disc MRI     L5-S1    Morbid obesity (HCC)     Opiate dependence (HCC)     Osteomyelitis (HCC)     Pneumonia     as child       PAST SURGICAL HISTORY    Past Surgical History:   Procedure Laterality Date    CARDIAC CATHETERIZATION  2017    1.  Diffusely diseased distal LAD.  Distal LAD has a 50% narrowing in two ---       FAMILY HISTORY    Family History   Problem Relation Age of Onset    Lung Cancer Mother 50        + tob    Osteoarthritis Other     Cancer Other     Diabetes Other     Hypertension Other     Stroke Other     Heart Disease Other     Diabetes Maternal Grandmother        SOCIAL HISTORY    Social History     Tobacco Use    Smoking status: Never    Smokeless tobacco: Never

## 2024-01-08 NOTE — PLAN OF CARE
Problem: Pain  Goal: Verbalizes/displays adequate comfort level or baseline comfort level  Outcome: Progressing     Problem: ABCDS Injury Assessment  Goal: Absence of physical injury  Outcome: Progressing     Problem: Safety - Adult  Goal: Free from fall injury  Outcome: Progressing

## 2024-01-08 NOTE — PROCEDURES
Patient:  Belkis Morton   :   1960    PROCEDURAL SUMMARY  ~Consent:   Obtained written and verbal consent      Risks/benefits explained in detail  ~Procedure:    Left Heart Catheterization  ~Medications:    Procedural sedation with minimal conscious sedation  ~Complications:   None  ~Blood Loss:    <10cc  ~Specimens:    None obtained  ~Pre-sedation re-evaluation: Performed immediately prior to procedure.  ~Performing Physician:          Jameson Collins MD    ~Assistants:       None    INDICATIONS:  Pre-Procedure Diagnosis:  ACS > 24 hrs, New Onset Angina <= 2 months, Worsening Angina, and Suspected CAD    Post-Procedure Diagnosis: same    PROCEDURES PERFORMED:   Left heart catheterization with  PCI.    PROCEDURE DETAILS/TECHNIQUE:  Local anesthetic was given and access was obtained in the right Radial Artery using a micropuncture technique and a (5/6) Fr Slender Terumo Sheath was placed without difficulty. Catheters were advanced over a 0.35 wire under fluoroscopic guidance  Left coronary angiography was done using a 5 Fr Phillip L 3.5 diagnostic catheter.  Right coronary angiography was done using a 5 Fr Phillip R4 diagnostic catheter.  Left ventriculography was done using a 5 Fr pigtail catheter. At the end of the procedure a TR band device was used for hemostasis.     ANGIOGRAM/CORONARY ARTERIOGRAM:     Left main artery Bifurcates into the left anterior descending artery and left circumflex artery nml   Left anterior descending artery Gives rise to 2 diagonal arteries Mid 99%, distal 50%   Diagonals  nml   Left circumflex artery Non-dominant vessel that gives rise to 2 obtuse marginal arteries nml   Obtuse Marginals  nml     Right coronary artery Dominant vessel that gives rise to the posterior descending artery and posterolateral branch nml   Posterior descending artery Posterior lateral branch  nml  nml       LEFT VENTRICULOGRAM:  Left ventricular angiogram was done in the 30° JAMES projection and

## 2024-01-09 VITALS
BODY MASS INDEX: 45.99 KG/M2 | RESPIRATION RATE: 16 BRPM | TEMPERATURE: 98.1 F | HEART RATE: 85 BPM | DIASTOLIC BLOOD PRESSURE: 91 MMHG | SYSTOLIC BLOOD PRESSURE: 144 MMHG | WEIGHT: 293 LBS | HEIGHT: 67 IN | OXYGEN SATURATION: 98 %

## 2024-01-09 LAB
ANION GAP SERPL CALCULATED.3IONS-SCNC: 8 MMOL/L (ref 3–16)
BUN SERPL-MCNC: 17 MG/DL (ref 7–20)
CALCIUM SERPL-MCNC: 9 MG/DL (ref 8.3–10.6)
CHLORIDE SERPL-SCNC: 103 MMOL/L (ref 99–110)
CO2 SERPL-SCNC: 27 MMOL/L (ref 21–32)
CREAT SERPL-MCNC: 0.9 MG/DL (ref 0.6–1.2)
DEPRECATED RDW RBC AUTO: 14.3 % (ref 12.4–15.4)
EKG ATRIAL RATE: 71 BPM
EKG DIAGNOSIS: NORMAL
EKG P AXIS: 39 DEGREES
EKG P-R INTERVAL: 162 MS
EKG Q-T INTERVAL: 520 MS
EKG QRS DURATION: 102 MS
EKG QTC CALCULATION (BAZETT): 565 MS
EKG R AXIS: -11 DEGREES
EKG T AXIS: 233 DEGREES
EKG VENTRICULAR RATE: 71 BPM
EST. AVERAGE GLUCOSE BLD GHB EST-MCNC: 203 MG/DL
GFR SERPLBLD CREATININE-BSD FMLA CKD-EPI: >60 ML/MIN/{1.73_M2}
GLUCOSE SERPL-MCNC: 156 MG/DL (ref 70–99)
HBA1C MFR BLD: 8.7 %
HCT VFR BLD AUTO: 33.1 % (ref 36–48)
HGB BLD-MCNC: 11.5 G/DL (ref 12–16)
MCH RBC QN AUTO: 30.3 PG (ref 26–34)
MCHC RBC AUTO-ENTMCNC: 34.8 G/DL (ref 31–36)
MCV RBC AUTO: 86.9 FL (ref 80–100)
PLATELET # BLD AUTO: 266 K/UL (ref 135–450)
PMV BLD AUTO: 7.9 FL (ref 5–10.5)
POTASSIUM SERPL-SCNC: 4.2 MMOL/L (ref 3.5–5.1)
RBC # BLD AUTO: 3.81 M/UL (ref 4–5.2)
SODIUM SERPL-SCNC: 138 MMOL/L (ref 136–145)
WBC # BLD AUTO: 6.6 K/UL (ref 4–11)

## 2024-01-09 PROCEDURE — 6370000000 HC RX 637 (ALT 250 FOR IP): Performed by: INTERNAL MEDICINE

## 2024-01-09 PROCEDURE — 93010 ELECTROCARDIOGRAM REPORT: CPT | Performed by: INTERNAL MEDICINE

## 2024-01-09 PROCEDURE — 6370000000 HC RX 637 (ALT 250 FOR IP): Performed by: FAMILY MEDICINE

## 2024-01-09 PROCEDURE — 6370000000 HC RX 637 (ALT 250 FOR IP): Performed by: STUDENT IN AN ORGANIZED HEALTH CARE EDUCATION/TRAINING PROGRAM

## 2024-01-09 PROCEDURE — 2580000003 HC RX 258: Performed by: INTERNAL MEDICINE

## 2024-01-09 PROCEDURE — 80048 BASIC METABOLIC PNL TOTAL CA: CPT

## 2024-01-09 PROCEDURE — 85027 COMPLETE CBC AUTOMATED: CPT

## 2024-01-09 PROCEDURE — 6360000002 HC RX W HCPCS: Performed by: INTERNAL MEDICINE

## 2024-01-09 PROCEDURE — 99232 SBSQ HOSP IP/OBS MODERATE 35: CPT | Performed by: INTERNAL MEDICINE

## 2024-01-09 RX ORDER — ATORVASTATIN CALCIUM 80 MG/1
80 TABLET, FILM COATED ORAL NIGHTLY
Qty: 30 TABLET | Refills: 3 | Status: SHIPPED | OUTPATIENT
Start: 2024-01-09

## 2024-01-09 RX ORDER — INSULIN GLARGINE 100 [IU]/ML
52 INJECTION, SOLUTION SUBCUTANEOUS NIGHTLY
Qty: 5 ADJUSTABLE DOSE PRE-FILLED PEN SYRINGE | Refills: 3 | Status: SHIPPED | OUTPATIENT
Start: 2024-01-09

## 2024-01-09 RX ORDER — FUROSEMIDE 10 MG/ML
40 INJECTION INTRAMUSCULAR; INTRAVENOUS ONCE
Status: COMPLETED | OUTPATIENT
Start: 2024-01-09 | End: 2024-01-09

## 2024-01-09 RX ORDER — INSULIN LISPRO 200 [IU]/ML
10 INJECTION, SOLUTION SUBCUTANEOUS 3 TIMES DAILY PRN
Qty: 5 ADJUSTABLE DOSE PRE-FILLED PEN SYRINGE | Refills: 2 | Status: SHIPPED | OUTPATIENT
Start: 2024-01-09

## 2024-01-09 RX ORDER — CLOPIDOGREL BISULFATE 75 MG/1
75 TABLET ORAL DAILY
Qty: 30 TABLET | Refills: 3 | Status: SHIPPED | OUTPATIENT
Start: 2024-01-09

## 2024-01-09 RX ORDER — INSULIN LISPRO 200 [IU]/ML
10 INJECTION, SOLUTION SUBCUTANEOUS 3 TIMES DAILY PRN
Qty: 5 ADJUSTABLE DOSE PRE-FILLED PEN SYRINGE | Refills: 2 | Status: SHIPPED | OUTPATIENT
Start: 2024-01-09 | End: 2024-01-09

## 2024-01-09 RX ORDER — METOPROLOL SUCCINATE 25 MG/1
25 TABLET, EXTENDED RELEASE ORAL DAILY
Qty: 30 TABLET | Refills: 3 | Status: SHIPPED | OUTPATIENT
Start: 2024-01-09

## 2024-01-09 RX ADMIN — INSULIN LISPRO 10 UNITS: 100 INJECTION, SOLUTION INTRAVENOUS; SUBCUTANEOUS at 09:06

## 2024-01-09 RX ADMIN — GABAPENTIN 300 MG: 100 CAPSULE ORAL at 09:05

## 2024-01-09 RX ADMIN — ASPIRIN 81 MG 81 MG: 81 TABLET ORAL at 09:05

## 2024-01-09 RX ADMIN — METOPROLOL SUCCINATE 25 MG: 25 TABLET, EXTENDED RELEASE ORAL at 09:10

## 2024-01-09 RX ADMIN — SODIUM CHLORIDE, PRESERVATIVE FREE 10 ML: 5 INJECTION INTRAVENOUS at 09:10

## 2024-01-09 RX ADMIN — CLOPIDOGREL BISULFATE 75 MG: 75 TABLET ORAL at 09:05

## 2024-01-09 RX ADMIN — FUROSEMIDE 40 MG: 10 INJECTION, SOLUTION INTRAMUSCULAR; INTRAVENOUS at 09:06

## 2024-01-09 RX ADMIN — OXYCODONE AND ACETAMINOPHEN 1 TABLET: 10; 325 TABLET ORAL at 09:05

## 2024-01-09 ASSESSMENT — PAIN DESCRIPTION - LOCATION: LOCATION: BACK

## 2024-01-09 ASSESSMENT — PAIN SCALES - GENERAL: PAINLEVEL_OUTOF10: 4

## 2024-01-09 NOTE — PLAN OF CARE
Pt alert and oriented. Pt able to communicate present pain and use the pain scale appropriately. Nonpharmacological pain reducers and pain medication offered as needed. Will cont to monitor.

## 2024-01-09 NOTE — PROGRESS NOTES
Discharge instructions reviewed with patient and family member.  Patient and family verbalized understanding.  All home medications have been reviewed, questions answered and patient voiced understanding. All medication side effects reviewed and patient and family verbalized understanding. Follow up appointment(s) reviewed with patient and all attempts made to schedule within 7-10 days of discharge.  Patient given prescriptions, discharge instructions, and appointment times.  Patient discharged to home with family via private car.  Taken to lobby via wheelchair.

## 2024-01-09 NOTE — CARE COORDINATION
Md sent medications to outpt pharmacy for meds to bed.     CM met with patient and provided her with the following prescription assistance resources: [facility] prescription assistance program hand out , [facility] pharmacy information and referral form, alternative pharmacy handout, Patient help Network number from on line [phone],and prescription assistance program of [facility] handout form on-line with websites and phone numbers.      Patient discharged 1/9/2024 to home with meds to bed and the above resources.   All discharge needs met per case management.    Amy Garcia RN, BSN  [phone]

## 2024-01-09 NOTE — CARE COORDINATION
01/09/24 0902   IMM Letter   IMM Letter given to Patient/Family/Significant other/Guardian/POA/by: Amy Garcia RN CM - second notice   IMM Letter date given: 01/09/24   IMM Letter time given: 0900  (copy made for hard chart)

## 2024-01-09 NOTE — PROGRESS NOTES
Nutrition Education    Pt triggered for consult on diabetes nutrition education, HbA1c of 9.0% on 11/20. Per diabetes educator note, pt follows a diabetic diet most of the time at home. Upon visiting, pt denied need for verbal education at this time. Briefly discussed limiting carb intake to 60 grams at meals, 15 to 30 grams at snacks. Handout provided with RD name and number should pt have any questions regarding materials provided.     Educated on 1/9  Learners: Patient  Readiness: Acceptance  Method: Handout  Response: Verbalizes Understanding  Contact name and number provided.    Joana Schrader MS, RD, LD  Contact Number: [phone]

## 2024-01-09 NOTE — DISCHARGE INSTRUCTIONS
Post Angiogram/ Intervention Discharge Instructions      Do you have the help you need at home?        Activity:  You may drive in 24hrs unless instructed by your doctor   Resume normal activity in one week  Avoid lifting, pushing, or pulling more than 10 lbs. For one week. (A gallon of milk is 7 lbs)  Limit stair climbing to once a day for two weeks.  You may walk at an easy pace on ground level.  Plan rest periods during the day.  Avoid tension and stress.  Learn to manage your stress.  Avoid work that increases muscle tension.        (straining with bowel movements, moving furniture)    Wound Care:  You may shower, but no bathtubs, pools, or hot tubs for one week.  Inspect area daily.  Normal observations:  Soreness and tenderness that may last for one week, mild pink to red oozing from incision site for up to 24 hrs after discharge,    bruising that could last up to two weeks.  Clean with soap and water.  NO lotion or powder.  Dry area thoroughly.  Apply band    aide for 48 hrs.    Nutrition:   Low fat, low salt diet (guidelines for Heart Healthy eating)  Limit caffeine to 1-2 cups per day (coffee, tea, chocolate, soda)  Eat high fiber to avoid constipation and straining during bowel movements (fresh veggies and fruit, whole grain)  Limit alcohol to two servings a day ( 8 oz beer, 1 oz liquor, 4 oz wine )  Drink at least 8 to 10 glasses of decaffeinated, non-alcoholic fluid for the next 24 hours to flush the x-ray dye used for your angiogram out of your body.     Depression:  It is not unusual to have feelings of anxiety, fear, or depression after your procedure.  If you need help with these feelings, call your primary care physician.  There are medications to help you and healing usually occurs sooner if you are not depressed.    Cardiac Rehab Information Given : [phone]  Your physician has referred you to Cardiac Rehab. This is an important part of your recovery.   You have been given a brief

## 2024-01-09 NOTE — PROGRESS NOTES
CLINICAL PHARMACY NOTE: MEDS TO BEDS    Total # of Prescriptions Filled: 10   The following medications were delivered to the patient:  INSULIN LISPRO   PEN NEEDLES 32G  BASAGLAR KWIKPEN   METOPROLOL SUCCINATE ER  CLOPIDOGREL BISULFATE 75MG  ATORVASTATIN CALCIUM 80MG  ALCOHOL PADS  TRUEPLUS LANCETS  TRUE METRIX TEST STRIPS  TRUE METRIC METER KIT    Additional Documentation:  Patient picked up in outpatient pharmacy = signed  Darling Bianchi - Pharmacy Tech.

## 2024-01-09 NOTE — PROGRESS NOTES
[facility] Daily Progress Note      Admit Date:  1/6/2024    Chief Complaint   Patient presents with    Chest Pain     Pt brought in by [facility] from home. Pt c/o 10/10 midsternal chest pressure. Pt states she has hx of cardiac stents.         Subjective:  Ms. Morton doing well this morning. R radial site CDI. Denies chest pain or pressure this morning.     Objective:   BP (!) 141/62   Pulse 87   Temp 97.8 °F (36.6 °C) (Temporal)   Resp 15   Ht 1.702 m (5' 7\")   Wt (!) 145.9 kg (321 lb 11.2 oz)   SpO2 98%   BMI 50.39 kg/m²     Intake/Output Summary (Last 24 hours) at 1/9/2024 0942  Last data filed at 1/9/2024 0623  Gross per 24 hour   Intake 1080 ml   Output --   Net 1080 ml       TELEMETRY: Sinus     Physical Exam:  General:  Awake, alert, oriented x 3, NAD  Skin:  Warm and dry  Neck:  JVD flat  Chest:  normal air entry  Cardiovascular:  RRR S1S2, no S3, no mrmr  Abdomen:  Soft, ND, NT, No HSM  Extremities:  No edema    Medications:    sodium chloride flush  5-40 mL IntraVENous 2 times per day    clopidogrel  75 mg Oral Daily    metoprolol succinate  25 mg Oral Daily    insulin glargine  52 Units SubCUTAneous Nightly    insulin lispro  0-16 Units SubCUTAneous TID WC    insulin lispro  0-4 Units SubCUTAneous Nightly    insulin lispro  10 Units SubCUTAneous TID WC    pantoprazole  40 mg Oral QAM AC    sodium chloride flush  5-40 mL IntraVENous 2 times per day    aspirin  81 mg Oral Daily    atorvastatin  80 mg Oral Nightly    gabapentin  300 mg Oral TID      sodium chloride      sodium chloride      dextrose       sodium chloride flush, sodium chloride, acetaminophen, morphine, ondansetron, albuterol, ondansetron, albuterol sulfate HFA, sodium chloride flush, sodium chloride, potassium chloride **OR** potassium alternative oral replacement **OR** potassium chloride, magnesium sulfate, [DISCONTINUED] acetaminophen **OR** acetaminophen, polyethylene glycol, dextrose bolus **OR** dextrose bolus,

## 2024-01-09 NOTE — PROGRESS NOTES
Discharge order noted. Reviewed diabetes education, all questions answered. the patient states she only has one pen each of her Humalog and her Lantus and needs refills. Secure message sent to , telephone order received to order insulin and needle refills from [facility]. Marlene Pulido RN, BSN, [facility].

## 2024-01-10 ENCOUNTER — TELEPHONE (OUTPATIENT)
Dept: INTERNAL MEDICINE CLINIC | Age: 64
End: 2024-01-10

## 2024-01-10 LAB
POC ACT LR: 211 SEC
POC ACT LR: 279 SEC

## 2024-01-10 NOTE — TELEPHONE ENCOUNTER
Care Transitions Initial Follow Up Call    Outreach made within 2 business days of discharge: Yes    Patient: Belkis Morton Patient : 1960   MRN: [id number]  Reason for Admission: There are no discharge diagnoses documented for the most recent discharge.  Discharge Date: 24       Spoke with: Patient    Discharge department/facility: [facility] Interactive Patient Contact:  Was patient able to fill all prescriptions: Yes  Was patient instructed to bring all medications to the follow-up visit: No:   Is patient taking all medications as directed in the discharge summary? Yes  Does patient understand their discharge instructions: Yes  Does patient have questions or concerns that need addressed prior to 7-14 day follow up office visit: yes -           Bridgette Mc LPN

## 2024-01-18 ENCOUNTER — OFFICE VISIT (OUTPATIENT)
Dept: INTERNAL MEDICINE CLINIC | Age: 64
End: 2024-01-18

## 2024-01-18 VITALS — OXYGEN SATURATION: 99 % | DIASTOLIC BLOOD PRESSURE: 72 MMHG | SYSTOLIC BLOOD PRESSURE: 138 MMHG | HEART RATE: 113 BPM

## 2024-01-18 DIAGNOSIS — I50.22 CHRONIC SYSTOLIC (CONGESTIVE) HEART FAILURE (HCC): ICD-10-CM

## 2024-01-18 DIAGNOSIS — Z09 HOSPITAL DISCHARGE FOLLOW-UP: Primary | ICD-10-CM

## 2024-01-18 DIAGNOSIS — E11.40 TYPE 2 DIABETES MELLITUS WITH DIABETIC NEUROPATHY, WITH LONG-TERM CURRENT USE OF INSULIN (HCC): ICD-10-CM

## 2024-01-18 DIAGNOSIS — F33.42 RECURRENT MAJOR DEPRESSIVE DISORDER, IN FULL REMISSION (HCC): ICD-10-CM

## 2024-01-18 DIAGNOSIS — Z95.5 S/P CORONARY ARTERY STENT PLACEMENT: ICD-10-CM

## 2024-01-18 DIAGNOSIS — Z12.11 COLON CANCER SCREENING: ICD-10-CM

## 2024-01-18 DIAGNOSIS — I10 ESSENTIAL HYPERTENSION: ICD-10-CM

## 2024-01-18 DIAGNOSIS — Z79.4 TYPE 2 DIABETES MELLITUS WITH DIABETIC NEUROPATHY, WITH LONG-TERM CURRENT USE OF INSULIN (HCC): ICD-10-CM

## 2024-01-18 DIAGNOSIS — F33.1 MODERATE EPISODE OF RECURRENT MAJOR DEPRESSIVE DISORDER (HCC): ICD-10-CM

## 2024-01-18 DIAGNOSIS — R23.8 SKIN IRRITATION: ICD-10-CM

## 2024-01-18 DIAGNOSIS — D64.9 MILD ANEMIA: ICD-10-CM

## 2024-01-18 RX ORDER — SEMAGLUTIDE 0.68 MG/ML
0.5 INJECTION, SOLUTION SUBCUTANEOUS WEEKLY
Qty: 12 ML | Refills: 2 | Status: SHIPPED | OUTPATIENT
Start: 2024-01-18

## 2024-01-18 RX ORDER — ASPIRIN 81 MG/1
81 TABLET, CHEWABLE ORAL DAILY
Qty: 30 TABLET | Refills: 5 | Status: SHIPPED
Start: 2024-01-18

## 2024-01-18 RX ORDER — BENZOCAINE/MENTHOL 6 MG-10 MG
LOZENGE MUCOUS MEMBRANE
Qty: 30 G | Refills: 1 | Status: SHIPPED | OUTPATIENT
Start: 2024-01-18 | End: 2024-01-25

## 2024-01-18 ASSESSMENT — PATIENT HEALTH QUESTIONNAIRE - PHQ9
8. MOVING OR SPEAKING SO SLOWLY THAT OTHER PEOPLE COULD HAVE NOTICED. OR THE OPPOSITE, BEING SO FIGETY OR RESTLESS THAT YOU HAVE BEEN MOVING AROUND A LOT MORE THAN USUAL: 0
SUM OF ALL RESPONSES TO PHQ QUESTIONS 1-9: 3
10. IF YOU CHECKED OFF ANY PROBLEMS, HOW DIFFICULT HAVE THESE PROBLEMS MADE IT FOR YOU TO DO YOUR WORK, TAKE CARE OF THINGS AT HOME, OR GET ALONG WITH OTHER PEOPLE: 0
7. TROUBLE CONCENTRATING ON THINGS, SUCH AS READING THE NEWSPAPER OR WATCHING TELEVISION: 0
2. FEELING DOWN, DEPRESSED OR HOPELESS: 0
SUM OF ALL RESPONSES TO PHQ QUESTIONS 1-9: 3
1. LITTLE INTEREST OR PLEASURE IN DOING THINGS: 0
SUM OF ALL RESPONSES TO PHQ QUESTIONS 1-9: 3
3. TROUBLE FALLING OR STAYING ASLEEP: 3
9. THOUGHTS THAT YOU WOULD BE BETTER OFF DEAD, OR OF HURTING YOURSELF: 0
6. FEELING BAD ABOUT YOURSELF - OR THAT YOU ARE A FAILURE OR HAVE LET YOURSELF OR YOUR FAMILY DOWN: 0
SUM OF ALL RESPONSES TO PHQ9 QUESTIONS 1 & 2: 0
SUM OF ALL RESPONSES TO PHQ QUESTIONS 1-9: 3

## 2024-01-18 NOTE — PROGRESS NOTES
disorder (HCC)       Medications listed as ordered at the time of discharge from hospital     Medication List            Accurate as of January 18, 2024  4:12 PM. If you have any questions, ask your nurse or doctor.                START taking these medications      hydrocortisone 1 % cream  Apply topically 2 times daily for 7 days  Started by: ANNE-MARIE Bird MD     Ozempic (0.25 or 0.5 MG/DOSE) 2 MG/3ML Sopn  Generic drug: Semaglutide(0.25 or 0.5MG/DOS)  Inject 0.5 mg into the skin once a week  Started by: ANNE-MARIE Bird MD            CHANGE how you take these medications      aspirin 81 MG chewable tablet  Take 1 tablet by mouth daily  What changed: additional instructions     nitroGLYCERIN 0.4 MG SL tablet  Commonly known as: NITROSTAT  PLACE 1 TABLET UNDER TONGUE EVERY 5 MINUTES AS NEEDED FOR CHEST PAIN. MAX OF 3 DOSES  What changed: Another medication with the same name was removed. Continue taking this medication, and follow the directions you see here.  Changed by: ANNE-MARIE Bird MD            CONTINUE taking these medications      albuterol sulfate  (90 Base) MCG/ACT inhaler  Commonly known as: ProAir HFA  Inhale 2 puffs into the lungs every 6 hours as needed for Wheezing     amLODIPine 10 MG tablet  Commonly known as: NORVASC  TAKE 1 TABLET BY MOUTH EVERY DAY     atorvastatin 80 MG tablet  Commonly known as: LIPITOR  Take 1 tablet by mouth nightly     blood glucose monitor kit and supplies  Dispense sufficient amount for indicated testing frequency plus additional to accommodate PRN testing needs. Dispense all needed supplies to include: monitor, strips, lancing device, lancets, control solutions, alcohol swabs.     blood glucose test strips  Test 3 times a day & as needed for symptoms of irregular blood glucose. Dispense sufficient amount for indicated testing frequency plus additional to accommodate PRN testing needs.     Blood Pressure Kit  Use as directed     clopidogrel 75 MG

## 2024-01-22 NOTE — PROGRESS NOTES
patient. Recommend maintaining a smoke-free lifestyle. Products available for smoking cessation were discussed.    Patient is on a beta blocker   Patient is on an ACEi  Patient is on a statin    Dual Antiplatelet therapy has been recommended / prescribed for this patient. Education conducted on adverse reactions including bleeding were discussed.    Angiotension-converting enzyme inhibitor therapy has been prescribed / recommended. Daily weights, low sodium diet were discussed. Patient instructed to call the office with a weight gain: 3lbs over night and/or 5lbs in one week, swelling, shortness of breath, orthopnea, and/or PND.    Discussed exercise: 30min of sustained cardiovascular exercise most days of the week   Discussed Low saturated fat / Cholesterol diet.     Thank you for allowing us to participate in the care of Zoila Praveen.    Gwen MORA-[facility]   Office: [phone]    Documentation of today's visit sent to PCP    NOTE:  This report was transcribed using voice recognition software.  Every effort was made to ensure accuracy; however, inadvertent computerized transcription errors may be present.

## 2024-01-25 ENCOUNTER — OFFICE VISIT (OUTPATIENT)
Dept: CARDIOLOGY CLINIC | Age: 64
End: 2024-01-25

## 2024-01-25 VITALS
DIASTOLIC BLOOD PRESSURE: 60 MMHG | BODY MASS INDEX: 44.41 KG/M2 | SYSTOLIC BLOOD PRESSURE: 116 MMHG | HEIGHT: 68 IN | WEIGHT: 293 LBS | HEART RATE: 86 BPM | OXYGEN SATURATION: 94 %

## 2024-01-25 DIAGNOSIS — Z09 HOSPITAL DISCHARGE FOLLOW-UP: ICD-10-CM

## 2024-01-25 DIAGNOSIS — E78.5 HYPERLIPIDEMIA, UNSPECIFIED HYPERLIPIDEMIA TYPE: ICD-10-CM

## 2024-01-25 DIAGNOSIS — R06.83 SNORES: ICD-10-CM

## 2024-01-25 DIAGNOSIS — I25.10 CORONARY ARTERY DISEASE DUE TO LIPID RICH PLAQUE: Primary | ICD-10-CM

## 2024-01-25 DIAGNOSIS — I42.9 CARDIOMYOPATHY, UNSPECIFIED TYPE (HCC): ICD-10-CM

## 2024-01-25 DIAGNOSIS — I25.83 CORONARY ARTERY DISEASE DUE TO LIPID RICH PLAQUE: Primary | ICD-10-CM

## 2024-01-25 DIAGNOSIS — Z79.899 HIGH RISK MEDICATION USE: ICD-10-CM

## 2024-01-25 DIAGNOSIS — I10 HYPERTENSION, UNSPECIFIED TYPE: ICD-10-CM

## 2024-01-25 PROCEDURE — 3078F DIAST BP <80 MM HG: CPT | Performed by: NURSE PRACTITIONER

## 2024-01-25 PROCEDURE — 1123F ACP DISCUSS/DSCN MKR DOCD: CPT | Performed by: NURSE PRACTITIONER

## 2024-01-25 PROCEDURE — 3074F SYST BP LT 130 MM HG: CPT | Performed by: NURSE PRACTITIONER

## 2024-01-25 PROCEDURE — 1111F DSCHRG MED/CURRENT MED MERGE: CPT | Performed by: NURSE PRACTITIONER

## 2024-01-25 PROCEDURE — 99214 OFFICE O/P EST MOD 30 MIN: CPT | Performed by: NURSE PRACTITIONER

## 2024-01-25 RX ORDER — GABAPENTIN 300 MG/1
300 CAPSULE ORAL 3 TIMES DAILY
COMMUNITY

## 2024-01-25 RX ORDER — FUROSEMIDE 20 MG/1
20 TABLET ORAL DAILY
Qty: 60 TABLET | Refills: 1 | Status: SHIPPED | OUTPATIENT
Start: 2024-01-25

## 2024-01-25 NOTE — PATIENT INSTRUCTIONS
Start lasix 40mg (two 20mg tabs) for 4 days. Then take 20mg once daily thereafter.     Labs in 1 week    Jose Herron P - sleep medicine   [address]  [address]  [place] [address]  [phone]     Referral to cardiac rehab     Weigh yourself daily in the Morning. Record weights and date in a diary. Call if you have a weight gain of 3lbs in a day and/or 5lbs in 1 week. Call if you start noticing increased shortness of breath and/or swelling. Limit salt intake to 2g (2,000mg) / day. Limit fluid intake to 64oz / day (this includes all liquids; coffee, water, soup, popsicle, ect.).        Monitor home blood pressure. Goal is less than 140/80 but to remain greater than 100/50. Heart rate to remain >55 beats per minute. Create a log and bring to office visits.      Complete fasting blood work beginning of April with new statin. Fast for 12 hours prior, may have black coffee or water.     Eat less of these foods  Potato chips, french fries, and other “junk” foods  Vegetables cooked in butter, cheese, or cream sauces  Fried foods  Full fat dairy products   Walker, sausage, and organ meats (like liver)  Egg yolks  Cheesecake, pastries, doughnuts, ice cream  Butter and margarine  Sweetened drinks   Tropical oils such as coconut and palm oil  Mediterranean-like diet    Eat more of these foods  Whole-grain breads and pasta, brown rice, whole-grain bagels  Fresh, frozen, baked, or steamed fruits and vegetables  Steamed, baked, or fresh foods  Fat-free dairy products   Fish, skinless poultry, lean cuts of meat (with fat trimmed away), soy products  Egg whites, egg substitutes  Dessert examples: Danny food cake, fig bars, animal crackers, liban crackers, air-popped popcorn, low-fat frozen desserts (yogurt, sherbet, ice milk)  Olive oil or canola oil (in small amounts)    The American Heart Association offers these guidelines for how much fat to include in a heart-healthy diet:  Type of fat Recommendation   Saturated fat Less

## 2024-02-05 ENCOUNTER — TELEPHONE (OUTPATIENT)
Dept: CARDIAC REHAB | Age: 64
End: 2024-02-05

## 2024-02-05 NOTE — TELEPHONE ENCOUNTER
Called pt. Re: cardiac rehab evaluation tues. 1500. Coming. Pt. Spoke to medicare last wk. Lost medicare b d/t payment issues, but will be reissued to her in 30 days. It is retroactive to aug. 23. Told will be billed for 20%. Pt. Gave me information on her 2nd ins. [facility]. Told they usually will cover portion or all of what medicare doesn't cover. Verbalized understanding.

## 2024-02-05 NOTE — TELEPHONE ENCOUNTER
Called pt. Re: 2nd coverage [facility]. Carly called-coverage no longer current. Lapsed on 1/3/24. Pt. Will call [facility].

## 2024-02-06 ENCOUNTER — HOSPITAL ENCOUNTER (OUTPATIENT)
Dept: CARDIAC REHAB | Age: 64
Setting detail: THERAPIES SERIES
Discharge: HOME | End: 2024-02-06

## 2024-02-06 ENCOUNTER — TELEPHONE (OUTPATIENT)
Dept: CARDIOLOGY CLINIC | Age: 64
End: 2024-02-06

## 2024-02-06 VITALS
BODY MASS INDEX: 44.41 KG/M2 | SYSTOLIC BLOOD PRESSURE: 112 MMHG | HEART RATE: 106 BPM | HEIGHT: 68 IN | DIASTOLIC BLOOD PRESSURE: 68 MMHG | RESPIRATION RATE: 20 BRPM | OXYGEN SATURATION: 97 % | WEIGHT: 293 LBS

## 2024-02-06 ASSESSMENT — PATIENT HEALTH QUESTIONNAIRE - PHQ9
8. MOVING OR SPEAKING SO SLOWLY THAT OTHER PEOPLE COULD HAVE NOTICED. OR THE OPPOSITE, BEING SO FIGETY OR RESTLESS THAT YOU HAVE BEEN MOVING AROUND A LOT MORE THAN USUAL: 0
SUM OF ALL RESPONSES TO PHQ QUESTIONS 1-9: 6
10. IF YOU CHECKED OFF ANY PROBLEMS, HOW DIFFICULT HAVE THESE PROBLEMS MADE IT FOR YOU TO DO YOUR WORK, TAKE CARE OF THINGS AT HOME, OR GET ALONG WITH OTHER PEOPLE: 0
SUM OF ALL RESPONSES TO PHQ QUESTIONS 1-9: 6
SUM OF ALL RESPONSES TO PHQ9 QUESTIONS 1 & 2: 0
SUM OF ALL RESPONSES TO PHQ QUESTIONS 1-9: 6
1. LITTLE INTEREST OR PLEASURE IN DOING THINGS: 0
SUM OF ALL RESPONSES TO PHQ QUESTIONS 1-9: 6
3. TROUBLE FALLING OR STAYING ASLEEP: 3
5. POOR APPETITE OR OVEREATING: 0
6. FEELING BAD ABOUT YOURSELF - OR THAT YOU ARE A FAILURE OR HAVE LET YOURSELF OR YOUR FAMILY DOWN: 0
9. THOUGHTS THAT YOU WOULD BE BETTER OFF DEAD, OR OF HURTING YOURSELF: 0
4. FEELING TIRED OR HAVING LITTLE ENERGY: 2
2. FEELING DOWN, DEPRESSED OR HOPELESS: 0
7. TROUBLE CONCENTRATING ON THINGS, SUCH AS READING THE NEWSPAPER OR WATCHING TELEVISION: 1

## 2024-02-06 NOTE — CARDIO/PULMONARY
[facility] Cardiac Rehabilitation Initial Evaluation    Belkis Morton       1960     [id number]    Share medical information:  Yes   Grace [phone]  Elisa [phone]      Cardiac History    MI, PTCA    Physical Assessment     General Appearance   Height:  172.7 cm (5' 8\")  Weight:  (!) 144.3 kg (318 lb 3.2 oz)   BMI:  48.5  Skin color:         Cardiovascular Assessment  BP Sittin/68  Sittin/74 (left arm)  Standin/74 (left)  Heart rate:   (!) 106 Monitor   Heart sounds:   Regular      Respiratory Assessment  Resp rate: 20                  SpO2:  97 %  Quality/Effort:      Sleep Apnea:  No  CPAP  No  Oxygen  No     Sleeping Habits:  sleeps average of 4 hours a night     Edema:  Yes +1 edema  right only     Orthopedic/Exercise Limitations:  Yes uses a cane at times     Pain:   Do you have pain?:  yes - Bilateral knees uses oxycodone to manage pain        Fall Risk Assessment     History of falling with or without injury: No  Use of ambulatory aid: Yes  Difficulty walking/impaired gait: Yes  Numbness in feet: Yes  Vision changes: No  Dizziness: Yes  Shortness of breath: Yes  Current medications include but not limited to: Anticoagulant, ACE, ARB, Beta  Blocker  Other fall risk : No  Outpatient fall risk intervention strategies: None of these strategies apply    Abuse / Neglect  Physical/behavioral signs of abuse/neglect   No    Do you feel safe at home   Yes    Advanced Directives  Patient has Advanced Directives:  No  Patient given Advanced Directive pack:  Not interested    Vaccinations  Influenza (annual):  No  Pneumonia:  Yes    Pt here for first session of Cardiac Rehab.Reviewed and discussed insurance benefits, pt v/u.  Reviewed Cardiac Rehab Routine and RPE scale, pt v/u.  Developed individual treatment plan and goals set with patient; pt in agreement with plan and no further questions at this time.  Performed six minute walk test.  Next visit scheduled

## 2024-02-06 NOTE — TELEPHONE ENCOUNTER
Patient is experiencing some swelling and pain in right arm / shoulder. No chest pain but would like a call back to see if she can be seen sooner than 2/29/24.    Patients cell [phone]

## 2024-02-07 ENCOUNTER — OFFICE VISIT (OUTPATIENT)
Dept: CARDIOLOGY CLINIC | Age: 64
End: 2024-02-07

## 2024-02-07 VITALS
DIASTOLIC BLOOD PRESSURE: 76 MMHG | BODY MASS INDEX: 48.53 KG/M2 | HEART RATE: 88 BPM | SYSTOLIC BLOOD PRESSURE: 130 MMHG | WEIGHT: 293 LBS

## 2024-02-07 DIAGNOSIS — I25.83 CORONARY ARTERY DISEASE DUE TO LIPID RICH PLAQUE: ICD-10-CM

## 2024-02-07 DIAGNOSIS — I10 ESSENTIAL HYPERTENSION: Primary | ICD-10-CM

## 2024-02-07 DIAGNOSIS — E78.2 MIXED HYPERLIPIDEMIA: ICD-10-CM

## 2024-02-07 DIAGNOSIS — I25.10 CORONARY ARTERY DISEASE DUE TO LIPID RICH PLAQUE: ICD-10-CM

## 2024-02-07 PROCEDURE — 99214 OFFICE O/P EST MOD 30 MIN: CPT | Performed by: INTERNAL MEDICINE

## 2024-02-07 PROCEDURE — 3078F DIAST BP <80 MM HG: CPT | Performed by: INTERNAL MEDICINE

## 2024-02-07 PROCEDURE — 93000 ELECTROCARDIOGRAM COMPLETE: CPT | Performed by: INTERNAL MEDICINE

## 2024-02-07 PROCEDURE — 3075F SYST BP GE 130 - 139MM HG: CPT | Performed by: INTERNAL MEDICINE

## 2024-02-07 PROCEDURE — 1123F ACP DISCUSS/DSCN MKR DOCD: CPT | Performed by: INTERNAL MEDICINE

## 2024-02-07 RX ORDER — METOPROLOL SUCCINATE 25 MG/1
25 TABLET, EXTENDED RELEASE ORAL DAILY
Qty: 90 TABLET | Refills: 3 | Status: SHIPPED | OUTPATIENT
Start: 2024-02-07

## 2024-02-07 RX ORDER — LISINOPRIL AND HYDROCHLOROTHIAZIDE 20; 12.5 MG/1; MG/1
1 TABLET ORAL DAILY
Qty: 90 TABLET | Refills: 3 | Status: SHIPPED | OUTPATIENT
Start: 2024-02-07

## 2024-02-07 RX ORDER — FUROSEMIDE 20 MG/1
20 TABLET ORAL DAILY
Qty: 90 TABLET | Refills: 3 | Status: SHIPPED | OUTPATIENT
Start: 2024-02-07

## 2024-02-07 RX ORDER — AMLODIPINE BESYLATE 10 MG/1
10 TABLET ORAL DAILY
Qty: 90 TABLET | Refills: 3 | Status: SHIPPED | OUTPATIENT
Start: 2024-02-07

## 2024-02-07 RX ORDER — ATORVASTATIN CALCIUM 80 MG/1
80 TABLET, FILM COATED ORAL NIGHTLY
Qty: 90 TABLET | Refills: 3 | Status: SHIPPED | OUTPATIENT
Start: 2024-02-07

## 2024-02-07 RX ORDER — CLOPIDOGREL BISULFATE 75 MG/1
75 TABLET ORAL DAILY
Qty: 90 TABLET | Refills: 3 | Status: SHIPPED | OUTPATIENT
Start: 2024-02-07

## 2024-02-07 NOTE — PROGRESS NOTES
[facility]     Outpatient Cardiology         Patient Name:  Belkis Morton  Requesting Physician: No admitting provider for patient encounter.  Primary Care Physician: ANNE-MARIE Bird MD    Reason for Consultation/Chief Complaint:   [facility], last seen in 2021    HPI:   63 year old female with the history of HTN here for complaints of chest pressure, nausea and shortness of breath.  She was last seen in 2021.  Had chest pain with exertion and emotional stress relieved with sl NTG and/or rest.  Happened at work.    2/7/24;  had anterior NSTEMI with stent to LAD af [facility].  Ecg today better with less TWI and normal R wave progression today.  Fleeting twinges of pain.  Answered questions. Renewed meds.  Check labs.      Histories:     Past Medical History:   has a past medical history of Anxiety, Arthritis, Asthma, Bilateral chronic knee pain, Degenerative disc disease, cervical, Depression, Diabetes, High blood pressure, Hypertriglyceridemia, Lumbar herniated disc, Morbid obesity (HCC), Opiate dependence (HCC), Osteomyelitis (HCC), and Pneumonia.    Surgical History:   has a past surgical history that includes Cardiac catheterization (11/2017).     Social History:   reports that she has never smoked. She has never used smokeless tobacco. She reports that she does not currently use alcohol. She reports that she does not use drugs.     Family History:  No evidence for sudden cardiac death or premature CAD    Medications:     Home Medications:  Were reviewed and are listed in nursing record. and/or listed below    Prior to Admission medications    Medication Sig Start Date End Date Taking? Authorizing Provider   clopidogrel (PLAVIX) 75 MG tablet Take 1 tablet by mouth daily 2/7/24  Yes Benny Gaines MD   atorvastatin (LIPITOR) 80 MG tablet Take 1 tablet by mouth nightly 2/7/24  Yes Benny Gaines MD   metoprolol succinate (TOPROL XL) 25 MG extended release tablet Take 1

## 2024-02-08 ENCOUNTER — TELEPHONE (OUTPATIENT)
Dept: CARDIAC REHAB | Age: 64
End: 2024-02-08

## 2024-02-08 NOTE — TELEPHONE ENCOUNTER
Spoke to patient regarding no verification of Medicare part B. Patient stated that she was told by Medicare that her part B would be retroactive, but she did not have any documentation of this as of yet. Discussed if she wanted to start Cardiac rehab phase 2 , she would need to sign a ABN, decided to wait to begin after she had a letter stating she has part B.

## 2024-02-20 ENCOUNTER — APPOINTMENT (OUTPATIENT)
Dept: GENERAL RADIOLOGY | Age: 64
End: 2024-02-20

## 2024-02-20 ENCOUNTER — HOSPITAL ENCOUNTER (EMERGENCY)
Age: 64
Discharge: HOME | End: 2024-02-20

## 2024-02-20 ENCOUNTER — TELEPHONE (OUTPATIENT)
Dept: INTERNAL MEDICINE CLINIC | Age: 64
End: 2024-02-20

## 2024-02-20 VITALS
RESPIRATION RATE: 12 BRPM | OXYGEN SATURATION: 94 % | HEART RATE: 86 BPM | DIASTOLIC BLOOD PRESSURE: 74 MMHG | BODY MASS INDEX: 45.61 KG/M2 | WEIGHT: 293 LBS | TEMPERATURE: 98 F | SYSTOLIC BLOOD PRESSURE: 152 MMHG

## 2024-02-20 DIAGNOSIS — M79.605 LEFT LEG PAIN: Primary | ICD-10-CM

## 2024-02-20 LAB
ALBUMIN SERPL-MCNC: 4.2 G/DL (ref 3.4–5)
ALBUMIN/GLOB SERPL: 1.1 {RATIO} (ref 1.1–2.2)
ALP SERPL-CCNC: 147 U/L (ref 40–129)
ALT SERPL-CCNC: 14 U/L (ref 10–40)
ANION GAP SERPL CALCULATED.3IONS-SCNC: 16 MMOL/L (ref 3–16)
AST SERPL-CCNC: 16 U/L (ref 15–37)
BASOPHILS # BLD: 0.1 K/UL (ref 0–0.2)
BASOPHILS NFR BLD: 0.8 %
BILIRUB SERPL-MCNC: 0.5 MG/DL (ref 0–1)
BUN SERPL-MCNC: 20 MG/DL (ref 7–20)
CALCIUM SERPL-MCNC: 9.6 MG/DL (ref 8.3–10.6)
CHLORIDE SERPL-SCNC: 95 MMOL/L (ref 99–110)
CO2 SERPL-SCNC: 24 MMOL/L (ref 21–32)
CREAT SERPL-MCNC: 1 MG/DL (ref 0.6–1.2)
DEPRECATED RDW RBC AUTO: 13.5 % (ref 12.4–15.4)
EKG ATRIAL RATE: 88 BPM
EKG DIAGNOSIS: NORMAL
EKG P AXIS: 70 DEGREES
EKG P-R INTERVAL: 178 MS
EKG Q-T INTERVAL: 360 MS
EKG QRS DURATION: 100 MS
EKG QTC CALCULATION (BAZETT): 435 MS
EKG R AXIS: 11 DEGREES
EKG T AXIS: 8 DEGREES
EKG VENTRICULAR RATE: 88 BPM
EOSINOPHIL # BLD: 0.2 K/UL (ref 0–0.6)
EOSINOPHIL NFR BLD: 3 %
GFR SERPLBLD CREATININE-BSD FMLA CKD-EPI: >60 ML/MIN/{1.73_M2}
GLUCOSE BLD-MCNC: 384 MG/DL (ref 70–99)
GLUCOSE BLD-MCNC: 413 MG/DL (ref 70–99)
GLUCOSE SERPL-MCNC: 417 MG/DL (ref 70–99)
HCT VFR BLD AUTO: 34.4 % (ref 36–48)
HGB BLD-MCNC: 11.5 G/DL (ref 12–16)
LYMPHOCYTES # BLD: 2.5 K/UL (ref 1–5.1)
LYMPHOCYTES NFR BLD: 37.2 %
MCH RBC QN AUTO: 28.9 PG (ref 26–34)
MCHC RBC AUTO-ENTMCNC: 33.3 G/DL (ref 31–36)
MCV RBC AUTO: 86.8 FL (ref 80–100)
MONOCYTES # BLD: 0.6 K/UL (ref 0–1.3)
MONOCYTES NFR BLD: 8.4 %
NEUTROPHILS # BLD: 3.4 K/UL (ref 1.7–7.7)
NEUTROPHILS NFR BLD: 50.6 %
PERFORMED ON: ABNORMAL
PERFORMED ON: ABNORMAL
PLATELET # BLD AUTO: 285 K/UL (ref 135–450)
PMV BLD AUTO: 7.8 FL (ref 5–10.5)
POTASSIUM SERPL-SCNC: 3.9 MMOL/L (ref 3.5–5.1)
PROT SERPL-MCNC: 8 G/DL (ref 6.4–8.2)
RBC # BLD AUTO: 3.97 M/UL (ref 4–5.2)
SODIUM SERPL-SCNC: 135 MMOL/L (ref 136–145)
TROPONIN, HIGH SENSITIVITY: 15 NG/L (ref 0–14)
TROPONIN, HIGH SENSITIVITY: 16 NG/L (ref 0–14)
WBC # BLD AUTO: 6.7 K/UL (ref 4–11)

## 2024-02-20 PROCEDURE — 93971 EXTREMITY STUDY: CPT

## 2024-02-20 PROCEDURE — 84484 ASSAY OF TROPONIN QUANT: CPT

## 2024-02-20 PROCEDURE — 2580000003 HC RX 258: Performed by: PHYSICIAN ASSISTANT

## 2024-02-20 PROCEDURE — 73552 X-RAY EXAM OF FEMUR 2/>: CPT

## 2024-02-20 PROCEDURE — 80053 COMPREHEN METABOLIC PANEL: CPT

## 2024-02-20 PROCEDURE — 71045 X-RAY EXAM CHEST 1 VIEW: CPT

## 2024-02-20 PROCEDURE — 93010 ELECTROCARDIOGRAM REPORT: CPT | Performed by: INTERNAL MEDICINE

## 2024-02-20 PROCEDURE — 96372 THER/PROPH/DIAG INJ SC/IM: CPT

## 2024-02-20 PROCEDURE — 73502 X-RAY EXAM HIP UNI 2-3 VIEWS: CPT

## 2024-02-20 PROCEDURE — 99285 EMERGENCY DEPT VISIT HI MDM: CPT

## 2024-02-20 PROCEDURE — 6370000000 HC RX 637 (ALT 250 FOR IP): Performed by: PHYSICIAN ASSISTANT

## 2024-02-20 PROCEDURE — 93005 ELECTROCARDIOGRAM TRACING: CPT | Performed by: PHYSICIAN ASSISTANT

## 2024-02-20 PROCEDURE — 85025 COMPLETE CBC W/AUTO DIFF WBC: CPT

## 2024-02-20 RX ORDER — 0.9 % SODIUM CHLORIDE 0.9 %
1000 INTRAVENOUS SOLUTION INTRAVENOUS ONCE
Status: COMPLETED | OUTPATIENT
Start: 2024-02-20 | End: 2024-02-20

## 2024-02-20 RX ORDER — IBUPROFEN 600 MG/1
600 TABLET ORAL 3 TIMES DAILY PRN
Qty: 30 TABLET | Refills: 0 | Status: SHIPPED | OUTPATIENT
Start: 2024-02-20

## 2024-02-20 RX ORDER — HYDROCODONE BITARTRATE AND ACETAMINOPHEN 7.5; 325 MG/1; MG/1
1 TABLET ORAL
Status: COMPLETED | OUTPATIENT
Start: 2024-02-20 | End: 2024-02-20

## 2024-02-20 RX ORDER — ONDANSETRON 4 MG/1
4 TABLET, ORALLY DISINTEGRATING ORAL ONCE
Status: COMPLETED | OUTPATIENT
Start: 2024-02-20 | End: 2024-02-20

## 2024-02-20 RX ADMIN — SODIUM CHLORIDE 1000 ML: 9 INJECTION, SOLUTION INTRAVENOUS at 10:42

## 2024-02-20 RX ADMIN — ONDANSETRON 4 MG: 4 TABLET, ORALLY DISINTEGRATING ORAL at 10:25

## 2024-02-20 RX ADMIN — INSULIN HUMAN 15 UNITS: 100 INJECTION, SOLUTION PARENTERAL at 13:42

## 2024-02-20 RX ADMIN — HYDROCODONE BITARTRATE AND ACETAMINOPHEN 1 TABLET: 7.5; 325 TABLET ORAL at 10:25

## 2024-02-20 ASSESSMENT — ENCOUNTER SYMPTOMS
DIARRHEA: 0
COLOR CHANGE: 0
WHEEZING: 0
EYE PAIN: 0
CONSTIPATION: 0
SHORTNESS OF BREATH: 0
VOMITING: 0
ABDOMINAL PAIN: 0
RHINORRHEA: 0
SORE THROAT: 0
NAUSEA: 0
COUGH: 0
BACK PAIN: 0

## 2024-02-20 ASSESSMENT — PAIN SCALES - GENERAL
PAINLEVEL_OUTOF10: 10
PAINLEVEL_OUTOF10: 10

## 2024-02-20 ASSESSMENT — PAIN - FUNCTIONAL ASSESSMENT: PAIN_FUNCTIONAL_ASSESSMENT: 0-10

## 2024-02-20 NOTE — ED NOTES
Pt reported nausea while in vascular lab  Pt reports she became dizzy as well.   No emesis   Episode subsided.

## 2024-02-20 NOTE — ED PROVIDER NOTES
UNIT/ML injection pen Inject 52 Units into the skin nightly, Disp-5 Adjustable Dose Pre-filled Pen Syringe, R-3DX Code Needed  PATIENT RQSTD.Normal      Insulin Pen Needle 32G X 4 MM MISC Disp-150 each, R-5, NormalUse to give LA and SA insulin 5 x daily      insulin lispro (HUMALOG KWIKPEN) 200 UNIT/ML SOPN pen Inject 10 Units into the skin 3 times daily as needed for High Blood Sugar (sliding scale: add 2 units insulin for every 50 mg/dL > 150 mg/dL; 151-199 add 2 units, 200-249 add 4 units, 250-299 add 6 units, 300-349 add 8 units, 350+ add 10 units), Disp-5  Adjustable Dose Pre-filled Pen Syringe, R-2Normal      blood glucose monitor kit and supplies Dispense sufficient amount for indicated testing frequency plus additional to accommodate PRN testing needs. Dispense all needed supplies to include: monitor, strips, lancing device, lancets, control solutions, alcohol swabs., Disp-1 kit, R-0, Normal      Lancets MISC 3 TIMES DAILY Starting Mon 1/8/2024, Disp-100 each, R-3, Normal      blood glucose monitor strips Test 3 times a day & as needed for symptoms of irregular blood glucose. Dispense sufficient amount for indicated testing frequency plus additional to accommodate PRN testing needs., Disp-100 strip, R-3, Normal      albuterol sulfate HFA (PROAIR HFA) 108 (90 Base) MCG/ACT inhaler Inhale 2 puffs into the lungs every 6 hours as needed for Wheezing, Disp-1 each, R-3Normal      nitroGLYCERIN (NITROSTAT) 0.4 MG SL tablet PLACE 1 TABLET UNDER TONGUE EVERY 5 MINUTES AS NEEDED FOR CHEST PAIN. MAX OF 3 DOSES, Disp-25 tablet, R-3Normal      oxyCODONE-acetaminophen (PERCOCET)  MG per tablet Take 1 tablet by mouth 3 times daily as needed for Pain. [facility]      Blood Pressure KIT Disp-1 kit,R-0, NormalUse as directed      UNABLE TO FIND Glucometer #1, E11.9, testing TID. ON INSULIN.  Home pt.  DISPENSE METER BEST COVERED BY INSURANCE, Disp-1 each, R-0Print             ALLERGIES

## 2024-02-20 NOTE — TELEPHONE ENCOUNTER
Pt called on call provider at 7:30 this morning in regards to having leg pain for about 3 days. Pt stated she was concerned about it since she was in the hospital on Jan 6th. Per Dr. French he would like Nurse to triage Pt. Pt wants to know if she could get in to see Dr. Bird today or if she should go to the ER.

## 2024-02-20 NOTE — TELEPHONE ENCOUNTER
Pt was called. She was offered an appt this morning with a covering provider but it was advised for pt to go to the ER as she described the pain as \"excruciating\". Pain has been going on x 3 days and is getting much worse. Pt states she is going to go to [facility] ER now.

## 2024-02-21 ENCOUNTER — OFFICE VISIT (OUTPATIENT)
Dept: INTERNAL MEDICINE CLINIC | Age: 64
End: 2024-02-21

## 2024-02-21 VITALS
HEIGHT: 68 IN | BODY MASS INDEX: 44.41 KG/M2 | WEIGHT: 293 LBS | SYSTOLIC BLOOD PRESSURE: 110 MMHG | HEART RATE: 96 BPM | OXYGEN SATURATION: 98 % | DIASTOLIC BLOOD PRESSURE: 70 MMHG

## 2024-02-21 DIAGNOSIS — M79.605 LEFT LEG PAIN: ICD-10-CM

## 2024-02-21 DIAGNOSIS — I25.83 CORONARY ARTERY DISEASE DUE TO LIPID RICH PLAQUE: ICD-10-CM

## 2024-02-21 DIAGNOSIS — I25.10 CORONARY ARTERY DISEASE DUE TO LIPID RICH PLAQUE: ICD-10-CM

## 2024-02-21 DIAGNOSIS — E78.2 MIXED HYPERLIPIDEMIA: ICD-10-CM

## 2024-02-21 DIAGNOSIS — Z79.4 TYPE 2 DIABETES MELLITUS WITH DIABETIC NEUROPATHY, WITH LONG-TERM CURRENT USE OF INSULIN (HCC): ICD-10-CM

## 2024-02-21 DIAGNOSIS — I10 ESSENTIAL HYPERTENSION: ICD-10-CM

## 2024-02-21 DIAGNOSIS — M54.32 SCIATICA WITHOUT BACK PAIN, LEFT: ICD-10-CM

## 2024-02-21 DIAGNOSIS — Z09 HOSPITAL DISCHARGE FOLLOW-UP: Primary | ICD-10-CM

## 2024-02-21 DIAGNOSIS — E11.40 TYPE 2 DIABETES MELLITUS WITH DIABETIC NEUROPATHY, WITH LONG-TERM CURRENT USE OF INSULIN (HCC): ICD-10-CM

## 2024-02-21 DIAGNOSIS — D64.9 MILD ANEMIA: ICD-10-CM

## 2024-02-21 LAB
BASOPHILS # BLD: 0 K/UL (ref 0–0.2)
BASOPHILS NFR BLD: 0.2 %
CHOLEST SERPL-MCNC: 106 MG/DL (ref 0–199)
DEPRECATED RDW RBC AUTO: 13.2 % (ref 12.4–15.4)
EOSINOPHIL # BLD: 0.3 K/UL (ref 0–0.6)
EOSINOPHIL NFR BLD: 4.5 %
HCT VFR BLD AUTO: 31.5 % (ref 36–48)
HDLC SERPL-MCNC: 45 MG/DL (ref 40–60)
HGB BLD-MCNC: 11.1 G/DL (ref 12–16)
LDL CHOLESTEROL CALCULATED: 43 MG/DL
LYMPHOCYTES # BLD: 2.5 K/UL (ref 1–5.1)
LYMPHOCYTES NFR BLD: 37.7 %
MCH RBC QN AUTO: 29.8 PG (ref 26–34)
MCHC RBC AUTO-ENTMCNC: 35.1 G/DL (ref 31–36)
MCV RBC AUTO: 84.9 FL (ref 80–100)
MONOCYTES # BLD: 0.5 K/UL (ref 0–1.3)
MONOCYTES NFR BLD: 7.1 %
NEUTROPHILS # BLD: 3.4 K/UL (ref 1.7–7.7)
NEUTROPHILS NFR BLD: 50.5 %
PLATELET # BLD AUTO: 271 K/UL (ref 135–450)
PMV BLD AUTO: 8.5 FL (ref 5–10.5)
RBC # BLD AUTO: 3.71 M/UL (ref 4–5.2)
TRIGL SERPL-MCNC: 88 MG/DL (ref 0–150)
TSH SERPL DL<=0.005 MIU/L-ACNC: 0.75 UIU/ML (ref 0.27–4.2)
VLDLC SERPL CALC-MCNC: 18 MG/DL
WBC # BLD AUTO: 6.7 K/UL (ref 4–11)

## 2024-02-21 PROCEDURE — 3074F SYST BP LT 130 MM HG: CPT | Performed by: NURSE PRACTITIONER

## 2024-02-21 PROCEDURE — 3078F DIAST BP <80 MM HG: CPT | Performed by: NURSE PRACTITIONER

## 2024-02-21 PROCEDURE — 99214 OFFICE O/P EST MOD 30 MIN: CPT | Performed by: NURSE PRACTITIONER

## 2024-02-21 PROCEDURE — 1123F ACP DISCUSS/DSCN MKR DOCD: CPT | Performed by: NURSE PRACTITIONER

## 2024-02-22 DIAGNOSIS — D64.9 ANEMIA, UNSPECIFIED TYPE: Primary | ICD-10-CM

## 2024-02-22 ASSESSMENT — ENCOUNTER SYMPTOMS
BACK PAIN: 1
SHORTNESS OF BREATH: 0
GASTROINTESTINAL NEGATIVE: 1
RESPIRATORY NEGATIVE: 1

## 2024-02-22 NOTE — RESULT ENCOUNTER NOTE
Thyroid result looks good  Mild anemia-slightly lower than last time.  Please make appointment with me in couple of months.  Repeat CBC before next visit.  Patient should get additional labs as ordered.

## 2024-02-22 NOTE — PROGRESS NOTES
PCP:  Medical Oncology: Cydney Doing  Radiation: grass  Other:        pTisNx  STAGE:  0 left breast cancer      Ms. Wei Monae is a 65y.o.-year-old woman who initially presented to me with  left breast cancer. Since her last encounter Ms. Wei Monae has been doing quite well. Her adjuvant treatment has included radiation therapy which she tolerated well. ER was weakly positive and therefore she is not taking endocrine therapy. She has no new complaints today. INTERVAL HISTORY:    On 9/18/2019 she underwent a left breast partial mastectomy. Pathology identified at least 9 mm of high-grade DCIS. ER weakly positive. Anterior margin is focally involved however additional anterior margin was taken at the time of the procedure and therefore the anterior (and remaining) margins are negative. On 11/18/2019 she completed adjuvant radiation. On 8/17/2020 she underwent bilateral breast imaging. Postsurgical changes are noted in the left breast.  There are no new concerning findings suggestive of malignancy in either breast.  BI-RADS 2. On 2/25/2021 she underwent left breast imaging. Stable postsurgical changes are noted in the left breast.  There are no new concerning findings suggestive of malignancy. BI-RADS 2. On 9/2/2021 she underwent bilateral screening mammography. Stable postsurgical and treatment related changes are noticed in the left breast.  There are no new concerning findings suggestive of malignancy in either breast.  BI-RADS 2. Exam:  Physical exam has been reviewed and updated  General: no acute distress  Breast:  The patient was examined in the upright and supine position. There is a well healed scar on the left breast.  There is unchanged firmness in her surgical bed.  (9/2/21). There is a similarly well healed ipsilateral axillary scar. There are expected but minimal  post surgical and radiation related changes. She has good range of motion with her arm.   Her contralateral breast shows SUBJECTIVE:    Patient ID: Belkis Morton is a 63 y.o. female.    CC: Left leg pain    HPI: The patient presents the office today for emergency department follow-up of left leg pain.    Patient reports being in her normal state of health when she suddenly developed left posterior upper leg pain which radiated down her leg.  She denied any known injury or trauma.  Pain was severe and she went to [facility] emergency department with concern she may have a \"vascular problem.\"    Femur x-ray showed no acute abnormality.  Hip x-ray showed no acute abnormality but there was noted to be bilateral hip arthritis and degenerative changes of the lumbar spine and knee.  Vascular study was negative for deep or superficial venous thrombosis.    Today, the patient reports her leg pain is completely resolved.  She denies any swelling.  She denies any rash.  She denies any changes to bowel or bladder.  We discussed possible causes and patient recalls a remote history of sciatica which had similar symptoms of leg pain.      Past Medical History:   Diagnosis Date    Anxiety     Arthritis     Asthma     as child    Bilateral chronic knee pain     sees pain management    Degenerative disc disease, cervical MRI 2011    Multilevel cervical degenerative this disease most significant at the C7-T1 level where there is probable impingement of the exiting right C8 nerve root caused by disc herniation.    Depression     Diabetes     Amira Espinosa, NP at     High blood pressure     Hypertriglyceridemia     Lumbar herniated disc MRI 2011    L5-S1    Morbid obesity (HCC)     Opiate dependence (HCC)     Osteomyelitis (HCC)     Pneumonia     as child        Current Outpatient Medications   Medication Sig Dispense Refill    ibuprofen (ADVIL;MOTRIN) 600 MG tablet Take 1 tablet by mouth 3 times daily as needed for Pain 30 tablet 0    clopidogrel (PLAVIX) 75 MG tablet Take 1 tablet by mouth daily 90 tablet 3    atorvastatin (LIPITOR) 80 MG  no new masses or changes in breast contour. There were no skin changes of the breast or nipple areolar complex. There was no nipple inversion or discharge. Respiratory: respirations are non-labored and there is no audible distress  Cardiovascular: regular rate, extremities appear well perfused  Neurologic: alert, oriented      Assessment/Plan:  pTisNx  STAGE:  0 left breast cancer  ER weakly positive  S/p PM  S/p XRT      I reviewed her most recent breast imaging and physical exam findings. There are no current signs of recurrence. Signs/symptoms of recurrence were reviewed. She verbalizes understanding that she should notify our office if she identifies any abnormalities on self evaluation as it may require further workup. I encouraged her to continue self breast evaluation. Follow up surveillance was discussed. We will continue to follow her closely with exams and mammography long-term. She is planning to discontinue her follow-up with medical oncology in the near future. She will continue to stay connected with our office. Our plan at this time is to follow up with surgical breast oncology office in 1 year for clinical exam and bilateral breast imaging. All of the patient's questions were answered at this time however, she was encouraged to call the office with any further inquiries. Approximately 20 minutes of time were spent in preparation, direct patient contact, care coordination, documentation and activities otherwise related to this encounter.

## 2024-02-27 DIAGNOSIS — D58.2 HEMOGLOBIN C (HB-C) (HCC): Primary | ICD-10-CM

## 2024-02-27 LAB
HGB FRACT BLD ELPH-IMP: NORMAL
HGB FRACT BLD ELPH-IMP: NORMAL

## 2024-03-05 ENCOUNTER — PATIENT MESSAGE (OUTPATIENT)
Dept: INTERNAL MEDICINE CLINIC | Age: 64
End: 2024-03-05

## 2024-03-05 DIAGNOSIS — E11.40 TYPE 2 DIABETES MELLITUS WITH DIABETIC NEUROPATHY, WITH LONG-TERM CURRENT USE OF INSULIN (HCC): ICD-10-CM

## 2024-03-05 DIAGNOSIS — Z79.4 TYPE 2 DIABETES MELLITUS WITH DIABETIC NEUROPATHY, WITH LONG-TERM CURRENT USE OF INSULIN (HCC): ICD-10-CM

## 2024-03-05 DIAGNOSIS — E11.10 DIABETIC KETOACIDOSIS WITHOUT COMA ASSOCIATED WITH TYPE 2 DIABETES MELLITUS (HCC): ICD-10-CM

## 2024-03-05 NOTE — TELEPHONE ENCOUNTER
From: Belkis Morton  To: Dr. ANNE-MARIE Bird  Sent: 3/5/2024 3:22 PM EST  Subject: Refills    I need refills but My Chart says they can't be refilled through My Chart. I need Lantus, glucose strips. I have still not been able to get neither Trulicity nor Ozempic because of the cost.

## 2024-03-05 NOTE — TELEPHONE ENCOUNTER
Medication:  insulin glargine (LANTUS SOLOSTAR) 100 UNIT/ML injection pen (Last filled by Dr. Smith on 1/9/24, Dr. Bird has filled this medication previously.)  blood glucose monitor strips (Last filled by Dr. Smith on 1/8/24, Dr. Bird has never filled this medication)    Patient Pharmacy: [facility]    Patient Phone Number: [phone] (home)     Last appt: 2/21/2024   Next appt: Visit date not found    Last OARRS:       2/24/2017     4:58 PM   RX Monitoring   Attestation The Prescription Monitoring Report for this patient was reviewed today.   Periodic Controlled Substance Monitoring Possible medication side effects, risk of tolerance and/or dependence, and alternative treatments discussed;No signs of potential drug abuse or diversion identified.       Last Labs DM:   Lab Results   Component Value Date/Time    LABA1C 8.7 01/08/2024 07:27 AM     Last Lipid:   Lab Results   Component Value Date/Time    CHOL 186 10/19/2020 10:17 AM    TRIG 99 10/19/2020 10:17 AM    HDL 45 02/21/2024 01:56 PM    LDLCALC 43 02/21/2024 01:56 PM     Last PSA: No results found for: \"PSA\"  Last Thyroid: No results found for: \"TSH\", \"FT3\", \"Q0IYKSJ\", \"T4FREE\", \"F6EWKNT\"

## 2024-03-06 RX ORDER — INSULIN GLARGINE 100 [IU]/ML
52 INJECTION, SOLUTION SUBCUTANEOUS NIGHTLY
Qty: 5 ADJUSTABLE DOSE PRE-FILLED PEN SYRINGE | Refills: 1 | Status: SHIPPED | OUTPATIENT
Start: 2024-03-06

## 2024-03-06 RX ORDER — GLUCOSAMINE HCL/CHONDROITIN SU 500-400 MG
CAPSULE ORAL
Qty: 300 STRIP | Refills: 1 | Status: SHIPPED | OUTPATIENT
Start: 2024-03-06

## 2024-04-01 DIAGNOSIS — I10 ESSENTIAL HYPERTENSION: ICD-10-CM

## 2024-04-01 RX ORDER — LISINOPRIL AND HYDROCHLOROTHIAZIDE 20; 12.5 MG/1; MG/1
1 TABLET ORAL DAILY
Qty: 90 TABLET | Refills: 0 | Status: SHIPPED | OUTPATIENT
Start: 2024-04-01

## 2024-04-01 NOTE — TELEPHONE ENCOUNTER
Last OV: 2/21/2024  Next OV: Visit date not found    Next appointment due:04/18/24    Last fill:02/27/24  Refills:3   #90

## 2024-04-06 DIAGNOSIS — I25.118 CORONARY ARTERY DISEASE OF NATIVE HEART WITH STABLE ANGINA PECTORIS, UNSPECIFIED VESSEL OR LESION TYPE (HCC): ICD-10-CM

## 2024-04-06 DIAGNOSIS — R00.2 PALPITATION: ICD-10-CM

## 2024-04-06 DIAGNOSIS — I10 ESSENTIAL HYPERTENSION: ICD-10-CM

## 2024-04-08 RX ORDER — METOPROLOL TARTRATE 50 MG/1
TABLET, FILM COATED ORAL
Qty: 180 TABLET | Refills: 0 | OUTPATIENT
Start: 2024-04-08

## 2024-04-10 ENCOUNTER — TELEPHONE (OUTPATIENT)
Dept: INTERNAL MEDICINE CLINIC | Age: 64
End: 2024-04-10

## 2024-04-11 NOTE — TELEPHONE ENCOUNTER
There was a PA received VIA patient calls for TRULICITY 3 MG, but there is not a current RX on file.    If you want PA please submit new RX, and resend this PA request back to the pool    If this requires a response please respond to the pool ( P MHCX PSC MEDICATION PRE-AUTH).      Thank you please advise patient.

## 2024-04-12 ENCOUNTER — OFFICE VISIT (OUTPATIENT)
Dept: CARDIOLOGY CLINIC | Age: 64
End: 2024-04-12

## 2024-04-12 VITALS
BODY MASS INDEX: 47.38 KG/M2 | DIASTOLIC BLOOD PRESSURE: 80 MMHG | WEIGHT: 293 LBS | SYSTOLIC BLOOD PRESSURE: 134 MMHG | HEART RATE: 99 BPM

## 2024-04-12 DIAGNOSIS — I10 HTN (HYPERTENSION), BENIGN: ICD-10-CM

## 2024-04-12 DIAGNOSIS — G47.33 OSA (OBSTRUCTIVE SLEEP APNEA): ICD-10-CM

## 2024-04-12 DIAGNOSIS — I25.10 CORONARY ARTERY DISEASE INVOLVING NATIVE CORONARY ARTERY OF NATIVE HEART WITHOUT ANGINA PECTORIS: ICD-10-CM

## 2024-04-12 DIAGNOSIS — R00.2 PALPITATION: Primary | ICD-10-CM

## 2024-04-12 DIAGNOSIS — E78.5 HYPERLIPIDEMIA, UNSPECIFIED HYPERLIPIDEMIA TYPE: ICD-10-CM

## 2024-04-12 DIAGNOSIS — R00.2 PALPITATIONS: ICD-10-CM

## 2024-04-12 RX ORDER — METOPROLOL SUCCINATE 50 MG/1
50 TABLET, EXTENDED RELEASE ORAL DAILY
Qty: 90 TABLET | Refills: 3 | Status: SHIPPED | OUTPATIENT
Start: 2024-04-12

## 2024-04-12 NOTE — PROGRESS NOTES
[facility]     Outpatient Cardiology         Patient Name:  Belkis Morton  Requesting Physician: No admitting provider for patient encounter.  Primary Care Physician: ANNE-MARIE Bird MD    Reason for Consultation/Chief Complaint:   [facility] care, last seen in 2021    HPI:   63 year old female with the history of HTN here for complaints of chest pressure, nausea and shortness of breath.  She was last seen in 2021.  Had chest pain with exertion and emotional stress relieved with sl NTG and/or rest.  Happened at work.    2/7/24;  had anterior NSTEMI with stent to LAD af [facility].  Ecg today better with less TWI and normal R wave progression today.     4/12/24:  ECG TODAY LOOKS GOOD WITH NO ISCHEMIC CHANGES.  HAS SOME PALPITATIONS IN AM.  NEEDS 50MG TOPROL AND INSTRUCTED TO TAKE 1/2 IN AM AND 1/2 IN PM.      Histories:     Past Medical History:   has a past medical history of Anxiety, Arthritis, Asthma, Bilateral chronic knee pain, Degenerative disc disease, cervical, Depression, Diabetes, High blood pressure, Hypertriglyceridemia, Lumbar herniated disc, Morbid obesity (HCC), Opiate dependence (HCC), Osteomyelitis ([facility]), and Pneumonia.    Surgical History:   has a past surgical history that includes Cardiac catheterization (11/2017).     Social History:   reports that she has never smoked. She has never used smokeless tobacco. She reports that she does not currently use alcohol. She reports that she does not use drugs.     Family History:  No evidence for sudden cardiac death or premature CAD    Medications:     Home Medications:  Were reviewed and are listed in nursing record. and/or listed below    Prior to Admission medications    Medication Sig Start Date End Date Taking? Authorizing Provider   metoprolol succinate (TOPROL XL) 50 MG extended release tablet Take 1 tablet by mouth daily 4/12/24  Yes Benny Gaines MD   lisinopril-hydroCHLOROthiazide (PRINZIDE;ZESTORETIC) 20-12.5 MG

## 2024-04-18 ENCOUNTER — TELEPHONE (OUTPATIENT)
Dept: INTERNAL MEDICINE CLINIC | Age: 64
End: 2024-04-18

## 2024-05-02 RX ORDER — METOPROLOL SUCCINATE 50 MG/1
50 TABLET, EXTENDED RELEASE ORAL DAILY
Qty: 90 TABLET | Refills: 2 | Status: SHIPPED | OUTPATIENT
Start: 2024-05-02

## 2024-05-30 ENCOUNTER — TELEPHONE (OUTPATIENT)
Dept: INTERNAL MEDICINE CLINIC | Age: 64
End: 2024-05-30

## 2024-05-30 NOTE — TELEPHONE ENCOUNTER
Called pt to get them scheduled lvm due to her dropping a form for financial help pt hasn't been seen since jan by dr jackman for hfu. Pt will need to come in for ov for form to be filled out. Thank you

## 2024-06-13 ENCOUNTER — OFFICE VISIT (OUTPATIENT)
Dept: INTERNAL MEDICINE CLINIC | Age: 64
End: 2024-06-13

## 2024-06-13 VITALS
SYSTOLIC BLOOD PRESSURE: 134 MMHG | WEIGHT: 293 LBS | BODY MASS INDEX: 45.74 KG/M2 | HEART RATE: 89 BPM | OXYGEN SATURATION: 99 % | DIASTOLIC BLOOD PRESSURE: 80 MMHG | TEMPERATURE: 96.3 F

## 2024-06-13 DIAGNOSIS — Z79.4 TYPE 2 DIABETES MELLITUS WITH DIABETIC NEUROPATHY, WITH LONG-TERM CURRENT USE OF INSULIN (HCC): ICD-10-CM

## 2024-06-13 DIAGNOSIS — E11.65 UNCONTROLLED TYPE 2 DIABETES MELLITUS WITH HYPERGLYCEMIA (HCC): ICD-10-CM

## 2024-06-13 DIAGNOSIS — E66.09 OBESITY DUE TO EXCESS CALORIES, UNSPECIFIED CLASSIFICATION, UNSPECIFIED WHETHER SERIOUS COMORBIDITY PRESENT: ICD-10-CM

## 2024-06-13 DIAGNOSIS — R10.9 ACUTE RIGHT FLANK PAIN: ICD-10-CM

## 2024-06-13 DIAGNOSIS — Z12.11 SCREEN FOR COLON CANCER: ICD-10-CM

## 2024-06-13 DIAGNOSIS — Z79.4 TYPE 2 DIABETES MELLITUS WITH DIABETIC NEUROPATHY, WITH LONG-TERM CURRENT USE OF INSULIN (HCC): Primary | ICD-10-CM

## 2024-06-13 DIAGNOSIS — E11.40 TYPE 2 DIABETES MELLITUS WITH DIABETIC NEUROPATHY, WITH LONG-TERM CURRENT USE OF INSULIN (HCC): Primary | ICD-10-CM

## 2024-06-13 DIAGNOSIS — I10 ESSENTIAL HYPERTENSION: ICD-10-CM

## 2024-06-13 DIAGNOSIS — G47.33 OBSTRUCTIVE SLEEP APNEA SYNDROME: ICD-10-CM

## 2024-06-13 DIAGNOSIS — E11.40 TYPE 2 DIABETES MELLITUS WITH DIABETIC NEUROPATHY, WITH LONG-TERM CURRENT USE OF INSULIN (HCC): ICD-10-CM

## 2024-06-13 LAB
ALBUMIN SERPL-MCNC: 4 G/DL (ref 3.4–5)
ALBUMIN/GLOB SERPL: 1.4 {RATIO} (ref 1.1–2.2)
ALP SERPL-CCNC: 127 U/L (ref 40–129)
ALT SERPL-CCNC: 14 U/L (ref 10–40)
ANION GAP SERPL CALCULATED.3IONS-SCNC: 12 MMOL/L (ref 3–16)
AST SERPL-CCNC: 12 U/L (ref 15–37)
BILIRUB SERPL-MCNC: 0.3 MG/DL (ref 0–1)
BILIRUB UR QL STRIP.AUTO: NEGATIVE
BUN SERPL-MCNC: 18 MG/DL (ref 7–20)
CALCIUM SERPL-MCNC: 9.4 MG/DL (ref 8.3–10.6)
CHLORIDE SERPL-SCNC: 98 MMOL/L (ref 99–110)
CHOLEST SERPL-MCNC: 146 MG/DL (ref 0–199)
CLARITY UR: CLEAR
CO2 SERPL-SCNC: 26 MMOL/L (ref 21–32)
COLOR UR: YELLOW
CREAT SERPL-MCNC: 0.9 MG/DL (ref 0.6–1.2)
DEPRECATED RDW RBC AUTO: 15.1 % (ref 12.4–15.4)
GFR SERPLBLD CREATININE-BSD FMLA CKD-EPI: 70 ML/MIN/{1.73_M2}
GLUCOSE SERPL-MCNC: 325 MG/DL (ref 70–99)
GLUCOSE UR STRIP.AUTO-MCNC: >=1000 MG/DL
HCT VFR BLD AUTO: 33.6 % (ref 36–48)
HDLC SERPL-MCNC: 64 MG/DL (ref 40–60)
HGB BLD-MCNC: 11.3 G/DL (ref 12–16)
HGB UR QL STRIP.AUTO: NEGATIVE
KETONES UR STRIP.AUTO-MCNC: NEGATIVE MG/DL
LDLC SERPL CALC-MCNC: 61 MG/DL
LEUKOCYTE ESTERASE UR QL STRIP.AUTO: NEGATIVE
MCH RBC QN AUTO: 28.2 PG (ref 26–34)
MCHC RBC AUTO-ENTMCNC: 33.7 G/DL (ref 31–36)
MCV RBC AUTO: 83.6 FL (ref 80–100)
NITRITE UR QL STRIP.AUTO: NEGATIVE
PH UR STRIP.AUTO: 6 [PH] (ref 5–8)
PLATELET # BLD AUTO: 286 K/UL (ref 135–450)
PMV BLD AUTO: 8.8 FL (ref 5–10.5)
POTASSIUM SERPL-SCNC: 4.3 MMOL/L (ref 3.5–5.1)
PROT SERPL-MCNC: 6.9 G/DL (ref 6.4–8.2)
PROT UR STRIP.AUTO-MCNC: NEGATIVE MG/DL
RBC # BLD AUTO: 4.02 M/UL (ref 4–5.2)
SODIUM SERPL-SCNC: 136 MMOL/L (ref 136–145)
SP GR UR STRIP.AUTO: 1.03 (ref 1–1.03)
TRIGL SERPL-MCNC: 107 MG/DL (ref 0–150)
TSH SERPL DL<=0.005 MIU/L-ACNC: 0.91 UIU/ML (ref 0.27–4.2)
UA COMPLETE W REFLEX CULTURE PNL UR: ABNORMAL
UA DIPSTICK W REFLEX MICRO PNL UR: ABNORMAL
URN SPEC COLLECT METH UR: ABNORMAL
UROBILINOGEN UR STRIP-ACNC: 0.2 E.U./DL
VLDLC SERPL CALC-MCNC: 21 MG/DL
WBC # BLD AUTO: 6.3 K/UL (ref 4–11)

## 2024-06-13 RX ORDER — PREGABALIN 100 MG/1
100 CAPSULE ORAL 2 TIMES DAILY
COMMUNITY

## 2024-06-13 SDOH — ECONOMIC STABILITY: FOOD INSECURITY: WITHIN THE PAST 12 MONTHS, THE FOOD YOU BOUGHT JUST DIDN'T LAST AND YOU DIDN'T HAVE MONEY TO GET MORE.: NEVER TRUE

## 2024-06-13 SDOH — ECONOMIC STABILITY: INCOME INSECURITY: HOW HARD IS IT FOR YOU TO PAY FOR THE VERY BASICS LIKE FOOD, HOUSING, MEDICAL CARE, AND HEATING?: NOT HARD AT ALL

## 2024-06-13 SDOH — ECONOMIC STABILITY: FOOD INSECURITY: WITHIN THE PAST 12 MONTHS, YOU WORRIED THAT YOUR FOOD WOULD RUN OUT BEFORE YOU GOT MONEY TO BUY MORE.: NEVER TRUE

## 2024-06-13 ASSESSMENT — ENCOUNTER SYMPTOMS
SHORTNESS OF BREATH: 0
PHOTOPHOBIA: 0
WHEEZING: 0
VOICE CHANGE: 0
NAUSEA: 0
CONSTIPATION: 0
VOMITING: 0
TROUBLE SWALLOWING: 0

## 2024-06-13 NOTE — PROGRESS NOTES
2 MG/DOSE, (OZEMPIC) 8 MG/3ML SOPN sc injection; Inject 2 mg into the skin every 7 days  -     CBC; Future  -     Comprehensive Metabolic Panel; Future  -     TSH with Reflex; Future  -     Lipid Panel; Future  -     Hemoglobin A1C; Future    Obesity due to excess calories, unspecified classification, unspecified whether serious comorbidity present  -     semaglutide, 2 MG/DOSE, (OZEMPIC) 8 MG/3ML SOPN sc injection; Inject 2 mg into the skin every 7 days  -     TSH with Reflex; Future  -     Lipid Panel; Future  -     Hemoglobin A1C; Future    Obstructive sleep apnea syndrome  Patient does not want to pursue  to sleep apnea specialist and treatment    -     semaglutide, 2 MG/DOSE, (OZEMPIC) 8 MG/3ML SOPN sc injection; Inject 2 mg into the skin every 7 days    Uncontrolled type 2 diabetes mellitus with hyperglycemia (HCC)  Last hemoglobin A1c was 8.7  -     semaglutide, 2 MG/DOSE, (OZEMPIC) 8 MG/3ML SOPN sc injection; Inject 2 mg into the skin every 7 days  -     Hemoglobin A1C; Future    Acute right flank pain  Of unclear etiology.  Need additional workup.  She is advised if any worsening or new concerning symptoms she should call us back sooner.  -     Urinalysis with Reflex to Culture  -     CT ABDOMEN PELVIS WO CONTRAST Additional Contrast? Radiologist Recommendation; Future    Screen for colon cancer  -     [facility] - Thomas Castillo MD, Gastroenterology ([facility]), [place]    Hypertension:    Belkis Morton returns for follow up of hypertension.  Tolerating medications well and taking them as directed.  No symptoms (denies chest pain,dyspnea,edema or TIA's or blurred vision) concerning for end organ damage are present.      Excellent control with current amlodipine, metoprolol, lisinopril hydrochlorothiazide.        Orders Placed This Encounter   Procedures    CT ABDOMEN PELVIS WO CONTRAST Additional Contrast? Radiologist Recommendation     Standing Status:   Future     Standing Expiration Date:   6/13/2025

## 2024-06-14 LAB
EST. AVERAGE GLUCOSE BLD GHB EST-MCNC: 297.7 MG/DL
HBA1C MFR BLD: 12 %

## 2024-06-29 DIAGNOSIS — I10 ESSENTIAL HYPERTENSION: ICD-10-CM

## 2024-07-01 RX ORDER — ATORVASTATIN CALCIUM 80 MG/1
80 TABLET, FILM COATED ORAL NIGHTLY
Qty: 90 TABLET | Refills: 3 | Status: SHIPPED | OUTPATIENT
Start: 2024-07-01

## 2024-07-01 RX ORDER — CLOPIDOGREL BISULFATE 75 MG/1
75 TABLET ORAL DAILY
Qty: 90 TABLET | Refills: 3 | Status: SHIPPED | OUTPATIENT
Start: 2024-07-01

## 2024-07-01 RX ORDER — LISINOPRIL AND HYDROCHLOROTHIAZIDE 20; 12.5 MG/1; MG/1
1 TABLET ORAL DAILY
Qty: 90 TABLET | Refills: 1 | Status: SHIPPED | OUTPATIENT
Start: 2024-07-01

## 2024-07-01 NOTE — TELEPHONE ENCOUNTER
Requested Prescriptions     Pending Prescriptions Disp Refills    clopidogrel (PLAVIX) 75 MG tablet 90 tablet 3     Sig: Take 1 tablet by mouth daily            Checked Correct Pharmacy: Yes    Any changes since last refill? No     Number: 90    Refills: 3    Last Office Visit: 4/12/2024     Next Office Visit: 7/1/2024       Last Labs: 06.13.2024

## 2024-07-01 NOTE — TELEPHONE ENCOUNTER
Requested Prescriptions     Pending Prescriptions Disp Refills    atorvastatin (LIPITOR) 80 MG tablet 90 tablet 3     Sig: Take 1 tablet by mouth nightly            Checked Correct Pharmacy: Yes    Any changes since last refill? No     Last Office Visit: 4/12/2024     Next Office Visit: 8/5/2024

## 2024-07-02 ENCOUNTER — TELEPHONE (OUTPATIENT)
Dept: CARDIOLOGY CLINIC | Age: 64
End: 2024-07-02

## 2024-07-02 NOTE — TELEPHONE ENCOUNTER
A Cardiac clearance request letter was received from [facility].  The letter was put into [employer] and mailbox.

## 2024-07-06 ENCOUNTER — HOSPITAL ENCOUNTER (OUTPATIENT)
Dept: WOMENS IMAGING | Age: 64
Discharge: HOME | End: 2024-07-06
Payer: MEDICARE

## 2024-07-06 DIAGNOSIS — Z12.31 ENCOUNTER FOR SCREENING MAMMOGRAM FOR BREAST CANCER: ICD-10-CM

## 2024-07-06 DIAGNOSIS — I10 ESSENTIAL HYPERTENSION: ICD-10-CM

## 2024-07-06 PROCEDURE — 77063 BREAST TOMOSYNTHESIS BI: CPT

## 2024-07-08 RX ORDER — AMLODIPINE BESYLATE 10 MG/1
10 TABLET ORAL DAILY
Qty: 90 TABLET | Refills: 3 | OUTPATIENT
Start: 2024-07-08

## 2024-07-23 ENCOUNTER — TELEPHONE (OUTPATIENT)
Dept: CARDIOLOGY CLINIC | Age: 64
End: 2024-07-23

## 2024-07-23 NOTE — TELEPHONE ENCOUNTER
CARDIAC CLEARANCE     What type of procedure are you having? RT total knee replacement    Which physician is performing your procedure? Dr. Obey Denis    When is your procedure scheduled for? Not scheduled yet    Where are you having this procedure? [facility]    Are you taking Blood Thinners? Yes   If so what? clopidogrel (PLAVIX) 75 MG tablet (Name/dose/frequesncy)    Does the surgeon want you to stop your blood thinner?  Yes If so for how long? 7 days    Phone Number and Contact Name for Physicians office: luda [phone]     Fax number to send information: [phone]

## 2024-08-05 ENCOUNTER — OFFICE VISIT (OUTPATIENT)
Dept: CARDIOLOGY CLINIC | Age: 64
End: 2024-08-05
Payer: MEDICARE

## 2024-08-05 VITALS
HEART RATE: 98 BPM | BODY MASS INDEX: 45.1 KG/M2 | SYSTOLIC BLOOD PRESSURE: 130 MMHG | DIASTOLIC BLOOD PRESSURE: 64 MMHG | WEIGHT: 293 LBS

## 2024-08-05 DIAGNOSIS — G47.33 OSA (OBSTRUCTIVE SLEEP APNEA): Primary | ICD-10-CM

## 2024-08-05 DIAGNOSIS — I10 ESSENTIAL HYPERTENSION: ICD-10-CM

## 2024-08-05 PROCEDURE — G8427 DOCREV CUR MEDS BY ELIG CLIN: HCPCS | Performed by: INTERNAL MEDICINE

## 2024-08-05 PROCEDURE — 3078F DIAST BP <80 MM HG: CPT | Performed by: INTERNAL MEDICINE

## 2024-08-05 PROCEDURE — 1090F PRES/ABSN URINE INCON ASSESS: CPT | Performed by: INTERNAL MEDICINE

## 2024-08-05 PROCEDURE — 99214 OFFICE O/P EST MOD 30 MIN: CPT | Performed by: INTERNAL MEDICINE

## 2024-08-05 PROCEDURE — G8400 PT W/DXA NO RESULTS DOC: HCPCS | Performed by: INTERNAL MEDICINE

## 2024-08-05 PROCEDURE — 1123F ACP DISCUSS/DSCN MKR DOCD: CPT | Performed by: INTERNAL MEDICINE

## 2024-08-05 PROCEDURE — 3075F SYST BP GE 130 - 139MM HG: CPT | Performed by: INTERNAL MEDICINE

## 2024-08-05 PROCEDURE — G8417 CALC BMI ABV UP PARAM F/U: HCPCS | Performed by: INTERNAL MEDICINE

## 2024-08-05 PROCEDURE — 1036F TOBACCO NON-USER: CPT | Performed by: INTERNAL MEDICINE

## 2024-08-05 PROCEDURE — 3017F COLORECTAL CA SCREEN DOC REV: CPT | Performed by: INTERNAL MEDICINE

## 2024-08-05 RX ORDER — AMLODIPINE BESYLATE 10 MG/1
10 TABLET ORAL DAILY
Qty: 90 TABLET | Refills: 3 | Status: SHIPPED | OUTPATIENT
Start: 2024-08-05

## 2024-08-05 NOTE — PROGRESS NOTES
[facility]     Outpatient Cardiology         Patient Name:  Belkis Morton  Requesting Physician: No admitting provider for patient encounter.  Primary Care Physician: ANNE-MARIE Bird MD    Reason for Consultation/Chief Complaint:   [facility] care, last seen in 2021    HPI:   63 year old female with the history of HTN here for complaints of chest pressure, nausea and shortness of breath.  She was last seen in 2021.  Had chest pain with exertion and emotional stress relieved with sl NTG and/or rest.  Happened at work.    2/7/24;  had anterior NSTEMI with stent to LAD af [facility].  Ecg today better with less TWI and normal R wave progression today.     4/12/24:  ECG TODAY LOOKS GOOD WITH NO ISCHEMIC CHANGES.  HAS SOME PALPITATIONS IN AM.  NEEDS 50MG TOPROL AND INSTRUCTED TO TAKE 1/2 IN AM AND 1/2 IN PM.    8/5/24; needs SHAYNA evaluation.  No chest pains doing well after LAD stenting.      Histories:     Past Medical History:   has a past medical history of Anxiety, Arthritis, Asthma, Bilateral chronic knee pain, Degenerative disc disease, cervical, Depression, Diabetes, High blood pressure, Hypertriglyceridemia, Lumbar herniated disc, Morbid obesity (HCC), Opiate dependence (HCC), Osteomyelitis ([facility]), and Pneumonia.    Surgical History:   has a past surgical history that includes Cardiac catheterization (11/2017).     Social History:   reports that she has never smoked. She has never used smokeless tobacco. She reports that she does not currently use alcohol. She reports that she does not use drugs.     Family History:  No evidence for sudden cardiac death or premature CAD    Medications:     Home Medications:  Were reviewed and are listed in nursing record. and/or listed below    Prior to Admission medications    Medication Sig Start Date End Date Taking? Authorizing Provider   clopidogrel (PLAVIX) 75 MG tablet Take 1 tablet by mouth daily 7/1/24  Yes Benny Gaines MD   atorvastatin

## 2024-08-06 NOTE — PROGRESS NOTES
Patient reached ____ yes  __X___ no   VM instructions left ____ yes   phone number ________                                ____ no-office notified          Date _8/15/24  ________  Time _1115______  Arrival _0945  [facility]      Nothing to eat or drink after midnight-follow your doctors prep instructions-this may include taking a second dose of your prep after midnight  Responsible adult 18 or older to stay on site while you are here-drive you home-stay with you after  Follow any instructions your doctors office has given you  Bring a complete list of all your medications and supplements including name,dose,how often taken the day of your procedure  If you normally take the following medications in the morning please do so the AM of your procedure with a small sip of water       Heart,blood pressure,seizure,thyroid or breathing medications-use your inhalers-bring any rescue inhalers with you DOS       DO NOT take blood pressure medications ending in \"grisel\" or \"pril\" the AM of procedure or evening prior  Dr Linares patients are not to take any medications the AM of surgery  Take half or your normal dose of any long acting insulins the night before your procedure-do not take any diabetic medications the AM of procedure. If you take a weekly injection for diabetes or weight loss-do not take one week prior to surgery/procedure.If you have already taken your injection this week,contact your surgeon  Follow your doctors instructions regarding stopping or taking  any blood thinners-if you do not have instructions-call them  Any questions call your doctor  Other ____VM TO CHECK PLAVIX AND STOP OZEMPIC NOW UNTIL AFTER PROCEDURE__________________________________________________________      VISITOR POLICY(subject to change)             The current policy is 2 visitors per patient.There are no children allowed.Mask at discretion of facility. Visiting hours are 8a-8p.Overnight visitors will be at the discretion of the nurse.

## 2024-08-08 ENCOUNTER — TELEPHONE (OUTPATIENT)
Dept: INTERNAL MEDICINE CLINIC | Age: 64
End: 2024-08-08

## 2024-08-08 ENCOUNTER — E-VISIT (OUTPATIENT)
Dept: INTERNAL MEDICINE CLINIC | Age: 64
End: 2024-08-08
Payer: MEDICARE

## 2024-08-08 DIAGNOSIS — R05.1 ACUTE COUGH: Primary | ICD-10-CM

## 2024-08-08 DIAGNOSIS — J20.9 ACUTE BRONCHITIS, UNSPECIFIED ORGANISM: Primary | ICD-10-CM

## 2024-08-08 PROCEDURE — 99422 OL DIG E/M SVC 11-20 MIN: CPT | Performed by: INTERNAL MEDICINE

## 2024-08-08 RX ORDER — AZITHROMYCIN 250 MG/1
TABLET, FILM COATED ORAL
Qty: 6 TABLET | Refills: 0 | Status: SHIPPED | OUTPATIENT
Start: 2024-08-08 | End: 2024-08-18

## 2024-08-08 RX ORDER — BENZONATATE 200 MG/1
200 CAPSULE ORAL 3 TIMES DAILY PRN
Qty: 21 CAPSULE | Refills: 0 | Status: SHIPPED | OUTPATIENT
Start: 2024-08-08 | End: 2024-08-15

## 2024-08-08 NOTE — PROGRESS NOTES
Belkis Morton (:  1960) is a 64 y.o. female,Established patient, here for evaluation of the following chief complaint(s):  Chief complaint  Cough with productive sputum for 2 weeks      Assessment & Plan   1. Acute bronchitis, unspecified organism  -     XR CHEST (2 VW); Future  -     azithromycin (ZITHROMAX) 250 MG tablet; 500mg on day 1 followed by 250mg on days 2 - 5, Disp-6 tablet, R-0Normal  -     benzonatate (TESSALON) 200 MG capsule; Take 1 capsule by mouth 3 times daily as needed for Cough, Disp-21 capsule, R-0Normal    Antibiotic azithromycin, Tessalon pulse is sent to the pharmacy.  Please get a chest x-ray order in epic  Use albuterol up to every 6 hours as needed for cough.  If symptoms not better in a week or get worse please make in person visit  No follow-ups on file.       Subjective   HPI  Patient e-visit questionnaire has been reviewed  Review of Systems     Not available  Objective   Physical Exam     Not available  On this date 2024 I have spent 11 minutes reviewing previous notes, test results and face to face with the patient discussing the diagnosis and importance of compliance with the treatment plan as well as documenting on the day of the visit.      An electronic signature was used to authenticate this note.    --ANNE-MARIE Bird MD

## 2024-08-08 NOTE — TELEPHONE ENCOUNTER
Patient is requesting a prescription for cough medication to [address]  She states she has been coughing a lot and has tried OTC that is not working for her.  Patient states she does not have covid due to taking  home test.

## 2024-08-15 ENCOUNTER — ANESTHESIA EVENT (OUTPATIENT)
Dept: ENDOSCOPY | Age: 64
End: 2024-08-15
Payer: MEDICARE

## 2024-08-15 ENCOUNTER — ANESTHESIA (OUTPATIENT)
Dept: ENDOSCOPY | Age: 64
End: 2024-08-15
Payer: MEDICARE

## 2024-08-15 ENCOUNTER — HOSPITAL ENCOUNTER (OUTPATIENT)
Age: 64
Setting detail: OUTPATIENT SURGERY
Discharge: HOME | End: 2024-08-15
Attending: INTERNAL MEDICINE | Admitting: INTERNAL MEDICINE
Payer: MEDICARE

## 2024-08-15 VITALS
TEMPERATURE: 97.6 F | BODY MASS INDEX: 44.41 KG/M2 | RESPIRATION RATE: 11 BRPM | DIASTOLIC BLOOD PRESSURE: 79 MMHG | HEIGHT: 68 IN | HEART RATE: 87 BPM | SYSTOLIC BLOOD PRESSURE: 140 MMHG | OXYGEN SATURATION: 97 % | WEIGHT: 293 LBS

## 2024-08-15 LAB
GLUCOSE BLD-MCNC: 173 MG/DL (ref 70–99)
PERFORMED ON: ABNORMAL

## 2024-08-15 PROCEDURE — 7100000010 HC PHASE II RECOVERY - FIRST 15 MIN: Performed by: INTERNAL MEDICINE

## 2024-08-15 PROCEDURE — 2500000003 HC RX 250 WO HCPCS: Performed by: NURSE ANESTHETIST, CERTIFIED REGISTERED

## 2024-08-15 PROCEDURE — 2580000003 HC RX 258: Performed by: INTERNAL MEDICINE

## 2024-08-15 PROCEDURE — 3700000000 HC ANESTHESIA ATTENDED CARE: Performed by: INTERNAL MEDICINE

## 2024-08-15 PROCEDURE — 3609027000 HC COLONOSCOPY: Performed by: INTERNAL MEDICINE

## 2024-08-15 PROCEDURE — 3700000001 HC ADD 15 MINUTES (ANESTHESIA): Performed by: INTERNAL MEDICINE

## 2024-08-15 PROCEDURE — 2709999900 HC NON-CHARGEABLE SUPPLY: Performed by: INTERNAL MEDICINE

## 2024-08-15 PROCEDURE — 6360000002 HC RX W HCPCS: Performed by: NURSE ANESTHETIST, CERTIFIED REGISTERED

## 2024-08-15 PROCEDURE — 7100000011 HC PHASE II RECOVERY - ADDTL 15 MIN: Performed by: INTERNAL MEDICINE

## 2024-08-15 RX ORDER — LIDOCAINE HYDROCHLORIDE 20 MG/ML
INJECTION, SOLUTION INFILTRATION; PERINEURAL PRN
Status: DISCONTINUED | OUTPATIENT
Start: 2024-08-15 | End: 2024-08-15 | Stop reason: SDUPTHER

## 2024-08-15 RX ORDER — PROPOFOL 10 MG/ML
INJECTION, EMULSION INTRAVENOUS PRN
Status: DISCONTINUED | OUTPATIENT
Start: 2024-08-15 | End: 2024-08-15 | Stop reason: SDUPTHER

## 2024-08-15 RX ORDER — SODIUM CHLORIDE 9 MG/ML
INJECTION, SOLUTION INTRAVENOUS CONTINUOUS
Status: DISCONTINUED | OUTPATIENT
Start: 2024-08-15 | End: 2024-08-15 | Stop reason: HOSPADM

## 2024-08-15 RX ADMIN — PROPOFOL 20 MG: 10 INJECTION, EMULSION INTRAVENOUS at 13:19

## 2024-08-15 RX ADMIN — PROPOFOL 50 MG: 10 INJECTION, EMULSION INTRAVENOUS at 13:13

## 2024-08-15 RX ADMIN — PROPOFOL 100 MCG/KG/MIN: 10 INJECTION, EMULSION INTRAVENOUS at 13:15

## 2024-08-15 RX ADMIN — SODIUM CHLORIDE: 9 INJECTION, SOLUTION INTRAVENOUS at 11:13

## 2024-08-15 RX ADMIN — PROPOFOL 100 MG: 10 INJECTION, EMULSION INTRAVENOUS at 13:11

## 2024-08-15 RX ADMIN — LIDOCAINE HYDROCHLORIDE 100 MG: 20 INJECTION, SOLUTION INFILTRATION; PERINEURAL at 13:11

## 2024-08-15 ASSESSMENT — LIFESTYLE VARIABLES: SMOKING_STATUS: 0

## 2024-08-15 ASSESSMENT — PAIN - FUNCTIONAL ASSESSMENT
PAIN_FUNCTIONAL_ASSESSMENT: 0-10
PAIN_FUNCTIONAL_ASSESSMENT: 0-10

## 2024-08-15 NOTE — ANESTHESIA POSTPROCEDURE EVALUATION
Department of Anesthesiology  Postprocedure Note    Patient: Belkis Morton  MRN: [id number]  YOB: 1960  Date of evaluation: 8/15/2024    Procedure Summary       Date: 08/15/24 Room / Location: [place] [id number] / [facility]    Anesthesia Start: 1308 Anesthesia Stop: 1337    Procedure: COLONOSCOPY DIAGNOSTIC Diagnosis:       Colon cancer screening      (Colon cancer screening [Z12.11])    Surgeons: Jesus Lou MD Responsible Provider: Seth Moreno MD    Anesthesia Type: MAC ASA Status: 3            Anesthesia Type: MAC    Shira Phase I: Shira Score: 10    Shira Phase II:      Anesthesia Post Evaluation    Patient location during evaluation: PACU  Patient participation: complete - patient participated  Level of consciousness: awake and alert  Airway patency: patent  Nausea & Vomiting: no vomiting and no nausea  Cardiovascular status: hemodynamically stable  Respiratory status: acceptable  Hydration status: stable  Pain management: adequate    No notable events documented.

## 2024-08-15 NOTE — ANESTHESIA PRE PROCEDURE
Department of Anesthesiology  Preprocedure Note       Name:  Belkis Morton   Age:  64 y.o.  :  1960                                          MRN:  [id number]         Date:  8/15/2024      Surgeon: Surgeon(s):  Jesus Lou MD    Procedure: Procedure(s):  COLONOSCOPY DIAGNOSTIC    Medications prior to admission:   Prior to Admission medications    Medication Sig Start Date End Date Taking? Authorizing Provider   azithromycin (ZITHROMAX) 250 MG tablet 500mg on day 1 followed by 250mg on days 2 - 5 24  ANNE-MARIE Bird MD   benzonatate (TESSALON) 200 MG capsule Take 1 capsule by mouth 3 times daily as needed for Cough 8/8/24 8/15/24  ANNE-MARIE Bird MD   amLODIPine (NORVASC) 10 MG tablet Take 1 tablet by mouth daily 24   Benny Gaines MD   clopidogrel (PLAVIX) 75 MG tablet Take 1 tablet by mouth daily 24   Benny Gaines MD   atorvastatin (LIPITOR) 80 MG tablet Take 1 tablet by mouth nightly 24   Benny Gaines MD   pregabalin (LYRICA) 100 MG capsule Take 1 capsule by mouth 2 times daily.    Provider, MD Oscar   semaglutide, 2 MG/DOSE, (OZEMPIC) 8 MG/3ML SOPN sc injection Inject 2 mg into the skin every 7 days 24   ANNE-MARIE Bird MD   metoprolol succinate (TOPROL XL) 50 MG extended release tablet Take 1 tablet by mouth daily 24   Kaylee Ferguson, MORGAN - CNP   insulin glargine (LANTUS SOLOSTAR) 100 UNIT/ML injection pen Inject 52 Units into the skin nightly 3/6/24   ANNE-MARIE Bird MD   blood glucose monitor strips Test 3 times a day & as needed for symptoms of irregular blood glucose. Dispense sufficient amount for indicated testing frequency plus additional to accommodate PRN testing needs. 3/6/24   ANNE-MARIE Bird MD   ibuprofen (ADVIL;MOTRIN) 600 MG tablet Take 1 tablet by mouth 3 times daily as needed for Pain 24   Atilio Sy PA   furosemide (LASIX) 20 MG tablet Take 1 tablet by mouth daily 24   Benny Gaines MD

## 2024-08-15 NOTE — BRIEF OP NOTE
Brief Postoperative Note - Full Note in Chart Review/Procedures tab       Patient: Belkis Morton  YOB: 1960  MRN: [id number]    Date of Procedure: 8/15/2024    Pre-Op Diagnosis Codes:      * Colon cancer screening [Z12.11]    Post-Op Diagnosis: Same       Procedure(s):  COLONOSCOPY DIAGNOSTIC    Surgeon(s):  Jesus Lou MD    Assistant:  * No surgical staff found *    Anesthesia: Monitor Anesthesia Care    Estimated Blood Loss (mL): Minimal    Complications: None    Specimens:   * No specimens in log *    Implants:  * No implants in log *      Drains: * No LDAs found *    Findings:  Infection Present At Time Of Surgery (PATOS) (choose all levels that have infection present):  No infection present  Other Findings:   Pancolonic diverticulosis - K57.30  Normal terminal ileum    Rec:  Continue present diet  Continue present treatment.  F/U WITH ME AS NEEDED  Followup colonoscopy in 10 years  Followup with referring physician as previously instucted  Restart Plavix today    Electronically signed by Jesus Lou MD on 8/15/2024 at 1:41 PM

## 2024-08-15 NOTE — H&P
Gastroenterology Note             Pre-operative History and Physical    Patient: Belkis Morton  : 1960  CSN:     History Obtained From:  patient and/or guardian.     HISTORY OF PRESENT ILLNESS:    The patient is a 64 y.o. female here for average risk screening colonoscopy.      Past Medical History:    Past Medical History:   Diagnosis Date    Anxiety     Arthritis     Asthma     as child    Bilateral chronic knee pain     sees pain management    CAD (coronary artery disease)     Degenerative disc disease, cervical MRI     Multilevel cervical degenerative this disease most significant at the C7-T1 level where there is probable impingement of the exiting right C8 nerve root caused by disc herniation.    Depression     Diabetes     Amira Espinosa, NP at     High blood pressure     Hypertriglyceridemia     Lumbar herniated disc MRI     L5-S1    Morbid obesity ([facility])     Opiate dependence ([facility])     Osteomyelitis ([facility])     Pneumonia     as child    Presbyopia     STEMI (ST elevation myocardial infarction) ([facility]) 2024     Past Surgical History:    Past Surgical History:   Procedure Laterality Date    CARDIAC CATHETERIZATION  2017    1.  Diffusely diseased distal LAD.  Distal LAD has a 50% narrowing in two ---    CORONARY STENT PLACEMENT  2024    EYE SURGERY Bilateral     cataract     Medications Prior to Admission:   No current facility-administered medications on file prior to encounter.     Current Outpatient Medications on File Prior to Encounter   Medication Sig Dispense Refill    ibuprofen (ADVIL;MOTRIN) 600 MG tablet Take 1 tablet by mouth 3 times daily as needed for Pain 30 tablet 0    furosemide (LASIX) 20 MG tablet Take 1 tablet by mouth daily 90 tablet 3    insulin lispro (HUMALOG KWIKPEN) 200 UNIT/ML SOPN pen Inject 10 Units into the skin 3 times daily as needed for High Blood Sugar (sliding scale: add 2 units insulin for every 50 mg/dL > 150 mg/dL; 151-199 add

## 2024-08-28 DIAGNOSIS — E11.10 DIABETIC KETOACIDOSIS WITHOUT COMA ASSOCIATED WITH TYPE 2 DIABETES MELLITUS (HCC): ICD-10-CM

## 2024-08-28 DIAGNOSIS — E11.40 TYPE 2 DIABETES MELLITUS WITH DIABETIC NEUROPATHY, WITH LONG-TERM CURRENT USE OF INSULIN (HCC): ICD-10-CM

## 2024-08-28 DIAGNOSIS — Z79.4 TYPE 2 DIABETES MELLITUS WITH DIABETIC NEUROPATHY, WITH LONG-TERM CURRENT USE OF INSULIN (HCC): ICD-10-CM

## 2024-08-28 RX ORDER — GLUCOSAMINE HCL/CHONDROITIN SU 500-400 MG
CAPSULE ORAL
Qty: 300 STRIP | Refills: 1 | Status: SHIPPED | OUTPATIENT
Start: 2024-08-28

## 2024-08-28 RX ORDER — INSULIN GLARGINE 100 [IU]/ML
52 INJECTION, SOLUTION SUBCUTANEOUS NIGHTLY
Qty: 5 ADJUSTABLE DOSE PRE-FILLED PEN SYRINGE | Refills: 1 | Status: SHIPPED | OUTPATIENT
Start: 2024-08-28

## 2024-09-06 ENCOUNTER — TELEPHONE (OUTPATIENT)
Dept: INTERNAL MEDICINE CLINIC | Age: 64
End: 2024-09-06

## 2024-09-06 NOTE — TELEPHONE ENCOUNTER
Patient called in stated that she woke up at 830 with right sided chest pain that localized only on right side. Took at Nitroglycerin pill about 1055 AM and pain still feels the same. Patient advised to go to ER , and patient stated that she will go to [facility].

## 2024-09-12 ENCOUNTER — TELEPHONE (OUTPATIENT)
Dept: INTERNAL MEDICINE CLINIC | Age: 64
End: 2024-09-12

## 2024-09-19 ENCOUNTER — OFFICE VISIT (OUTPATIENT)
Dept: INTERNAL MEDICINE CLINIC | Age: 64
End: 2024-09-19

## 2024-09-19 VITALS
SYSTOLIC BLOOD PRESSURE: 126 MMHG | DIASTOLIC BLOOD PRESSURE: 70 MMHG | HEART RATE: 84 BPM | OXYGEN SATURATION: 98 % | TEMPERATURE: 97.3 F | HEIGHT: 68 IN | BODY MASS INDEX: 44.41 KG/M2 | WEIGHT: 293 LBS

## 2024-09-19 DIAGNOSIS — Z79.4 TYPE 2 DIABETES MELLITUS WITH DIABETIC NEUROPATHY, WITH LONG-TERM CURRENT USE OF INSULIN (HCC): ICD-10-CM

## 2024-09-19 DIAGNOSIS — I10 ESSENTIAL HYPERTENSION: ICD-10-CM

## 2024-09-19 DIAGNOSIS — Z00.00 INITIAL MEDICARE ANNUAL WELLNESS VISIT: Primary | ICD-10-CM

## 2024-09-19 DIAGNOSIS — I25.10 CORONARY ARTERY DISEASE INVOLVING NATIVE CORONARY ARTERY OF NATIVE HEART, UNSPECIFIED WHETHER ANGINA PRESENT: ICD-10-CM

## 2024-09-19 DIAGNOSIS — E11.40 TYPE 2 DIABETES MELLITUS WITH DIABETIC NEUROPATHY, WITH LONG-TERM CURRENT USE OF INSULIN (HCC): ICD-10-CM

## 2024-09-19 DIAGNOSIS — Z95.5 H/O HEART ARTERY STENT: ICD-10-CM

## 2024-09-19 LAB — HBA1C MFR BLD: 8 %

## 2024-09-19 RX ORDER — GLUCOSAMINE HCL/CHONDROITIN SU 500-400 MG
CAPSULE ORAL
Qty: 300 STRIP | Refills: 1 | Status: SHIPPED | OUTPATIENT
Start: 2024-09-19

## 2024-09-19 RX ORDER — METOPROLOL SUCCINATE 50 MG/1
50 TABLET, EXTENDED RELEASE ORAL DAILY
Qty: 90 TABLET | Refills: 2 | Status: SHIPPED | OUTPATIENT
Start: 2024-09-19

## 2024-09-19 ASSESSMENT — PATIENT HEALTH QUESTIONNAIRE - PHQ9
2. FEELING DOWN, DEPRESSED OR HOPELESS: NOT AT ALL
7. TROUBLE CONCENTRATING ON THINGS, SUCH AS READING THE NEWSPAPER OR WATCHING TELEVISION: NOT AT ALL
SUM OF ALL RESPONSES TO PHQ9 QUESTIONS 1 & 2: 0
8. MOVING OR SPEAKING SO SLOWLY THAT OTHER PEOPLE COULD HAVE NOTICED. OR THE OPPOSITE, BEING SO FIGETY OR RESTLESS THAT YOU HAVE BEEN MOVING AROUND A LOT MORE THAN USUAL: NOT AT ALL
6. FEELING BAD ABOUT YOURSELF - OR THAT YOU ARE A FAILURE OR HAVE LET YOURSELF OR YOUR FAMILY DOWN: NOT AT ALL
3. TROUBLE FALLING OR STAYING ASLEEP: NOT AT ALL
1. LITTLE INTEREST OR PLEASURE IN DOING THINGS: NOT AT ALL
SUM OF ALL RESPONSES TO PHQ QUESTIONS 1-9: 3
SUM OF ALL RESPONSES TO PHQ QUESTIONS 1-9: 3
10. IF YOU CHECKED OFF ANY PROBLEMS, HOW DIFFICULT HAVE THESE PROBLEMS MADE IT FOR YOU TO DO YOUR WORK, TAKE CARE OF THINGS AT HOME, OR GET ALONG WITH OTHER PEOPLE: NOT DIFFICULT AT ALL
5. POOR APPETITE OR OVEREATING: NOT AT ALL
SUM OF ALL RESPONSES TO PHQ QUESTIONS 1-9: 3
4. FEELING TIRED OR HAVING LITTLE ENERGY: NEARLY EVERY DAY
SUM OF ALL RESPONSES TO PHQ QUESTIONS 1-9: 3
9. THOUGHTS THAT YOU WOULD BE BETTER OFF DEAD, OR OF HURTING YOURSELF: NOT AT ALL

## 2024-09-19 ASSESSMENT — LIFESTYLE VARIABLES
HOW MANY STANDARD DRINKS CONTAINING ALCOHOL DO YOU HAVE ON A TYPICAL DAY: 1 OR 2
HOW OFTEN DO YOU HAVE A DRINK CONTAINING ALCOHOL: MONTHLY OR LESS

## 2024-09-26 ENCOUNTER — OFFICE VISIT (OUTPATIENT)
Dept: CARDIOLOGY CLINIC | Age: 64
End: 2024-09-26
Payer: MEDICARE

## 2024-09-26 VITALS
DIASTOLIC BLOOD PRESSURE: 84 MMHG | HEIGHT: 68 IN | WEIGHT: 293 LBS | BODY MASS INDEX: 44.41 KG/M2 | SYSTOLIC BLOOD PRESSURE: 134 MMHG | HEART RATE: 85 BPM

## 2024-09-26 DIAGNOSIS — I10 ESSENTIAL HYPERTENSION: ICD-10-CM

## 2024-09-26 DIAGNOSIS — E78.01 FAMILIAL HYPERCHOLESTEROLEMIA: ICD-10-CM

## 2024-09-26 DIAGNOSIS — I25.10 ATHEROSCLEROSIS OF NATIVE CORONARY ARTERY OF NATIVE HEART WITHOUT ANGINA PECTORIS: Primary | ICD-10-CM

## 2024-09-26 PROCEDURE — 1090F PRES/ABSN URINE INCON ASSESS: CPT | Performed by: INTERNAL MEDICINE

## 2024-09-26 PROCEDURE — G8400 PT W/DXA NO RESULTS DOC: HCPCS | Performed by: INTERNAL MEDICINE

## 2024-09-26 PROCEDURE — 99214 OFFICE O/P EST MOD 30 MIN: CPT | Performed by: INTERNAL MEDICINE

## 2024-09-26 PROCEDURE — G8417 CALC BMI ABV UP PARAM F/U: HCPCS | Performed by: INTERNAL MEDICINE

## 2024-09-26 PROCEDURE — 3079F DIAST BP 80-89 MM HG: CPT | Performed by: INTERNAL MEDICINE

## 2024-09-26 PROCEDURE — 1036F TOBACCO NON-USER: CPT | Performed by: INTERNAL MEDICINE

## 2024-09-26 PROCEDURE — 3075F SYST BP GE 130 - 139MM HG: CPT | Performed by: INTERNAL MEDICINE

## 2024-09-26 PROCEDURE — G8427 DOCREV CUR MEDS BY ELIG CLIN: HCPCS | Performed by: INTERNAL MEDICINE

## 2024-09-26 PROCEDURE — 93000 ELECTROCARDIOGRAM COMPLETE: CPT | Performed by: INTERNAL MEDICINE

## 2024-09-26 PROCEDURE — 3017F COLORECTAL CA SCREEN DOC REV: CPT | Performed by: INTERNAL MEDICINE

## 2024-09-26 PROCEDURE — 1123F ACP DISCUSS/DSCN MKR DOCD: CPT | Performed by: INTERNAL MEDICINE

## 2024-09-26 RX ORDER — ISOSORBIDE MONONITRATE 30 MG/1
30 TABLET, EXTENDED RELEASE ORAL DAILY
Qty: 30 TABLET | Refills: 5 | Status: SHIPPED | OUTPATIENT
Start: 2024-09-26

## 2024-09-26 RX ORDER — METOPROLOL SUCCINATE 25 MG/1
25 TABLET, EXTENDED RELEASE ORAL 2 TIMES DAILY
Qty: 60 TABLET | Refills: 5
Start: 2024-09-26

## 2024-09-29 DIAGNOSIS — E11.40 TYPE 2 DIABETES MELLITUS WITH DIABETIC NEUROPATHY, WITH LONG-TERM CURRENT USE OF INSULIN (HCC): ICD-10-CM

## 2024-09-29 DIAGNOSIS — Z79.4 TYPE 2 DIABETES MELLITUS WITH DIABETIC NEUROPATHY, WITH LONG-TERM CURRENT USE OF INSULIN (HCC): ICD-10-CM

## 2024-09-30 RX ORDER — INSULIN GLARGINE 100 [IU]/ML
52 INJECTION, SOLUTION SUBCUTANEOUS NIGHTLY
Qty: 5 ADJUSTABLE DOSE PRE-FILLED PEN SYRINGE | Refills: 1 | Status: SHIPPED | OUTPATIENT
Start: 2024-09-30

## 2024-09-30 RX ORDER — GLUCOSAMINE HCL/CHONDROITIN SU 500-400 MG
CAPSULE ORAL
Qty: 300 STRIP | Refills: 1 | OUTPATIENT
Start: 2024-09-30

## 2024-10-07 ENCOUNTER — TELEPHONE (OUTPATIENT)
Dept: CARDIOLOGY CLINIC | Age: 64
End: 2024-10-07

## 2024-10-07 NOTE — TELEPHONE ENCOUNTER
Had stent 2/2024  For elective surgery, ie TKR, best to wait 12 months before hold plavix  Rec delay surgery until 2/2025 at which time OK hold plavix x 7 days, but remain on ASA

## 2024-10-07 NOTE — TELEPHONE ENCOUNTER
Attempted to reach pt, left vm to call office back. Please relay [facility] message     Cardiac Clrx letter created. Will scan when confirmation is received.

## 2024-10-07 NOTE — TELEPHONE ENCOUNTER
CARDIAC CLEARANCE REQUEST    What type of procedure are you having:  Right Total Knee Replacement   Are you taking any blood thinners:  Plavix- 7 days  Type on anesthesia:  TBD  When is your procedure scheduled for:  not scheduled yet   What physician is performing your procedure:  Dr. Yesica Denis  Phone Number:  [phone]  Fax number to send the letter:   [phone]

## 2024-10-15 ENCOUNTER — TELEPHONE (OUTPATIENT)
Dept: INTERNAL MEDICINE CLINIC | Age: 64
End: 2024-10-15

## 2024-10-15 NOTE — TELEPHONE ENCOUNTER
Pt advised about the message regarding the amlodipine. Pt states she currently does not have a Cardiologist. Dr. Haddad is not taking new patients and she doesn't know who she's going to be seeing since Dr. Gaines left the practice.

## 2024-10-16 NOTE — TELEPHONE ENCOUNTER
Called patient & advised of the referral. Provided the name & number. Patient states that she will call to make an appt.

## 2024-10-16 NOTE — TELEPHONE ENCOUNTER
I already referred her to another cardiologist and referral is in epic she need to call and make appointment and discuss use of calcium channel blocker.

## 2024-10-24 ENCOUNTER — TELEPHONE (OUTPATIENT)
Dept: INTERNAL MEDICINE CLINIC | Age: 64
End: 2024-10-24

## 2024-10-24 NOTE — TELEPHONE ENCOUNTER
[facility] [employer] calling in about a fax that was sent to the office 10 minutes ago about dexcom sensor. She was advised faxes go to an email and have not been printed off yet. She was asked to check back tomorrow to see if that has been reviewed by .

## 2024-10-24 NOTE — TELEPHONE ENCOUNTER
Info sent to this writer via [employer] and documents were completed and printed for  to sign and to fax back once signed.

## 2024-11-11 ENCOUNTER — OFFICE VISIT (OUTPATIENT)
Dept: INTERNAL MEDICINE CLINIC | Age: 64
End: 2024-11-11

## 2024-11-11 ENCOUNTER — HOSPITAL ENCOUNTER (OUTPATIENT)
Age: 64
Discharge: HOME | End: 2024-11-11
Payer: MEDICARE

## 2024-11-11 ENCOUNTER — HOSPITAL ENCOUNTER (OUTPATIENT)
Dept: GENERAL RADIOLOGY | Age: 64
Discharge: HOME | End: 2024-11-11
Payer: MEDICARE

## 2024-11-11 ENCOUNTER — TELEPHONE (OUTPATIENT)
Dept: INTERNAL MEDICINE CLINIC | Age: 64
End: 2024-11-11

## 2024-11-11 VITALS
DIASTOLIC BLOOD PRESSURE: 78 MMHG | HEIGHT: 68 IN | OXYGEN SATURATION: 100 % | SYSTOLIC BLOOD PRESSURE: 136 MMHG | WEIGHT: 286.2 LBS | HEART RATE: 98 BPM | BODY MASS INDEX: 43.38 KG/M2

## 2024-11-11 DIAGNOSIS — T14.8XXA FOREIGN BODY IN SKIN: ICD-10-CM

## 2024-11-11 DIAGNOSIS — T14.8XXA FOREIGN BODY IN SKIN: Primary | ICD-10-CM

## 2024-11-11 PROCEDURE — 74019 RADEX ABDOMEN 2 VIEWS: CPT

## 2024-11-11 ASSESSMENT — ENCOUNTER SYMPTOMS
ABDOMINAL PAIN: 0
PHOTOPHOBIA: 0
SHORTNESS OF BREATH: 0
VOMITING: 0
WHEEZING: 0
NAUSEA: 0

## 2024-11-11 NOTE — TELEPHONE ENCOUNTER
Patient states continuous glucose sensor fell off last night and the needle is embedded in her skin on her stomach. She tried to get the needle out but has been unable to do so. There are no available appointments today.

## 2024-11-11 NOTE — PROGRESS NOTES
Subjective   Patient ID: Belkis Morton is a 64 y.o. female.    HPI  Patient is complaining of she might have a small broken piece of continuous glucose monitoring sensor needle at her abdominal wall.  Patient denies abdominal pain.  May be slight tiny pinhead spot of redness  And blood sugar now going around 130s  She does not need refill of any medicine  Review of Systems   Constitutional:  Negative for fatigue and unexpected weight change.   Eyes:  Negative for photophobia and visual disturbance.   Respiratory:  Negative for shortness of breath and wheezing.    Cardiovascular:  Negative for chest pain and palpitations.   Gastrointestinal:  Negative for abdominal pain, nausea and vomiting.   Neurological:  Negative for dizziness and light-headedness.          Objective   Physical Exam  Vitals reviewed.   Constitutional:       Appearance: She is obese.   Cardiovascular:      Rate and Rhythm: Normal rate and regular rhythm.      Pulses: Normal pulses.      Heart sounds: Normal heart sounds.   Pulmonary:      Effort: Pulmonary effort is normal.      Breath sounds: Normal breath sounds.   Abdominal:      General: Bowel sounds are normal.   Skin:            Comments: Tiny pin point area of redness.  No palpable foreign body.   Neurological:      Mental Status: She is alert.          Wt Readings from Last 3 Encounters:   11/11/24 129.8 kg (286 lb 3.2 oz)   09/26/24 136 kg (299 lb 12.8 oz)   09/19/24 133 kg (293 lb 3.2 oz)     Assessment/Plan:  Belkis \"Belkis Zapata\" was seen today for puncture wound.    Diagnoses and all orders for this visit:    Foreign body in skin  Apparently no foreign body was palpable.  Using a needle head tiny pinpoint area is negotiated under aseptic condition but no foreign body or metallic foreign body is appreciated.  Will get an plain x-ray to see any radiopaque tip of CGM left over.  Patient is up-to-date on Tdap vaccination  -     XR ABDOMEN (2 VIEWS); Future    An After Visit Summary was

## 2025-01-21 DIAGNOSIS — Z79.4 TYPE 2 DIABETES MELLITUS WITH DIABETIC NEUROPATHY, WITH LONG-TERM CURRENT USE OF INSULIN (HCC): ICD-10-CM

## 2025-01-21 DIAGNOSIS — E11.40 TYPE 2 DIABETES MELLITUS WITH DIABETIC NEUROPATHY, WITH LONG-TERM CURRENT USE OF INSULIN (HCC): ICD-10-CM

## 2025-01-21 RX ORDER — INSULIN GLARGINE 100 [IU]/ML
52 INJECTION, SOLUTION SUBCUTANEOUS NIGHTLY
Qty: 47 ML | Refills: 0 | Status: SHIPPED | OUTPATIENT
Start: 2025-01-21

## 2025-01-23 ENCOUNTER — OFFICE VISIT (OUTPATIENT)
Dept: ENDOCRINOLOGY | Age: 65
End: 2025-01-23

## 2025-01-23 VITALS
HEART RATE: 82 BPM | DIASTOLIC BLOOD PRESSURE: 87 MMHG | WEIGHT: 286 LBS | SYSTOLIC BLOOD PRESSURE: 147 MMHG | BODY MASS INDEX: 43.35 KG/M2 | HEIGHT: 68 IN

## 2025-01-23 DIAGNOSIS — Z79.4 TYPE 2 DIABETES MELLITUS WITH DIABETIC NEUROPATHY, WITH LONG-TERM CURRENT USE OF INSULIN (HCC): Primary | ICD-10-CM

## 2025-01-23 DIAGNOSIS — E11.40 TYPE 2 DIABETES MELLITUS WITH DIABETIC NEUROPATHY, WITH LONG-TERM CURRENT USE OF INSULIN (HCC): Primary | ICD-10-CM

## 2025-01-23 DIAGNOSIS — E66.01 MORBID OBESITY WITH BMI OF 45.0-49.9, ADULT: ICD-10-CM

## 2025-01-23 LAB — HBA1C MFR BLD: 6.2 %

## 2025-01-23 NOTE — PROGRESS NOTES
[facility] Endocrinology    Chief Complaint:     Chief Complaint   Patient presents with    Diabetes          Subjective:   Belkis Morton is a pleasant 64 y.o. female who presents for an initial evaluation of Diabetes Mellitus. Patient also has hypertension, hyperlipidemia, asthma, osteoarthritis, CAD post stenting in Jan 2024, depression has a BMI of 43.  This is complicated with neuropathy.    History of Present Illness  The patient presents for evaluation of type 2 diabetes mellitus.    She was diagnosed with diabetes in 2004 and has been on insulin therapy for over a decade. She has been on Ozempic and Jardiance, but due to insurance issues, she is not currently taking Jardiance. She started with Trulicity and then switched to Ozempic, but both medications have been inconsistent due to cost. She has been without Ozempic for a week and Jardiance for two weeks. She reports that Ozempic helps control her blood sugar levels when she maintains a healthy diet. Her blood sugar level was 270 last night and 170 this morning, which is higher than when she is able to take all her medications. She has been trying intermittent fasting but has not been consistent with it. She occasionally experiences low blood sugar levels, as low as 30 or 40, which disrupts her 24-hour fasting schedule. This has not occurred in the past few weeks. She has not increased her Lantus dosage. She did not experience any urinary or yeast infections while on Jardiance. She has not noticed any changes in her hand or shoe size. She administers her insulin injections in the stomach area and has not noticed any lumps, bumps, or bleeding at the injection sites. She does not like using the Dexcom device because she finds changing the sensor uncomfortable. She is considering bariatric surgery and is seeking a referral to a bariatric surgeon. She takes Lantus 52 units and Humalog 14 to 16 units with meals, although not consistently.    Most recent HbA1c:

## 2025-01-23 NOTE — PROGRESS NOTES
Samples of this drug were given to the patient, quantity 4 boxes, Lot Number [id number]  -----------------------------  Dexcom Clarity  -----------------------------  ZoilaJodi Morton    YOB: 1960    Generated at: Thu, Jan 23, 2025 12:47 PM EST    Reporting period: Fri Bj 10, 2025 - Thu Jan 23, 2025  -----------------------------  Glucose Details    Average glucose: 145 mg/dL    GMI: 6.8%    Standard deviation: 37 mg/dL    Coefficient of Variation: 25.3%  -----------------------------  Time in Range    Very High: 1%    High: 15%    In Range: 84%    Low: 0%    Very Low: <1%    Target Range   mg/dL    -----------------------------  Sensor usage    Days with data: 12/14    Time active: 85%    Avg. calibrations per day: 0.0

## 2025-01-30 ENCOUNTER — APPOINTMENT (OUTPATIENT)
Dept: GENERAL RADIOLOGY | Age: 65
DRG: 305 | End: 2025-01-30
Payer: MEDICARE

## 2025-01-30 ENCOUNTER — OFFICE VISIT (OUTPATIENT)
Dept: INTERNAL MEDICINE CLINIC | Age: 65
End: 2025-01-30
Payer: MEDICARE

## 2025-01-30 ENCOUNTER — HOSPITAL ENCOUNTER (INPATIENT)
Age: 65
LOS: 1 days | Discharge: HOME | DRG: 305 | End: 2025-02-01
Attending: INTERNAL MEDICINE | Admitting: INTERNAL MEDICINE
Payer: MEDICARE

## 2025-01-30 ENCOUNTER — APPOINTMENT (OUTPATIENT)
Age: 65
DRG: 305 | End: 2025-01-30
Attending: INTERNAL MEDICINE
Payer: MEDICARE

## 2025-01-30 VITALS
WEIGHT: 293 LBS | BODY MASS INDEX: 44.41 KG/M2 | HEART RATE: 87 BPM | SYSTOLIC BLOOD PRESSURE: 160 MMHG | OXYGEN SATURATION: 98 % | HEIGHT: 68 IN | DIASTOLIC BLOOD PRESSURE: 100 MMHG

## 2025-01-30 DIAGNOSIS — I10 ESSENTIAL HYPERTENSION: ICD-10-CM

## 2025-01-30 DIAGNOSIS — R07.9 CHEST PAIN, UNSPECIFIED TYPE: ICD-10-CM

## 2025-01-30 DIAGNOSIS — R07.89 OTHER CHEST PAIN: ICD-10-CM

## 2025-01-30 DIAGNOSIS — M79.601 RIGHT ARM PAIN: Primary | ICD-10-CM

## 2025-01-30 DIAGNOSIS — I21.4 NSTEMI (NON-ST ELEVATED MYOCARDIAL INFARCTION) (HCC): ICD-10-CM

## 2025-01-30 DIAGNOSIS — Z95.5 HX OF HEART ARTERY STENT: ICD-10-CM

## 2025-01-30 LAB
ALBUMIN SERPL-MCNC: 3.8 G/DL (ref 3.4–5)
ALBUMIN/GLOB SERPL: 1.1 {RATIO} (ref 1.1–2.2)
ALP SERPL-CCNC: 123 U/L (ref 40–129)
ALT SERPL-CCNC: 11 U/L (ref 10–40)
ANION GAP SERPL CALCULATED.3IONS-SCNC: 11 MMOL/L (ref 3–16)
AST SERPL-CCNC: 19 U/L (ref 15–37)
BASOPHILS # BLD: 0 K/UL (ref 0–0.2)
BASOPHILS NFR BLD: 1 %
BILIRUB SERPL-MCNC: 0.5 MG/DL (ref 0–1)
BUN SERPL-MCNC: 18 MG/DL (ref 7–20)
CALCIUM SERPL-MCNC: 9 MG/DL (ref 8.3–10.6)
CHLORIDE SERPL-SCNC: 104 MMOL/L (ref 99–110)
CO2 SERPL-SCNC: 25 MMOL/L (ref 21–32)
CREAT SERPL-MCNC: 0.9 MG/DL (ref 0.6–1.2)
DEPRECATED RDW RBC AUTO: 16.2 % (ref 12.4–15.4)
ECHO AO ASC DIAM: 3.2 CM
ECHO AO ASCENDING AORTA INDEX: 1.36 CM/M2
ECHO AO ROOT DIAM: 2.7 CM
ECHO AO ROOT INDEX: 1.14 CM/M2
ECHO AV AREA PEAK VELOCITY: 2.4 CM2
ECHO AV AREA VTI: 2.5 CM2
ECHO AV AREA/BSA PEAK VELOCITY: 1 CM2/M2
ECHO AV AREA/BSA VTI: 1.1 CM2/M2
ECHO AV MEAN GRADIENT: 3 MMHG
ECHO AV MEAN VELOCITY: 0.8 M/S
ECHO AV PEAK GRADIENT: 5 MMHG
ECHO AV PEAK VELOCITY: 1.1 M/S
ECHO AV VELOCITY RATIO: 0.82
ECHO AV VTI: 26.7 CM
ECHO BSA: 2.5 M2
ECHO IVC INSP: 0.9 CM
ECHO IVC PROX: 1.6 CM
ECHO LA AREA 2C: 24.3 CM2
ECHO LA AREA 4C: 28 CM2
ECHO LA MAJOR AXIS: 5.9 CM
ECHO LA MINOR AXIS: 5.7 CM
ECHO LA VOL BP: 98 ML (ref 22–52)
ECHO LA VOL MOD A2C: 86 ML (ref 22–52)
ECHO LA VOL MOD A4C: 108 ML (ref 22–52)
ECHO LA VOL/BSA BIPLANE: 42 ML/M2 (ref 16–34)
ECHO LA VOLUME INDEX MOD A2C: 36 ML/M2 (ref 16–34)
ECHO LA VOLUME INDEX MOD A4C: 46 ML/M2 (ref 16–34)
ECHO LV E' LATERAL VELOCITY: 4.12 CM/S
ECHO LV E' SEPTAL VELOCITY: 4.6 CM/S
ECHO LV EDV A2C: 150 ML
ECHO LV EDV A4C: 155 ML
ECHO LV EDV INDEX A4C: 66 ML/M2
ECHO LV EDV NDEX A2C: 64 ML/M2
ECHO LV EJECTION FRACTION A2C: 56 %
ECHO LV EJECTION FRACTION A4C: 55 %
ECHO LV EJECTION FRACTION BIPLANE: 55 % (ref 55–100)
ECHO LV ESV A2C: 66 ML
ECHO LV ESV A4C: 71 ML
ECHO LV ESV INDEX A2C: 28 ML/M2
ECHO LV ESV INDEX A4C: 30 ML/M2
ECHO LV FRACTIONAL SHORTENING: 23 % (ref 28–44)
ECHO LV INTERNAL DIMENSION DIASTOLE INDEX: 1.99 CM/M2
ECHO LV INTERNAL DIMENSION DIASTOLIC: 4.7 CM (ref 3.9–5.3)
ECHO LV INTERNAL DIMENSION SYSTOLIC INDEX: 1.53 CM/M2
ECHO LV INTERNAL DIMENSION SYSTOLIC: 3.6 CM
ECHO LV IVSD: 1.4 CM (ref 0.6–0.9)
ECHO LV MASS 2D: 237.9 G (ref 67–162)
ECHO LV MASS INDEX 2D: 100.8 G/M2 (ref 43–95)
ECHO LV POSTERIOR WALL DIASTOLIC: 1.2 CM (ref 0.6–0.9)
ECHO LV RELATIVE WALL THICKNESS RATIO: 0.51
ECHO LVOT AREA: 2.8 CM2
ECHO LVOT AV VTI INDEX: 0.87
ECHO LVOT DIAM: 1.9 CM
ECHO LVOT MEAN GRADIENT: 2 MMHG
ECHO LVOT PEAK GRADIENT: 3 MMHG
ECHO LVOT PEAK VELOCITY: 0.9 M/S
ECHO LVOT STROKE VOLUME INDEX: 27.9 ML/M2
ECHO LVOT SV: 65.7 ML
ECHO LVOT VTI: 23.2 CM
ECHO MV A VELOCITY: 1.29 M/S
ECHO MV AREA VTI: 1.8 CM2
ECHO MV E VELOCITY: 0.96 M/S
ECHO MV E/A RATIO: 0.74
ECHO MV E/E' LATERAL: 23.3
ECHO MV E/E' RATIO (AVERAGED): 22.09
ECHO MV E/E' SEPTAL: 20.87
ECHO MV LVOT VTI INDEX: 1.54
ECHO MV MAX VELOCITY: 1.4 M/S
ECHO MV MEAN GRADIENT: 3 MMHG
ECHO MV MEAN VELOCITY: 0.9 M/S
ECHO MV PEAK GRADIENT: 8 MMHG
ECHO MV VTI: 35.7 CM
ECHO PULMONARY ARTERY END DIASTOLIC PRESSURE: 5 MMHG
ECHO PV MAX VELOCITY: 0.8 M/S
ECHO PV PEAK GRADIENT: 2 MMHG
ECHO PV REGURGITANT MAX VELOCITY: 1.2 M/S
ECHO RA AREA 4C: 16.1 CM2
ECHO RA END SYSTOLIC VOLUME APICAL 4 CHAMBER INDEX BSA: 17 ML/M2
ECHO RA VOLUME: 40 ML
ECHO RV BASAL DIMENSION: 3.3 CM
ECHO RV FREE WALL PEAK S': 11.4 CM/S
ECHO RV MID DIMENSION: 2.7 CM
ECHO RV TAPSE: 1.7 CM (ref 1.7–?)
EOSINOPHIL # BLD: 0.2 K/UL (ref 0–0.6)
EOSINOPHIL NFR BLD: 3.4 %
GFR SERPLBLD CREATININE-BSD FMLA CKD-EPI: 70 ML/MIN/{1.73_M2}
GLUCOSE BLD-MCNC: 108 MG/DL (ref 70–99)
GLUCOSE BLD-MCNC: 85 MG/DL (ref 70–99)
GLUCOSE SERPL-MCNC: 121 MG/DL (ref 70–99)
HCT VFR BLD AUTO: 35.6 % (ref 36–48)
HGB BLD-MCNC: 12 G/DL (ref 12–16)
LYMPHOCYTES # BLD: 1.8 K/UL (ref 1–5.1)
LYMPHOCYTES NFR BLD: 36.5 %
MCH RBC QN AUTO: 27.7 PG (ref 26–34)
MCHC RBC AUTO-ENTMCNC: 33.7 G/DL (ref 31–36)
MCV RBC AUTO: 82.1 FL (ref 80–100)
MONOCYTES # BLD: 0.4 K/UL (ref 0–1.3)
MONOCYTES NFR BLD: 8.8 %
NEUTROPHILS # BLD: 2.4 K/UL (ref 1.7–7.7)
NEUTROPHILS NFR BLD: 50.3 %
PERFORMED ON: ABNORMAL
PERFORMED ON: NORMAL
PLATELET # BLD AUTO: 275 K/UL (ref 135–450)
PMV BLD AUTO: 7.7 FL (ref 5–10.5)
POTASSIUM SERPL-SCNC: 4.2 MMOL/L (ref 3.5–5.1)
PROT SERPL-MCNC: 7.2 G/DL (ref 6.4–8.2)
RBC # BLD AUTO: 4.34 M/UL (ref 4–5.2)
SODIUM SERPL-SCNC: 140 MMOL/L (ref 136–145)
TROPONIN, HIGH SENSITIVITY: 12 NG/L (ref 0–14)
TROPONIN, HIGH SENSITIVITY: 13 NG/L (ref 0–14)
WBC # BLD AUTO: 4.8 K/UL (ref 4–11)

## 2025-01-30 PROCEDURE — G8427 DOCREV CUR MEDS BY ELIG CLIN: HCPCS | Performed by: NURSE PRACTITIONER

## 2025-01-30 PROCEDURE — 99285 EMERGENCY DEPT VISIT HI MDM: CPT

## 2025-01-30 PROCEDURE — C8929 TTE W OR WO FOL WCON,DOPPLER: HCPCS

## 2025-01-30 PROCEDURE — 3079F DIAST BP 80-89 MM HG: CPT | Performed by: NURSE PRACTITIONER

## 2025-01-30 PROCEDURE — 6360000004 HC RX CONTRAST MEDICATION: Performed by: INTERNAL MEDICINE

## 2025-01-30 PROCEDURE — 6370000000 HC RX 637 (ALT 250 FOR IP): Performed by: INTERNAL MEDICINE

## 2025-01-30 PROCEDURE — G8417 CALC BMI ABV UP PARAM F/U: HCPCS | Performed by: NURSE PRACTITIONER

## 2025-01-30 PROCEDURE — 96372 THER/PROPH/DIAG INJ SC/IM: CPT

## 2025-01-30 PROCEDURE — G0378 HOSPITAL OBSERVATION PER HR: HCPCS

## 2025-01-30 PROCEDURE — 3077F SYST BP >= 140 MM HG: CPT | Performed by: NURSE PRACTITIONER

## 2025-01-30 PROCEDURE — G8400 PT W/DXA NO RESULTS DOC: HCPCS | Performed by: NURSE PRACTITIONER

## 2025-01-30 PROCEDURE — 84484 ASSAY OF TROPONIN QUANT: CPT

## 2025-01-30 PROCEDURE — 1159F MED LIST DOCD IN RCRD: CPT | Performed by: NURSE PRACTITIONER

## 2025-01-30 PROCEDURE — 93005 ELECTROCARDIOGRAM TRACING: CPT | Performed by: INTERNAL MEDICINE

## 2025-01-30 PROCEDURE — 1090F PRES/ABSN URINE INCON ASSESS: CPT | Performed by: NURSE PRACTITIONER

## 2025-01-30 PROCEDURE — 99223 1ST HOSP IP/OBS HIGH 75: CPT | Performed by: INTERNAL MEDICINE

## 2025-01-30 PROCEDURE — 1123F ACP DISCUSS/DSCN MKR DOCD: CPT | Performed by: NURSE PRACTITIONER

## 2025-01-30 PROCEDURE — 71045 X-RAY EXAM CHEST 1 VIEW: CPT

## 2025-01-30 PROCEDURE — 85025 COMPLETE CBC W/AUTO DIFF WBC: CPT

## 2025-01-30 PROCEDURE — 80053 COMPREHEN METABOLIC PANEL: CPT

## 2025-01-30 PROCEDURE — 6360000002 HC RX W HCPCS: Performed by: INTERNAL MEDICINE

## 2025-01-30 PROCEDURE — 99214 OFFICE O/P EST MOD 30 MIN: CPT | Performed by: NURSE PRACTITIONER

## 2025-01-30 PROCEDURE — 1036F TOBACCO NON-USER: CPT | Performed by: NURSE PRACTITIONER

## 2025-01-30 PROCEDURE — 93306 TTE W/DOPPLER COMPLETE: CPT | Performed by: INTERNAL MEDICINE

## 2025-01-30 PROCEDURE — 2500000003 HC RX 250 WO HCPCS: Performed by: INTERNAL MEDICINE

## 2025-01-30 PROCEDURE — 3017F COLORECTAL CA SCREEN DOC REV: CPT | Performed by: NURSE PRACTITIONER

## 2025-01-30 RX ORDER — ACETAMINOPHEN 650 MG/1
650 SUPPOSITORY RECTAL EVERY 6 HOURS PRN
Status: DISCONTINUED | OUTPATIENT
Start: 2025-01-30 | End: 2025-02-01 | Stop reason: HOSPADM

## 2025-01-30 RX ORDER — DEXTROSE MONOHYDRATE 100 MG/ML
INJECTION, SOLUTION INTRAVENOUS CONTINUOUS PRN
Status: DISCONTINUED | OUTPATIENT
Start: 2025-01-30 | End: 2025-02-01 | Stop reason: HOSPADM

## 2025-01-30 RX ORDER — INSULIN LISPRO 100 [IU]/ML
0-8 INJECTION, SOLUTION INTRAVENOUS; SUBCUTANEOUS
Status: DISCONTINUED | OUTPATIENT
Start: 2025-01-30 | End: 2025-01-30

## 2025-01-30 RX ORDER — SODIUM CHLORIDE 0.9 % (FLUSH) 0.9 %
5-40 SYRINGE (ML) INJECTION PRN
Status: DISCONTINUED | OUTPATIENT
Start: 2025-01-30 | End: 2025-02-01 | Stop reason: HOSPADM

## 2025-01-30 RX ORDER — CLOPIDOGREL BISULFATE 75 MG/1
75 TABLET ORAL DAILY
Status: DISCONTINUED | OUTPATIENT
Start: 2025-01-31 | End: 2025-02-01 | Stop reason: HOSPADM

## 2025-01-30 RX ORDER — POLYETHYLENE GLYCOL 3350 17 G/17G
17 POWDER, FOR SOLUTION ORAL DAILY PRN
Status: DISCONTINUED | OUTPATIENT
Start: 2025-01-30 | End: 2025-02-01 | Stop reason: HOSPADM

## 2025-01-30 RX ORDER — ATORVASTATIN CALCIUM 80 MG/1
80 TABLET, FILM COATED ORAL NIGHTLY
Status: DISCONTINUED | OUTPATIENT
Start: 2025-01-30 | End: 2025-02-01 | Stop reason: HOSPADM

## 2025-01-30 RX ORDER — SODIUM CHLORIDE 9 MG/ML
INJECTION, SOLUTION INTRAVENOUS PRN
Status: DISCONTINUED | OUTPATIENT
Start: 2025-01-30 | End: 2025-02-01 | Stop reason: HOSPADM

## 2025-01-30 RX ORDER — ALBUTEROL SULFATE 90 UG/1
2 INHALANT RESPIRATORY (INHALATION) EVERY 6 HOURS PRN
Status: DISCONTINUED | OUTPATIENT
Start: 2025-01-30 | End: 2025-02-01 | Stop reason: HOSPADM

## 2025-01-30 RX ORDER — PREGABALIN 100 MG/1
200 CAPSULE ORAL NIGHTLY
Status: DISCONTINUED | OUTPATIENT
Start: 2025-01-30 | End: 2025-02-01 | Stop reason: HOSPADM

## 2025-01-30 RX ORDER — AMLODIPINE BESYLATE 5 MG/1
10 TABLET ORAL DAILY
Status: DISCONTINUED | OUTPATIENT
Start: 2025-01-30 | End: 2025-02-01 | Stop reason: HOSPADM

## 2025-01-30 RX ORDER — ACETAMINOPHEN 325 MG/1
650 TABLET ORAL EVERY 6 HOURS PRN
Status: DISCONTINUED | OUTPATIENT
Start: 2025-01-30 | End: 2025-02-01 | Stop reason: HOSPADM

## 2025-01-30 RX ORDER — INSULIN GLARGINE 100 [IU]/ML
52 INJECTION, SOLUTION SUBCUTANEOUS DAILY
Status: DISCONTINUED | OUTPATIENT
Start: 2025-01-31 | End: 2025-02-01 | Stop reason: HOSPADM

## 2025-01-30 RX ORDER — INSULIN LISPRO 100 [IU]/ML
0-8 INJECTION, SOLUTION INTRAVENOUS; SUBCUTANEOUS
Status: DISCONTINUED | OUTPATIENT
Start: 2025-01-30 | End: 2025-02-01 | Stop reason: HOSPADM

## 2025-01-30 RX ORDER — ASPIRIN 81 MG/1
81 TABLET, CHEWABLE ORAL DAILY
Status: DISCONTINUED | OUTPATIENT
Start: 2025-01-31 | End: 2025-02-01 | Stop reason: HOSPADM

## 2025-01-30 RX ORDER — ENOXAPARIN SODIUM 100 MG/ML
30 INJECTION SUBCUTANEOUS 2 TIMES DAILY
Status: DISCONTINUED | OUTPATIENT
Start: 2025-01-30 | End: 2025-02-01 | Stop reason: HOSPADM

## 2025-01-30 RX ORDER — SODIUM CHLORIDE 0.9 % (FLUSH) 0.9 %
5-40 SYRINGE (ML) INJECTION EVERY 12 HOURS SCHEDULED
Status: DISCONTINUED | OUTPATIENT
Start: 2025-01-30 | End: 2025-02-01 | Stop reason: HOSPADM

## 2025-01-30 RX ORDER — METOPROLOL SUCCINATE 25 MG/1
50 TABLET, EXTENDED RELEASE ORAL 2 TIMES DAILY
Qty: 60 TABLET | Refills: 5
Start: 2025-01-30

## 2025-01-30 RX ORDER — METOPROLOL SUCCINATE 50 MG/1
50 TABLET, EXTENDED RELEASE ORAL 2 TIMES DAILY
Status: DISCONTINUED | OUTPATIENT
Start: 2025-01-30 | End: 2025-02-01 | Stop reason: HOSPADM

## 2025-01-30 RX ORDER — ISOSORBIDE MONONITRATE 30 MG/1
30 TABLET, EXTENDED RELEASE ORAL DAILY
Status: DISCONTINUED | OUTPATIENT
Start: 2025-01-31 | End: 2025-02-01 | Stop reason: HOSPADM

## 2025-01-30 RX ORDER — INSULIN LISPRO 100 [IU]/ML
0-16 INJECTION, SOLUTION INTRAVENOUS; SUBCUTANEOUS
Status: DISCONTINUED | OUTPATIENT
Start: 2025-01-30 | End: 2025-01-30

## 2025-01-30 RX ORDER — OXYCODONE AND ACETAMINOPHEN 10; 325 MG/1; MG/1
1 TABLET ORAL 3 TIMES DAILY PRN
Status: DISCONTINUED | OUTPATIENT
Start: 2025-01-30 | End: 2025-02-01 | Stop reason: HOSPADM

## 2025-01-30 RX ADMIN — ATORVASTATIN CALCIUM 80 MG: 80 TABLET, FILM COATED ORAL at 20:39

## 2025-01-30 RX ADMIN — METOPROLOL SUCCINATE 50 MG: 50 TABLET, EXTENDED RELEASE ORAL at 20:38

## 2025-01-30 RX ADMIN — OXYCODONE AND ACETAMINOPHEN 1 TABLET: 10; 325 TABLET ORAL at 16:48

## 2025-01-30 RX ADMIN — ENOXAPARIN SODIUM 30 MG: 100 INJECTION SUBCUTANEOUS at 20:39

## 2025-01-30 RX ADMIN — SULFUR HEXAFLUORIDE 2 ML: 60.7; .19; .19 INJECTION, POWDER, LYOPHILIZED, FOR SUSPENSION INTRAVENOUS; INTRAVESICAL at 16:16

## 2025-01-30 RX ADMIN — SODIUM CHLORIDE, PRESERVATIVE FREE 10 ML: 5 INJECTION INTRAVENOUS at 20:39

## 2025-01-30 RX ADMIN — AMLODIPINE BESYLATE 10 MG: 5 TABLET ORAL at 18:46

## 2025-01-30 RX ADMIN — PREGABALIN 200 MG: 100 CAPSULE ORAL at 20:39

## 2025-01-30 SDOH — ECONOMIC STABILITY: FOOD INSECURITY: WITHIN THE PAST 12 MONTHS, YOU WORRIED THAT YOUR FOOD WOULD RUN OUT BEFORE YOU GOT MONEY TO BUY MORE.: NEVER TRUE

## 2025-01-30 SDOH — ECONOMIC STABILITY: FOOD INSECURITY: WITHIN THE PAST 12 MONTHS, THE FOOD YOU BOUGHT JUST DIDN'T LAST AND YOU DIDN'T HAVE MONEY TO GET MORE.: NEVER TRUE

## 2025-01-30 ASSESSMENT — PATIENT HEALTH QUESTIONNAIRE - PHQ9
4. FEELING TIRED OR HAVING LITTLE ENERGY: NEARLY EVERY DAY
9. THOUGHTS THAT YOU WOULD BE BETTER OFF DEAD, OR OF HURTING YOURSELF: NOT AT ALL
SUM OF ALL RESPONSES TO PHQ QUESTIONS 1-9: 6
6. FEELING BAD ABOUT YOURSELF - OR THAT YOU ARE A FAILURE OR HAVE LET YOURSELF OR YOUR FAMILY DOWN: NOT AT ALL
7. TROUBLE CONCENTRATING ON THINGS, SUCH AS READING THE NEWSPAPER OR WATCHING TELEVISION: NOT AT ALL
8. MOVING OR SPEAKING SO SLOWLY THAT OTHER PEOPLE COULD HAVE NOTICED. OR THE OPPOSITE, BEING SO FIGETY OR RESTLESS THAT YOU HAVE BEEN MOVING AROUND A LOT MORE THAN USUAL: NOT AT ALL
5. POOR APPETITE OR OVEREATING: NOT AT ALL
SUM OF ALL RESPONSES TO PHQ QUESTIONS 1-9: 6
SUM OF ALL RESPONSES TO PHQ QUESTIONS 1-9: 6
1. LITTLE INTEREST OR PLEASURE IN DOING THINGS: NOT AT ALL
2. FEELING DOWN, DEPRESSED OR HOPELESS: NOT AT ALL
10. IF YOU CHECKED OFF ANY PROBLEMS, HOW DIFFICULT HAVE THESE PROBLEMS MADE IT FOR YOU TO DO YOUR WORK, TAKE CARE OF THINGS AT HOME, OR GET ALONG WITH OTHER PEOPLE: NOT DIFFICULT AT ALL
SUM OF ALL RESPONSES TO PHQ9 QUESTIONS 1 & 2: 0
SUM OF ALL RESPONSES TO PHQ QUESTIONS 1-9: 6
3. TROUBLE FALLING OR STAYING ASLEEP: NEARLY EVERY DAY

## 2025-01-30 ASSESSMENT — PAIN DESCRIPTION - LOCATION
LOCATION: ARM
LOCATION: KNEE
LOCATION: KNEE
LOCATION: ARM

## 2025-01-30 ASSESSMENT — PAIN SCALES - GENERAL
PAINLEVEL_OUTOF10: 8
PAINLEVEL_OUTOF10: 7
PAINLEVEL_OUTOF10: 6
PAINLEVEL_OUTOF10: 6
PAINLEVEL_OUTOF10: 7

## 2025-01-30 ASSESSMENT — PAIN DESCRIPTION - DESCRIPTORS
DESCRIPTORS: ACHING

## 2025-01-30 ASSESSMENT — LIFESTYLE VARIABLES
HOW MANY STANDARD DRINKS CONTAINING ALCOHOL DO YOU HAVE ON A TYPICAL DAY: 1 OR 2
HOW MANY STANDARD DRINKS CONTAINING ALCOHOL DO YOU HAVE ON A TYPICAL DAY: 1 OR 2
HOW OFTEN DO YOU HAVE A DRINK CONTAINING ALCOHOL: 4 OR MORE TIMES A WEEK
HOW OFTEN DO YOU HAVE A DRINK CONTAINING ALCOHOL: MONTHLY OR LESS

## 2025-01-30 ASSESSMENT — PAIN DESCRIPTION - PAIN TYPE
TYPE: CHRONIC PAIN
TYPE: ACUTE PAIN

## 2025-01-30 ASSESSMENT — PAIN - FUNCTIONAL ASSESSMENT: PAIN_FUNCTIONAL_ASSESSMENT: NONE - DENIES PAIN

## 2025-01-30 ASSESSMENT — PAIN DESCRIPTION - FREQUENCY: FREQUENCY: INTERMITTENT

## 2025-01-30 ASSESSMENT — HEART SCORE: ECG: NORMAL

## 2025-01-30 ASSESSMENT — PAIN DESCRIPTION - ORIENTATION
ORIENTATION: RIGHT
ORIENTATION: RIGHT;LEFT
ORIENTATION: RIGHT
ORIENTATION: RIGHT;LEFT

## 2025-01-30 NOTE — CONSULTS
151-199 add 2 units, 200-249 add 4 units, 250-299 add 6 units, 300-349 add 8 units, 350+ add 10 units) 1/9/24  Yes Melanie Herron MD   albuterol sulfate HFA (PROAIR HFA) 108 (90 Base) MCG/ACT inhaler Inhale 2 puffs into the lungs every 6 hours as needed for Wheezing 10/28/22  Yes ANNE-MARIE Bird MD   nitroGLYCERIN (NITROSTAT) 0.4 MG SL tablet PLACE 1 TABLET UNDER TONGUE EVERY 5 MINUTES AS NEEDED FOR CHEST PAIN. MAX OF 3 DOSES  Patient taking differently: Place 1 tablet under the tongue every 5 minutes as needed for Chest pain PLACE 1 TABLET UNDER TONGUE EVERY 5 MINUTES AS NEEDED FOR CHEST PAIN. MAX OF 3 DOSES 10/28/22  Yes ANNE-MARIE Bird MD   oxyCODONE-acetaminophen (PERCOCET)  MG per tablet Take 1 tablet by mouth 3 times daily as needed for Pain. Per pain management at [facility]   Yes Provider, MD Oscar   blood glucose monitor strips Test 3 times a day & as needed for symptoms of irregular blood glucose. Dispense sufficient amount for indicated testing frequency plus additional to accommodate PRN testing needs. 9/19/24   ANNE-MARIE Bird MD   Insulin Pen Needle 32G X 4 MM MISC Use to give LA and SA insulin 5 x daily 1/9/24   Melanie Herron MD   blood glucose monitor kit and supplies Dispense sufficient amount for indicated testing frequency plus additional to accommodate PRN testing needs. Dispense all needed supplies to include: monitor, strips, lancing device, lancets, control solutions, alcohol swabs. 1/8/24   Lien Smith MD   Lancets MISC 1 each by Does not apply route 3 times daily 1/8/24   Lien Smith MD   Blood Pressure KIT Use as directed 11/16/20   ANNE-MARIE Bird MD   UNABLE TO FIND Glucometer #1, E11.9, testing TID. ON INSULIN.  Home pt.  DISPENSE METER BEST COVERED BY INSURANCE 10/13/17   ANNE-MARIE Bird MD        Current Medications:  No current facility-administered medications for this encounter.     Current Outpatient Medications   Medication Sig Dispense Refill     circumflex artery Non-dominant vessel that gives rise to 2 obtuse marginal arteries nml   Obtuse Marginals   nml      Right coronary artery Dominant vessel that gives rise to the posterior descending artery and posterolateral branch nml   Posterior descending artery Posterior lateral branch   nml  nml     INTERVENTION:  Lesion 1, PCI of LAD:  ~Guide catheter: Accessing and selectively catheterizing with the xb 3.5  ~Coronary wire: Traversing the lesion with a desirae blue  ~Additional Equipment: none  ~Pre-dilation Balloon: 3.0x15  ~Drug Eluting Stent: 3.25x28 xience  ~Post-dilation Balloon: none  Results of Intervention   Pre - 99% stenosis, ALEX 2 flow  Post - 0% stenosis, ALEX 3 flow    MRI/EP/Other:   none    Assessment  Patient Active Problem List   Diagnosis    OM (osteomyelitis)    Arthritis    Osteomyelitis of spine    Diskitis    Opiate analgesic use agreement exists    Type 2 diabetes mellitus with diabetic neuropathy (HCC)    Insomnia    Submucous leiomyoma of uterus    PMB (postmenopausal bleeding)    Adiposity    Osteoarthritis of hand    Urticaria    Muscle spasms of both lower extremities    Mild intermittent asthma without complication    Chest pain    Abnormal EKG    Essential hypertension    Age-related cataract of both eyes    Abnormal myocardial perfusion study    Familial hypercholesterolemia    Pneumonia    Recurrent major depressive disorder, in full remission (HCC)    Chronic systolic (congestive) heart failure    Pharyngitis    NSTEMI (non-ST elevated myocardial infarction) (HCC)    Moderate episode of recurrent major depressive disorder (HCC)    Atherosclerosis of native coronary artery of native heart without angina pectoris    Morbid obesity with BMI of 45.0-49.9, adult       I had the opportunity to review the clinical symptoms and presentation of Belkis Morton.     Atypical chest pain similar to what preceded prior MI  Hypertensive urgency  DM  - Recent LHC as above, trops negative, EKG

## 2025-01-30 NOTE — PROGRESS NOTES
Per pt, she did not receive her lantus this morning. RN called pharmacy, d/t last blood sugar of 85, lantus will not be given at this time per pharmacy.

## 2025-01-30 NOTE — H&P
HOSPITALISTS HISTORY AND PHYSICAL    1/30/2025 5:52 PM    Patient Information:  FANNY MORTON is a 64 y.o. female [id number]  PCP:  ANNE-MARIE Bird MD (Tel: [phone] )    Chief complaint:    Chief Complaint   Patient presents with    Arm Pain     Pt with right arm pain, upper arm, not worse with movement, hx of mi and felt similar, sent by PCP, has had to use inhaler more as well       History of Present Illness:  Fanny Morton is a 64 y.o. female presents to the hospital with 1 day history of on and off right arm pain along with some exertional shortness of breath no chest pain.  Patient had MI last year with LAD stent with right arm pain that preceded midsternal chest pain and she is afraid she might be developing another heart attack.  Patient denies any fevers chills or sick contacts has been on aspirin and Plavix for 1 year but missed 2 doses of aspirin and has ran out.  Does not smoke or drink does have a history of diabetes mellitus.  Patient's blood pressure when she was in the ED was 190 over 100 degrees and has stopped taking Norvasc    REVIEW OF SYSTEMS:   Constitutional: Negative for fever,chills or night sweats  ENT: Negative for rhinorrhea, epistaxis, hoarseness, sore throat.  Gastrointestinal: Negative for nausea, vomiting, diarrhea  Genitourinary: Negative for polyuria, dysuria   Hematologic/Lymphatic: Negative for bleeding tendency, easy bruising    Neurologic: Negative for confusion,dysarthria.  Skin: Negative for itching,rash  Psychiatric: Negative for depression,anxiety, agitation.  Endocrine: Negative for polydipsia,polyuria,heat /cold intolerance.    Past Medical History:   has a past medical history of Anxiety, Arthritis, Asthma, Bilateral chronic knee pain, CAD (coronary artery disease), Degenerative disc disease, cervical, Depression, Diabetes, High blood pressure, Hypertriglyceridemia,

## 2025-01-30 NOTE — PROGRESS NOTES
2 units insulin for every 50 mg/dL > 150 mg/dL; 151-199 add 2 units, 200-249 add 4 units, 250-299 add 6 units, 300-349 add 8 units, 350+ add 10 units) 5 Adjustable Dose Pre-filled Pen Syringe 2    blood glucose monitor kit and supplies Dispense sufficient amount for indicated testing frequency plus additional to accommodate PRN testing needs. Dispense all needed supplies to include: monitor, strips, lancing device, lancets, control solutions, alcohol swabs. 1 kit 0    Lancets MISC 1 each by Does not apply route 3 times daily 100 each 3    albuterol sulfate HFA (PROAIR HFA) 108 (90 Base) MCG/ACT inhaler Inhale 2 puffs into the lungs every 6 hours as needed for Wheezing 1 each 3    nitroGLYCERIN (NITROSTAT) 0.4 MG SL tablet PLACE 1 TABLET UNDER TONGUE EVERY 5 MINUTES AS NEEDED FOR CHEST PAIN. MAX OF 3 DOSES 25 tablet 3    oxyCODONE-acetaminophen (PERCOCET)  MG per tablet Take 1 tablet by mouth 3 times daily as needed for Pain. Per pain management at [facility]      Blood Pressure KIT Use as directed 1 kit 0    UNABLE TO FIND Glucometer #1, E11.9, testing TID. ON INSULIN.  Home pt.  DISPENSE METER BEST COVERED BY INSURANCE 1 each 0    semaglutide, 2 MG/DOSE, (OZEMPIC) 8 MG/3ML SOPN sc injection Inject 2 mg into the skin every 7 days 9 mL 2    furosemide (LASIX) 20 MG tablet Take 1 tablet by mouth daily 90 tablet 3     Patient Active Problem List   Diagnosis    OM (osteomyelitis)    Arthritis    Osteomyelitis of spine    Diskitis    Opiate analgesic use agreement exists    Type 2 diabetes mellitus with diabetic neuropathy (HCC)    Insomnia    Submucous leiomyoma of uterus    PMB (postmenopausal bleeding)    Adiposity    Osteoarthritis of hand    Urticaria    Muscle spasms of both lower extremities    Mild intermittent asthma without complication    Chest pain    Abnormal EKG    Essential hypertension    Age-related cataract of both eyes    Abnormal myocardial perfusion study    Familial hypercholesterolemia    Pneumonia

## 2025-01-30 NOTE — PROGRESS NOTES
Patient seen in ED, room 28.  Admission completed with the following exceptions:  4 Eyes Assessment, Immunizations, Vaccines, Rights and Responsibilities, Orientation to room, Plan of Care, Education/Learning Assessment and Education Plan, white board, height and weight, pain assessment and head to toe assessment.  Patient is alert and oriented X 4.  Patient lives in an apartment alone and is being admitted for Chest pain.  Home Medication reconciliation will be/has been completed by Pharmacy Staff.  All patient's and/or family's questions answered.

## 2025-01-30 NOTE — ASSESSMENT & PLAN NOTE
-chronic, uncontrolled.  Blood pressure readings elevated this visit, recheck with no improvement , remained higher than target goal.  Patient stopped amlodipine per her dentist.  Patient was advised to increase her metoprolol succinate 50 mg twice a day and continue Imdur 30 mg daily.  She is to follow-up with her primary care provider, Dr. Bird in 1 to 2 weeks for blood pressure evaluation

## 2025-01-30 NOTE — ACP (ADVANCE CARE PLANNING)
Advanced Care Planning Note.    Purpose of Encounter: Advanced care planning in light of hospitalization  Parties In Attendance: Patient,    Decisional Capacity: Yes  Subjective: Patient  understand that this conversation is to address long term care goal  Objective: Patient admitted to the hospital atypical chest pain hypertensive urgency history of coronary artery disease  Goals of Care Determination: Patient would pursue CPR and Intubation if required  Code Status: full code  Time spent on Advanced care Plannin minutes  Advanced Care Planning Documents: documented patient's wishes, would like Flaca Edwards to make medical decisions if unable to make decisions    Savage Louie MD  2025 5:56 PM

## 2025-01-30 NOTE — PROGRESS NOTES
Pharmacy Home Medication Reconciliation Note    A medication reconciliation has been completed for Belkis Morton 1960    Pharmacy: [facility] [address] [place] [address]  Information provided by: patient    The patient's home medication list is as follows:  No current facility-administered medications on file prior to encounter.     Current Outpatient Medications on File Prior to Encounter   Medication Sig Dispense Refill    metoprolol succinate (TOPROL XL) 25 MG extended release tablet Take 2 tablets by mouth in the morning and at bedtime 60 tablet 5    empagliflozin (JARDIANCE) 10 MG tablet Take 1 tablet by mouth daily 30 tablet 0    insulin glargine (LANTUS SOLOSTAR) 100 UNIT/ML injection pen INJECT 52 UNITS INTO THE SKIN NIGHTLY (Patient taking differently: Inject 52 Units into the skin every morning) 47 mL 0    isosorbide mononitrate (IMDUR) 30 MG extended release tablet Take 1 tablet by mouth daily 30 tablet 5    clopidogrel (PLAVIX) 75 MG tablet Take 1 tablet by mouth daily 90 tablet 3    atorvastatin (LIPITOR) 80 MG tablet Take 1 tablet by mouth nightly 90 tablet 3    pregabalin (LYRICA) 100 MG capsule Take 2 capsules by mouth nightly.      semaglutide, 2 MG/DOSE, (OZEMPIC) 8 MG/3ML SOPN sc injection Inject 2 mg into the skin every 7 days (Patient taking differently: Inject 2 mg into the skin every 7 days Mondays.) 9 mL 2    ibuprofen (ADVIL;MOTRIN) 600 MG tablet Take 1 tablet by mouth 3 times daily as needed for Pain 30 tablet 0    furosemide (LASIX) 20 MG tablet Take 1 tablet by mouth daily (Patient taking differently: Take 1 tablet by mouth daily as needed (Swelling/weight gain.)) 90 tablet 3    aspirin 81 MG chewable tablet Take 1 tablet by mouth daily (Patient taking differently: Take 2 tablets by mouth daily) 30 tablet 5    insulin lispro (HUMALOG KWIKPEN) 200 UNIT/ML SOPN pen Inject 10 Units into the skin 3 times daily as needed for High Blood Sugar (sliding scale: add 2 units insulin

## 2025-01-30 NOTE — ED PROVIDER NOTES
[facility] EMERGENCY DEPARTMENT  EMERGENCY DEPARTMENT ENCOUNTER        Pt Name: Belkis Morton  MRN: [id number]  Birthdate 1960  Date of evaluation: 1/30/2025  Provider: MONSERRAT Mckay  PCP: ANNE-MARIE Bird MD  Note Started: 11:40 AM EST 1/30/25      DEREK. I have evaluated this patient.        CHIEF COMPLAINT       Chief Complaint   Patient presents with    Arm Pain     Pt with right arm pain, upper arm, not worse with movement, hx of mi and felt similar, sent by PCP, has had to use inhaler more as well       HISTORY OF PRESENT ILLNESS: 1 or more Elements     History from : Patient    Limitations to history : None    Belkis Morton is a 64 y.o. female who presents to the emergency room due to right arm pain that has been going on for the last couple of days.  Patient states that she has a history of MI with stent placement 1 year ago and states that she had right arm pain with that as well and was concerned that this may be similar symptoms from her previous MI.  She denies any chest pain shortness of breath and difficulty breathing at this time.  She does take Percocet for chronic pain and states that she took her pain medication this morning and is not currently having any right arm pain.  She states that since this right arm pain started couple days ago has been intermittent throughout the day.  She states that the pain is not worse with any certain type of movement and she denies any numbness or tingling down the arm.  She denies any lower leg swelling or calf pain.    Nursing Notes were all reviewed and agreed with or any disagreements were addressed in the HPI.    REVIEW OF SYSTEMS :      Review of Systems   Musculoskeletal:         Right arm pain       Positives and Pertinent negatives as per HPI.     SURGICAL HISTORY     Past Surgical History:   Procedure Laterality Date    CARDIAC CATHETERIZATION  11/2017    1.  Diffusely diseased distal LAD.  Distal LAD has a 50% narrowing in  two ---    COLONOSCOPY N/A 8/15/2024    COLONOSCOPY DIAGNOSTIC performed by Jesus Lou MD at [facility] ENDOSCOPY    CORONARY STENT PLACEMENT  01/08/2024    EYE SURGERY Bilateral     cataract       CURRENTMEDICATIONS       Previous Medications    ALBUTEROL SULFATE HFA (PROAIR HFA) 108 (90 BASE) MCG/ACT INHALER    Inhale 2 puffs into the lungs every 6 hours as needed for Wheezing    ASPIRIN 81 MG CHEWABLE TABLET    Take 1 tablet by mouth daily    ATORVASTATIN (LIPITOR) 80 MG TABLET    Take 1 tablet by mouth nightly    BLOOD GLUCOSE MONITOR KIT AND SUPPLIES    Dispense sufficient amount for indicated testing frequency plus additional to accommodate PRN testing needs. Dispense all needed supplies to include: monitor, strips, lancing device, lancets, control solutions, alcohol swabs.    BLOOD GLUCOSE MONITOR STRIPS    Test 3 times a day & as needed for symptoms of irregular blood glucose. Dispense sufficient amount for indicated testing frequency plus additional to accommodate PRN testing needs.    BLOOD PRESSURE KIT    Use as directed    CLOPIDOGREL (PLAVIX) 75 MG TABLET    Take 1 tablet by mouth daily    EMPAGLIFLOZIN (JARDIANCE) 10 MG TABLET    Take 1 tablet by mouth daily    FUROSEMIDE (LASIX) 20 MG TABLET    Take 1 tablet by mouth daily    IBUPROFEN (ADVIL;MOTRIN) 600 MG TABLET    Take 1 tablet by mouth 3 times daily as needed for Pain    INSULIN GLARGINE (LANTUS SOLOSTAR) 100 UNIT/ML INJECTION PEN    INJECT 52 UNITS INTO THE SKIN NIGHTLY    INSULIN LISPRO (HUMALOG KWIKPEN) 200 UNIT/ML SOPN PEN    Inject 10 Units into the skin 3 times daily as needed for High Blood Sugar (sliding scale: add 2 units insulin for every 50 mg/dL > 150 mg/dL; 151-199 add 2 units, 200-249 add 4 units, 250-299 add 6 units, 300-349 add 8 units, 350+ add 10 units)    INSULIN PEN NEEDLE 32G X 4 MM MISC    Use to give LA and SA insulin 5 x daily    ISOSORBIDE MONONITRATE (IMDUR) 30 MG EXTENDED RELEASE TABLET    Take 1 tablet by mouth

## 2025-01-30 NOTE — ED NOTES
Patient Name: Belkis Morton  : 1960 64 y.o.  MRN: [id number]  ED Room #: ED-0028/28     Chief complaint:   Chief Complaint   Patient presents with    Arm Pain     Pt with right arm pain, upper arm, not worse with movement, hx of mi and felt similar, sent by PCP, has had to use inhaler more as well     Hospital Problem/Diagnosis:   Hospital Problems             Last Modified POA    * (Principal) Chest pain 2025 Yes         O2 Flow Rate:O2 Device: None (Room air)   (if applicable)  Cardiac Rhythm:   (if applicable)  Active LDA's:           How does patient ambulate? Low Fall Risk (Ambulates by themselves without support    2. How does patient take pills? Whole with Water    3. Is patient alert? Alert    4. Is patient oriented? To Person, To Place, To Time, and To Situation    5.   Patient arrived from:  home  Facility Name: ___________________________________________    6. If patient is disoriented or from a Skill Nursing Facility has family been notified of admission? No    7. Patient belongings? Documented in belongings list    Disposition of belongings? Kept with Patient     8. Any specific patient or family belongings/needs/dynamics?   a.     9. Miscellaneous comments/pending orders?  A. Pt sts right arm pain is how previous MI presented.      If there are any additional questions please reach out to the Emergency Department.

## 2025-01-31 LAB
ANION GAP SERPL CALCULATED.3IONS-SCNC: 10 MMOL/L (ref 3–16)
BASOPHILS # BLD: 0 K/UL (ref 0–0.2)
BASOPHILS NFR BLD: 0.4 %
BUN SERPL-MCNC: 14 MG/DL (ref 7–20)
CALCIUM SERPL-MCNC: 8.5 MG/DL (ref 8.3–10.6)
CHLORIDE SERPL-SCNC: 106 MMOL/L (ref 99–110)
CO2 SERPL-SCNC: 26 MMOL/L (ref 21–32)
CREAT SERPL-MCNC: 0.9 MG/DL (ref 0.6–1.2)
DEPRECATED RDW RBC AUTO: 16.6 % (ref 12.4–15.4)
EKG ATRIAL RATE: 78 BPM
EKG DIAGNOSIS: NORMAL
EKG P AXIS: 44 DEGREES
EKG P-R INTERVAL: 164 MS
EKG Q-T INTERVAL: 428 MS
EKG QRS DURATION: 100 MS
EKG QTC CALCULATION (BAZETT): 487 MS
EKG R AXIS: -15 DEGREES
EKG T AXIS: 53 DEGREES
EKG VENTRICULAR RATE: 78 BPM
EOSINOPHIL # BLD: 0.2 K/UL (ref 0–0.6)
EOSINOPHIL NFR BLD: 3 %
GFR SERPLBLD CREATININE-BSD FMLA CKD-EPI: 70 ML/MIN/{1.73_M2}
GLUCOSE BLD-MCNC: 112 MG/DL (ref 70–99)
GLUCOSE BLD-MCNC: 126 MG/DL (ref 70–99)
GLUCOSE BLD-MCNC: 146 MG/DL (ref 70–99)
GLUCOSE BLD-MCNC: 161 MG/DL (ref 70–99)
GLUCOSE BLD-MCNC: 169 MG/DL (ref 70–99)
GLUCOSE SERPL-MCNC: 164 MG/DL (ref 70–99)
HCT VFR BLD AUTO: 35.1 % (ref 36–48)
HGB BLD-MCNC: 11.7 G/DL (ref 12–16)
LYMPHOCYTES # BLD: 2.6 K/UL (ref 1–5.1)
LYMPHOCYTES NFR BLD: 48.7 %
MCH RBC QN AUTO: 27.9 PG (ref 26–34)
MCHC RBC AUTO-ENTMCNC: 33.2 G/DL (ref 31–36)
MCV RBC AUTO: 84.2 FL (ref 80–100)
MONOCYTES # BLD: 0.4 K/UL (ref 0–1.3)
MONOCYTES NFR BLD: 7.9 %
NEUTROPHILS # BLD: 2.2 K/UL (ref 1.7–7.7)
NEUTROPHILS NFR BLD: 40 %
PERFORMED ON: ABNORMAL
PLATELET # BLD AUTO: 278 K/UL (ref 135–450)
PMV BLD AUTO: 7.7 FL (ref 5–10.5)
POTASSIUM SERPL-SCNC: 4.1 MMOL/L (ref 3.5–5.1)
RBC # BLD AUTO: 4.18 M/UL (ref 4–5.2)
SODIUM SERPL-SCNC: 142 MMOL/L (ref 136–145)
WBC # BLD AUTO: 5.4 K/UL (ref 4–11)

## 2025-01-31 PROCEDURE — 85025 COMPLETE CBC W/AUTO DIFF WBC: CPT

## 2025-01-31 PROCEDURE — 36415 COLL VENOUS BLD VENIPUNCTURE: CPT

## 2025-01-31 PROCEDURE — 2500000003 HC RX 250 WO HCPCS: Performed by: INTERNAL MEDICINE

## 2025-01-31 PROCEDURE — 80048 BASIC METABOLIC PNL TOTAL CA: CPT

## 2025-01-31 PROCEDURE — G0378 HOSPITAL OBSERVATION PER HR: HCPCS

## 2025-01-31 PROCEDURE — 93010 ELECTROCARDIOGRAM REPORT: CPT | Performed by: INTERNAL MEDICINE

## 2025-01-31 PROCEDURE — 99233 SBSQ HOSP IP/OBS HIGH 50: CPT | Performed by: NURSE PRACTITIONER

## 2025-01-31 PROCEDURE — 6360000002 HC RX W HCPCS: Performed by: INTERNAL MEDICINE

## 2025-01-31 PROCEDURE — 96372 THER/PROPH/DIAG INJ SC/IM: CPT

## 2025-01-31 PROCEDURE — 6370000000 HC RX 637 (ALT 250 FOR IP): Performed by: INTERNAL MEDICINE

## 2025-01-31 RX ORDER — HYDRALAZINE HYDROCHLORIDE 25 MG/1
50 TABLET, FILM COATED ORAL EVERY 8 HOURS SCHEDULED
Status: DISCONTINUED | OUTPATIENT
Start: 2025-01-31 | End: 2025-02-01 | Stop reason: HOSPADM

## 2025-01-31 RX ADMIN — INSULIN GLARGINE 52 UNITS: 100 INJECTION, SOLUTION SUBCUTANEOUS at 08:57

## 2025-01-31 RX ADMIN — METOPROLOL SUCCINATE 50 MG: 50 TABLET, EXTENDED RELEASE ORAL at 08:57

## 2025-01-31 RX ADMIN — METOPROLOL SUCCINATE 50 MG: 50 TABLET, EXTENDED RELEASE ORAL at 20:53

## 2025-01-31 RX ADMIN — ASPIRIN 81 MG: 81 TABLET, CHEWABLE ORAL at 08:56

## 2025-01-31 RX ADMIN — ISOSORBIDE MONONITRATE 30 MG: 30 TABLET, EXTENDED RELEASE ORAL at 08:57

## 2025-01-31 RX ADMIN — CLOPIDOGREL BISULFATE 75 MG: 75 TABLET ORAL at 08:57

## 2025-01-31 RX ADMIN — OXYCODONE AND ACETAMINOPHEN 1 TABLET: 10; 325 TABLET ORAL at 16:03

## 2025-01-31 RX ADMIN — ENOXAPARIN SODIUM 30 MG: 100 INJECTION SUBCUTANEOUS at 08:57

## 2025-01-31 RX ADMIN — HYDRALAZINE HYDROCHLORIDE 50 MG: 25 TABLET ORAL at 12:33

## 2025-01-31 RX ADMIN — HYDRALAZINE HYDROCHLORIDE 50 MG: 25 TABLET ORAL at 22:25

## 2025-01-31 RX ADMIN — AMLODIPINE BESYLATE 10 MG: 5 TABLET ORAL at 08:56

## 2025-01-31 RX ADMIN — PREGABALIN 200 MG: 100 CAPSULE ORAL at 20:53

## 2025-01-31 RX ADMIN — OXYCODONE AND ACETAMINOPHEN 1 TABLET: 10; 325 TABLET ORAL at 09:02

## 2025-01-31 RX ADMIN — SODIUM CHLORIDE, PRESERVATIVE FREE 10 ML: 5 INJECTION INTRAVENOUS at 08:58

## 2025-01-31 RX ADMIN — LIDOCAINE HYDROCHLORIDE: 20 SOLUTION ORAL at 14:39

## 2025-01-31 RX ADMIN — OXYCODONE AND ACETAMINOPHEN 1 TABLET: 10; 325 TABLET ORAL at 00:37

## 2025-01-31 RX ADMIN — SODIUM CHLORIDE, PRESERVATIVE FREE 10 ML: 5 INJECTION INTRAVENOUS at 20:53

## 2025-01-31 RX ADMIN — EMPAGLIFLOZIN 10 MG: 10 TABLET, FILM COATED ORAL at 08:57

## 2025-01-31 RX ADMIN — ATORVASTATIN CALCIUM 80 MG: 80 TABLET, FILM COATED ORAL at 20:54

## 2025-01-31 RX ADMIN — ENOXAPARIN SODIUM 30 MG: 100 INJECTION SUBCUTANEOUS at 20:53

## 2025-01-31 ASSESSMENT — PAIN DESCRIPTION - LOCATION
LOCATION: KNEE

## 2025-01-31 ASSESSMENT — PAIN SCALES - GENERAL
PAINLEVEL_OUTOF10: 8
PAINLEVEL_OUTOF10: 8
PAINLEVEL_OUTOF10: 2
PAINLEVEL_OUTOF10: 8

## 2025-01-31 ASSESSMENT — PAIN DESCRIPTION - DESCRIPTORS
DESCRIPTORS: SHARP
DESCRIPTORS: ACHING
DESCRIPTORS: ACHING

## 2025-01-31 ASSESSMENT — PAIN DESCRIPTION - ORIENTATION
ORIENTATION: RIGHT;LEFT

## 2025-01-31 NOTE — PROGRESS NOTES
[facility]   Cardiology Progress Note     Date: 1/31/2025  Admit Date: 1/30/2025     Reason for consultation:     Chief Complaint   Patient presents with    Arm Pain     Pt with right arm pain, upper arm, not worse with movement, hx of mi and felt similar, sent by PCP, has had to use inhaler more as well       History of Present Illness: History obtained from patient and medical record.     Belkis Morton is a 64 y.o. female PMHx CAD s/p LEIDY to LAD 1/2024, DM, HTN who presented to the hospital with complaints of right arm pain     Patient reports paroxysms of R arm pain similar to the few days prior to her LAD stent placement 1/2024 however at that time she eventually developed chest discomfort prior to presentation of which she has had none. No obvious trigger. However patient is predominantly sedentary. BP at presentation 190/100. She recently stopped her amlodipine due to ?gum side effects.   (per consult note)    Interval Hx: Today, she is feeling ok. No chest pain. Right arm pain resolved. Tele stable.      Patient seen and examined. Clinical notes reviewed. Telemetry reviewed.  No new complaints today. No major events overnight.   Denies having chest pain, palpitations, shortness of breath, orthopnea/PND, cough, or dizziness at the time of this visit.      Past Medical History:  Past Medical History:   Diagnosis Date    Anxiety     Arthritis     Asthma     as child    Bilateral chronic knee pain     sees pain management    CAD (coronary artery disease)     Degenerative disc disease, cervical MRI 2011    Multilevel cervical degenerative this disease most significant at the C7-T1 level where there is probable impingement of the exiting right C8 nerve root caused by disc herniation.    Depression     Diabetes     type 2    High blood pressure     Hypertriglyceridemia     Lumbar herniated disc MRI 2011    L5-S1    Morbid obesity     Opiate dependence (HCC)     Osteomyelitis     Pneumonia     as child

## 2025-01-31 NOTE — PROGRESS NOTES
[facility]    1/31/2025 10:16 AM        Name: Belkis Morton .              Admitted: 1/30/2025  Primary Care Provider: ANNE-MARIE Bird MD (Tel: [phone])        Subjective:    No further arm pain today no n.v or chest pian    Reviewed interval ancillary notes    Current Medications  hydrALAZINE (APRESOLINE) tablet 50 mg, 3 times per day  albuterol sulfate HFA (PROVENTIL;VENTOLIN;PROAIR) 108 (90 Base) MCG/ACT inhaler 2 puff, Q6H PRN  aspirin chewable tablet 81 mg, Daily  atorvastatin (LIPITOR) tablet 80 mg, Nightly  clopidogrel (PLAVIX) tablet 75 mg, Daily  empagliflozin (JARDIANCE) tablet 10 mg, Daily  isosorbide mononitrate (IMDUR) extended release tablet 30 mg, Daily  metoprolol succinate (TOPROL XL) extended release tablet 50 mg, BID  oxyCODONE-acetaminophen (PERCOCET)  MG per tablet 1 tablet, TID PRN  pregabalin (LYRICA) capsule 200 mg, Nightly  sodium chloride flush 0.9 % injection 5-40 mL, 2 times per day  sodium chloride flush 0.9 % injection 5-40 mL, PRN  0.9 % sodium chloride infusion, PRN  enoxaparin Sodium (LOVENOX) injection 30 mg, BID  polyethylene glycol (GLYCOLAX) packet 17 g, Daily PRN  acetaminophen (TYLENOL) tablet 650 mg, Q6H PRN   Or  acetaminophen (TYLENOL) suppository 650 mg, Q6H PRN  dextrose bolus 10% 125 mL, PRN   Or  dextrose bolus 10% 250 mL, PRN  dextrose 10 % infusion, Continuous PRN  insulin glargine (LANTUS) injection vial 52 Units, Daily  insulin lispro (HUMALOG,ADMELOG) injection vial 0-8 Units, 4x Daily AC & HS  amLODIPine (NORVASC) tablet 10 mg, Daily        Objective:  BP (!) 184/96   Pulse 82   Temp 97.4 °F (36.3 °C) (Temporal)   Resp 15   Ht 1.676 m (5' 6\")   Wt 133.3 kg (293 lb 14.4 oz)   SpO2 94%   BMI 47.44 kg/m²     Intake/Output Summary (Last 24 hours) at 1/31/2025 1016  Last data filed at 1/31/2025 0856  Gross per 24 hour   Intake 245 ml   Output --   Net

## 2025-01-31 NOTE — CARE COORDINATION
Discharge Planning Assessment:   Patient from home in  Observation with an anticipated short hospitalization length of stay. Chart reviewed and it appears that patient has - None needs at this time. Discussed with patient’s nurse and requested that case management be notified when /if additional discharge needs are identified.     PCP; ANNE-MARIE Bird MD    Insurance;  Medicare    Case management will continue to follow progress and update discharge plan as needed.

## 2025-01-31 NOTE — PLAN OF CARE
Problem: Chronic Conditions and Co-morbidities  Goal: Patient's chronic conditions and co-morbidity symptoms are monitored and maintained or improved  1/31/2025 0913 by Ghulam George RN  Outcome: Progressing  1/30/2025 2023 by Manuela Lou RN  Outcome: Progressing     Problem: Discharge Planning  Goal: Discharge to home or other facility with appropriate resources  1/31/2025 0913 by Ghulam George RN  Outcome: Progressing  1/30/2025 2023 by Manuela Lou RN  Outcome: Progressing     Problem: Safety - Adult  Goal: Free from fall injury  1/31/2025 0913 by Ghulam George RN  Outcome: Progressing  1/30/2025 2023 by Manuela Lou RN  Outcome: Progressing     Problem: Pain  Goal: Verbalizes/displays adequate comfort level or baseline comfort level  1/31/2025 0913 by Ghulam George RN  Outcome: Progressing  1/30/2025 2023 by Manuela Lou RN  Outcome: Progressing     Problem: Cardiovascular - Adult  Goal: Maintains optimal cardiac output and hemodynamic stability  1/31/2025 0913 by Ghulam George RN  Outcome: Progressing  1/30/2025 2023 by Manuela Lou RN  Outcome: Progressing  Goal: Absence of cardiac dysrhythmias or at baseline  1/31/2025 0913 by Ghulam George RN  Outcome: Progressing  1/30/2025 2023 by Manuela Lou RN  Outcome: Progressing     Problem: Respiratory - Adult  Goal: Achieves optimal ventilation and oxygenation  Outcome: Progressing     Problem: Musculoskeletal - Adult  Goal: Return mobility to safest level of function  Outcome: Progressing

## 2025-01-31 NOTE — PLAN OF CARE
Problem: Chronic Conditions and Co-morbidities  Goal: Patient's chronic conditions and co-morbidity symptoms are monitored and maintained or improved  Outcome: Progressing     Problem: Discharge Planning  Goal: Discharge to home or other facility with appropriate resources  Outcome: Progressing     Problem: Safety - Adult  Goal: Free from fall injury  Outcome: Progressing     Problem: Pain  Goal: Verbalizes/displays adequate comfort level or baseline comfort level  Outcome: Progressing     Problem: Cardiovascular - Adult  Goal: Maintains optimal cardiac output and hemodynamic stability  Outcome: Progressing  Goal: Absence of cardiac dysrhythmias or at baseline  Outcome: Progressing

## 2025-02-01 VITALS
HEART RATE: 77 BPM | TEMPERATURE: 97.3 F | HEIGHT: 66 IN | WEIGHT: 293 LBS | RESPIRATION RATE: 16 BRPM | SYSTOLIC BLOOD PRESSURE: 173 MMHG | OXYGEN SATURATION: 96 % | DIASTOLIC BLOOD PRESSURE: 76 MMHG | BODY MASS INDEX: 47.09 KG/M2

## 2025-02-01 PROBLEM — M79.601 RIGHT ARM PAIN: Status: ACTIVE | Noted: 2025-02-01

## 2025-02-01 PROBLEM — Z95.5 HX OF HEART ARTERY STENT: Status: ACTIVE | Noted: 2025-02-01

## 2025-02-01 LAB
ANION GAP SERPL CALCULATED.3IONS-SCNC: 9 MMOL/L (ref 3–16)
BASOPHILS # BLD: 0 K/UL (ref 0–0.2)
BASOPHILS NFR BLD: 0.5 %
BUN SERPL-MCNC: 13 MG/DL (ref 7–20)
CALCIUM SERPL-MCNC: 8.9 MG/DL (ref 8.3–10.6)
CHLORIDE SERPL-SCNC: 104 MMOL/L (ref 99–110)
CO2 SERPL-SCNC: 27 MMOL/L (ref 21–32)
CREAT SERPL-MCNC: 0.9 MG/DL (ref 0.6–1.2)
DEPRECATED RDW RBC AUTO: 16.3 % (ref 12.4–15.4)
EOSINOPHIL # BLD: 0.2 K/UL (ref 0–0.6)
EOSINOPHIL NFR BLD: 3.1 %
GFR SERPLBLD CREATININE-BSD FMLA CKD-EPI: 70 ML/MIN/{1.73_M2}
GLUCOSE BLD-MCNC: 120 MG/DL (ref 70–99)
GLUCOSE BLD-MCNC: 140 MG/DL (ref 70–99)
GLUCOSE SERPL-MCNC: 128 MG/DL (ref 70–99)
HCT VFR BLD AUTO: 33.1 % (ref 36–48)
HGB BLD-MCNC: 11.4 G/DL (ref 12–16)
LYMPHOCYTES # BLD: 2.4 K/UL (ref 1–5.1)
LYMPHOCYTES NFR BLD: 44 %
MAGNESIUM SERPL-MCNC: 1.9 MG/DL (ref 1.8–2.4)
MCH RBC QN AUTO: 28.3 PG (ref 26–34)
MCHC RBC AUTO-ENTMCNC: 34.4 G/DL (ref 31–36)
MCV RBC AUTO: 82.4 FL (ref 80–100)
MONOCYTES # BLD: 0.4 K/UL (ref 0–1.3)
MONOCYTES NFR BLD: 7.9 %
NEUTROPHILS # BLD: 2.4 K/UL (ref 1.7–7.7)
NEUTROPHILS NFR BLD: 44.5 %
PERFORMED ON: ABNORMAL
PERFORMED ON: ABNORMAL
PLATELET # BLD AUTO: 286 K/UL (ref 135–450)
PMV BLD AUTO: 7.7 FL (ref 5–10.5)
POTASSIUM SERPL-SCNC: 3.4 MMOL/L (ref 3.5–5.1)
RBC # BLD AUTO: 4.02 M/UL (ref 4–5.2)
SODIUM SERPL-SCNC: 140 MMOL/L (ref 136–145)
WBC # BLD AUTO: 5.4 K/UL (ref 4–11)

## 2025-02-01 PROCEDURE — 80048 BASIC METABOLIC PNL TOTAL CA: CPT

## 2025-02-01 PROCEDURE — 85025 COMPLETE CBC W/AUTO DIFF WBC: CPT

## 2025-02-01 PROCEDURE — 6360000002 HC RX W HCPCS: Performed by: INTERNAL MEDICINE

## 2025-02-01 PROCEDURE — 99232 SBSQ HOSP IP/OBS MODERATE 35: CPT | Performed by: NURSE PRACTITIONER

## 2025-02-01 PROCEDURE — 6370000000 HC RX 637 (ALT 250 FOR IP): Performed by: INTERNAL MEDICINE

## 2025-02-01 PROCEDURE — 2500000003 HC RX 250 WO HCPCS: Performed by: INTERNAL MEDICINE

## 2025-02-01 PROCEDURE — 96372 THER/PROPH/DIAG INJ SC/IM: CPT

## 2025-02-01 PROCEDURE — G0378 HOSPITAL OBSERVATION PER HR: HCPCS

## 2025-02-01 PROCEDURE — 36415 COLL VENOUS BLD VENIPUNCTURE: CPT

## 2025-02-01 PROCEDURE — 2060000000 HC ICU INTERMEDIATE R&B

## 2025-02-01 PROCEDURE — 83735 ASSAY OF MAGNESIUM: CPT

## 2025-02-01 RX ORDER — NIFEDIPINE 60 MG/1
60 TABLET, EXTENDED RELEASE ORAL DAILY
Qty: 90 TABLET | Refills: 1 | Status: SHIPPED | OUTPATIENT
Start: 2025-02-01

## 2025-02-01 RX ORDER — HYDRALAZINE HYDROCHLORIDE 50 MG/1
50 TABLET, FILM COATED ORAL EVERY 8 HOURS SCHEDULED
Qty: 90 TABLET | Refills: 3 | Status: SHIPPED | OUTPATIENT
Start: 2025-02-01

## 2025-02-01 RX ADMIN — HYDRALAZINE HYDROCHLORIDE 50 MG: 25 TABLET ORAL at 05:18

## 2025-02-01 RX ADMIN — ENOXAPARIN SODIUM 30 MG: 100 INJECTION SUBCUTANEOUS at 09:23

## 2025-02-01 RX ADMIN — HYDRALAZINE HYDROCHLORIDE 50 MG: 25 TABLET ORAL at 14:10

## 2025-02-01 RX ADMIN — METOPROLOL SUCCINATE 50 MG: 50 TABLET, EXTENDED RELEASE ORAL at 09:24

## 2025-02-01 RX ADMIN — CLOPIDOGREL BISULFATE 75 MG: 75 TABLET ORAL at 09:24

## 2025-02-01 RX ADMIN — ASPIRIN 81 MG: 81 TABLET, CHEWABLE ORAL at 09:24

## 2025-02-01 RX ADMIN — SODIUM CHLORIDE, PRESERVATIVE FREE 10 ML: 5 INJECTION INTRAVENOUS at 09:25

## 2025-02-01 RX ADMIN — EMPAGLIFLOZIN 10 MG: 10 TABLET, FILM COATED ORAL at 09:24

## 2025-02-01 RX ADMIN — OXYCODONE AND ACETAMINOPHEN 1 TABLET: 10; 325 TABLET ORAL at 05:17

## 2025-02-01 RX ADMIN — ISOSORBIDE MONONITRATE 30 MG: 30 TABLET, EXTENDED RELEASE ORAL at 09:24

## 2025-02-01 RX ADMIN — INSULIN GLARGINE 52 UNITS: 100 INJECTION, SOLUTION SUBCUTANEOUS at 09:23

## 2025-02-01 RX ADMIN — AMLODIPINE BESYLATE 10 MG: 5 TABLET ORAL at 09:24

## 2025-02-01 ASSESSMENT — PAIN DESCRIPTION - ORIENTATION: ORIENTATION: RIGHT;LEFT

## 2025-02-01 ASSESSMENT — PAIN SCALES - GENERAL
PAINLEVEL_OUTOF10: 8
PAINLEVEL_OUTOF10: 0
PAINLEVEL_OUTOF10: 0

## 2025-02-01 ASSESSMENT — PAIN DESCRIPTION - LOCATION: LOCATION: KNEE

## 2025-02-01 ASSESSMENT — PAIN DESCRIPTION - DESCRIPTORS: DESCRIPTORS: ACHING

## 2025-02-01 NOTE — DISCHARGE SUMMARY
Hospital Medicine Discharge Summary    Patient: Belkis Morton     Gender: female  : 1960   Age: 64 y.o.  MRN: [id number]    Admitting Physician: Savage Louie MD  Discharge Physician: Savage Louie MD     Code Status: Prior     Admit Date: 2025   Discharge Date: 2025      Disposition:  Home  Time spent arranging discharge: 31 minutes    Discharge Diagnoses:    Active Hospital Problems    Diagnosis Date Noted    Right arm pain [M79.601] 2025    Hx of heart artery stent [Z95.5] 2025    Chest pain [R07.9] 2017           Condition at Discharge:  Stable    Hospital Course:   Patient admitted to the hospital atypical chest pain likely secondary to hypertensive urgency blood pressure meds were titrated up and patient had echo performed which showed concentric hypertrophy no wall motion abnormality no further workup per cardiology Plavix was discontinued by cardiology patient will follow-up with PCP and cardiology for further titration of meds    Discharge Exam:    BP (!) 173/76   Pulse 77   Temp 97.3 °F (36.3 °C)   Resp 16   Ht 1.676 m (5' 6\")   Wt 134.9 kg (297 lb 6.4 oz)   SpO2 96%   BMI 48.00 kg/m²   General appearance:  Appears comfortable. AAOx3  HEENT: atraumatic, Pupils equal, muscous membranes moist, no masses appreciated  Cardiovascular: Regular rate and rhythm no murmurs appreciated  Respiratory: CTAB no wheezing  Gastrointestinal: Abdomen soft, non-tender, BS+  EXT: no edema  Neurology: no gross focal deficts  Psychiatry: Appropriate affect. Not agitated  Skin: Warm, dry, no rashes appreciated    Discharge Medications:   Discharge Medication List as of 2025  1:50 PM        START taking these medications    Details   NIFEdipine (PROCARDIA XL) 60 MG extended release tablet Take 1 tablet by mouth daily, Disp-90 tablet, R-1Normal      hydrALAZINE (APRESOLINE) 50 MG tablet Take 1 tablet by mouth every 8 hours, Disp-90 tablet, R-3Normal

## 2025-02-01 NOTE — PLAN OF CARE
Problem: Chronic Conditions and Co-morbidities  Goal: Patient's chronic conditions and co-morbidity symptoms are monitored and maintained or improved  1/31/2025 2121 by Manuela Lou RN  Outcome: Progressing  1/31/2025 0913 by Ghulam George RN  Outcome: Progressing     Problem: Discharge Planning  Goal: Discharge to home or other facility with appropriate resources  1/31/2025 2121 by Manuela Lou RN  Outcome: Progressing  1/31/2025 0913 by Ghulam George RN  Outcome: Progressing     Problem: Safety - Adult  Goal: Free from fall injury  1/31/2025 2121 by Manuela Lou RN  Outcome: Progressing  1/31/2025 0913 by Ghulam George RN  Outcome: Progressing     Problem: Pain  Goal: Verbalizes/displays adequate comfort level or baseline comfort level  1/31/2025 2121 by Manuela Lou RN  Outcome: Progressing  1/31/2025 0913 by Ghulam George RN  Outcome: Progressing     Problem: Cardiovascular - Adult  Goal: Maintains optimal cardiac output and hemodynamic stability  1/31/2025 2121 by Manuela Lou RN  Outcome: Progressing  1/31/2025 0913 by Ghulam George RN  Outcome: Progressing  Goal: Absence of cardiac dysrhythmias or at baseline  1/31/2025 2121 by Manuela Lou RN  Outcome: Progressing  1/31/2025 0913 by Ghulam George RN  Outcome: Progressing     Problem: Respiratory - Adult  Goal: Achieves optimal ventilation and oxygenation  1/31/2025 2121 by Manuela Lou RN  Outcome: Progressing  1/31/2025 0913 by Ghulam George RN  Outcome: Progressing     Problem: Musculoskeletal - Adult  Goal: Return mobility to safest level of function  1/31/2025 2121 by Manuela Lou RN  Outcome: Progressing  1/31/2025 0913 by Ghulam George RN  Outcome: Progressing

## 2025-02-01 NOTE — PROGRESS NOTES
[facility]  DAILY PROGRESS NOTE    Admit Date: 1/30/2025       Chief Complaint: arm pain     Interval History:   Patient seen and examined and notes reviewed. Patient is being followed for CAD, HTN. Today she feels well, still has arm pain but denies any CP, palpitations or SOB.     In: 255 [P.O.:240; I.V.:15]  Out: -    Wt Readings from Last 2 Encounters:   02/01/25 134.9 kg (297 lb 6.4 oz)   01/30/25 134.4 kg (296 lb 3.2 oz)         Data:   Scheduled Meds:   Scheduled Meds:   hydrALAZINE  50 mg Oral 3 times per day    aspirin  81 mg Oral Daily    atorvastatin  80 mg Oral Nightly    clopidogrel  75 mg Oral Daily    empagliflozin  10 mg Oral Daily    isosorbide mononitrate  30 mg Oral Daily    metoprolol succinate  50 mg Oral BID    pregabalin  200 mg Oral Nightly    sodium chloride flush  5-40 mL IntraVENous 2 times per day    enoxaparin  30 mg SubCUTAneous BID    insulin glargine  52 Units SubCUTAneous Daily    insulin lispro  0-8 Units SubCUTAneous 4x Daily AC & HS    amLODIPine  10 mg Oral Daily     Continuous Infusions:   sodium chloride      dextrose       PRN Meds:.albuterol sulfate HFA, oxyCODONE-acetaminophen, sodium chloride flush, sodium chloride, polyethylene glycol, acetaminophen **OR** acetaminophen, dextrose bolus **OR** dextrose bolus, dextrose  Continuous Infusions:   sodium chloride      dextrose         Intake/Output Summary (Last 24 hours) at 2/1/2025 1032  Last data filed at 2/1/2025 0925  Gross per 24 hour   Intake 250 ml   Output --   Net 250 ml     Lab Data:  CBC:   Lab Results   Component Value Date/Time    WBC 5.4 02/01/2025 05:27 AM    HGB 11.4 02/01/2025 05:27 AM     02/01/2025 05:27 AM     BMP:  Lab Results   Component Value Date/Time     02/01/2025 05:27 AM    K 3.4 02/01/2025 05:27 AM     02/01/2025 05:27 AM    CO2 27 02/01/2025 05:27 AM    BUN 13 02/01/2025 05:27 AM    CREATININE 0.9 02/01/2025 05:27 AM    GLUCOSE 128 02/01/2025 05:27 AM     INR:   Lab  annulus. Mildly increased gradients noted through the mitral valve (MV   MPG= 5mmHg)        ECHO:  1/30/25    Left Ventricle: Normal left ventricular systolic function with a visually estimated EF of 55 - 60%. EF by 2D Simpsons Biplane is 55%. Left ventricle size is normal. Increased wall thickness. Findings consistent with concentric hypertrophy. Normal wall motion. Grade II diastolic dysfunction with increased LAP.    Right Ventricle: Not well visualized. Right ventricle size is normal.    Left Atrium: Left atrium is moderately dilated. Left atrial volume index is moderately increased (42-48 mL/m2).    IVC/SVC: IVC diameter is less than or equal to 21 mm and decreases less than 50% during inspiration; therefore the estimated right atrial pressure is intermediate (~8 mmHg). IVC size is normal.    Image quality is adequate. Contrast used: Lumason.        ANGIOGRAM/CORONARY ARTERIOGRAM:   1/8/24  Left main artery Bifurcates into the left anterior descending artery and left circumflex artery nml   Left anterior descending artery Gives rise to 2 diagonal arteries Mid 99%, distal 50%   Diagonals   nml   Left circumflex artery Non-dominant vessel that gives rise to 2 obtuse marginal arteries nml   Obtuse Marginals   nml      Right coronary artery Dominant vessel that gives rise to the posterior descending artery and posterolateral branch nml   Posterior descending artery Posterior lateral branch   nml  nml         LEFT VENTRICULOGRAM:  Left ventricular angiogram was done in the 30° JAMES projection and revealed  LVEF- 60%  LVG- nml  LVEDP- 20  Aortic pressure- 123/76 There was no gradient between the left ventricle and aorta     INTERVENTION:  Lesion 1, PCI of LAD:  ~Guide catheter: Accessing and selectively catheterizing with the xb 3.5  ~Coronary wire: Traversing the lesion with a desirae blue  ~Additional Equipment: none  ~Pre-dilation Balloon: 3.0x15  ~Drug Eluting Stent: 3.25x28 xience  ~Post-dilation Balloon:

## 2025-02-01 NOTE — DISCHARGE INSTRUCTIONS
Mechanism of Action:   CCBs work by blocking calcium channels, which are proteins that allow calcium ions to flow into cells. This interruption in calcium influx leads to:   Relaxation of blood vessels, lowering blood pressure  Slowing of the heart rate  Reduction in the force of heart contractions

## 2025-02-02 NOTE — PROGRESS NOTES
Data- discharge order received, pt verbalized agreement to discharge, disposition to previous residence, no needs for HHC/DME.     Action- discharge instructions prepared and given to pt, pt verbalized understanding. Medication information packet given r/t NEW and/or CHANGED prescriptions emphasizing name/purpose/side effects, pt verbalized understanding. Discharge instruction summary: Diet- regular, Activity- up as tolerated, Primary Care Physician as follows: ANNE-MARIE Bird -[phone] f/u appointment in 1 week, immunizations reviewed and updated, prescription medications filled at pharmacy. CHF Education reviewed. Pt/ Family has had a total of 60 minutes CHF education this admission encounter.     1. WEIGHT: Admit Weight - Scale: 134.1 kg (295 lb 9.6 oz) (01/30/25 1106)        Today  Weight - Scale: 134.9 kg (297 lb 6.4 oz) (02/01/25 0215)       2. O2 SAT.: SpO2: 96 % (02/01/25 1145)    Response- Pt belongings gathered, IV removed. Disposition is home (no HHC/DME needs), transported with daughter, taken to lobby via w/c w/ RN, no complications.

## 2025-02-02 NOTE — PLAN OF CARE
Problem: Chronic Conditions and Co-morbidities  Goal: Patient's chronic conditions and co-morbidity symptoms are monitored and maintained or improved  2/1/2025 1431 by Jaymie Porter RN  Outcome: Adequate for Discharge     Problem: Discharge Planning  Goal: Discharge to home or other facility with appropriate resources  2/1/2025 1431 by Jaymie Porter RN  Outcome: Adequate for Discharge     Problem: Safety - Adult  Goal: Free from fall injury  2/1/2025 1431 by Jaymie Porter RN  Outcome: Adequate for Discharge     Problem: Pain  Goal: Verbalizes/displays adequate comfort level or baseline comfort level  2/1/2025 1431 by Jaymie Porter RN  Outcome: Adequate for Discharge     Problem: Cardiovascular - Adult  Goal: Maintains optimal cardiac output and hemodynamic stability  2/1/2025 1431 by Jaymie Porter RN  Outcome: Adequate for Discharge  Goal: Absence of cardiac dysrhythmias or at baseline  2/1/2025 1431 by Jaymie Porter RN  Outcome: Adequate for Discharge     Problem: Respiratory - Adult  Goal: Achieves optimal ventilation and oxygenation  2/1/2025 1431 by Jaymie Porter RN  Outcome: Adequate for Discharge     Problem: Musculoskeletal - Adult  Goal: Return mobility to safest level of function  2/1/2025 1431 by Jaymie Porter RN  Outcome: Adequate for Discharge

## 2025-02-03 ENCOUNTER — TELEPHONE (OUTPATIENT)
Dept: INTERNAL MEDICINE CLINIC | Age: 65
End: 2025-02-03

## 2025-02-03 NOTE — TELEPHONE ENCOUNTER
Care Transitions Initial Follow Up Call    Outreach made within 2 business days of discharge: Yes    Patient: Belkis Morton Patient : 1960   MRN: [id number]  Reason for Admission: Right Arm pain  Discharge Date: 25       Spoke with: Pt    Discharge department/facility: [facility]    TCM Interactive Patient Contact:  Was patient able to fill all prescriptions: Yes  Was patient instructed to bring all medications to the follow-up visit: Yes  Is patient taking all medications as directed in the discharge summary? Yes  Does patient understand their discharge instructions: Yes  Does patient have questions or concerns that need addressed prior to 7-14 day follow up office visit: no    Additional needs identified to be addressed with provider               Scheduled appointment with PCP within 7-14 days    Follow Up  Future Appointments   Date Time Provider Department Center   2025  3:15 PM Jessica Justice DO FF Cardio [facility]   3/11/2025 10:30 AM Herman Pacheco MD HEALTHY WT [facility]   2025  2:00 PM Paola Magallanes MD FF ENDO [facility]       AMANDA MEJIA MA

## 2025-02-07 PROBLEM — E78.5 HLD (HYPERLIPIDEMIA): Status: ACTIVE | Noted: 2025-02-07

## 2025-02-07 PROBLEM — Z09 HOSPITAL DISCHARGE FOLLOW-UP: Status: ACTIVE | Noted: 2025-02-07

## 2025-02-07 NOTE — ASSESSMENT & PLAN NOTE
[facility] 1/8/2024  PCI of the LAD  No anginal symptoms  Statin, aspirin, BB

## 2025-02-07 NOTE — ASSESSMENT & PLAN NOTE
Patient reports paroxysms of R arm pain similar to the few days prior to her LAD stent placement 1/2024 however at that time she eventually developed chest discomfort prior to presentation of which she has had none. No obvious trigger.   Recent LHC , trops negative, EKG unremarkable.

## 2025-02-07 NOTE — ASSESSMENT & PLAN NOTE
Lab Results   Component Value Date    CHOL 146 06/13/2024    TRIG 107 06/13/2024    HDL 64 (H) 06/13/2024    LDL 61 06/13/2024    VLDL 21 06/13/2024   Lipids reviewed  Continue Lipitor 80mg

## 2025-02-07 NOTE — PROGRESS NOTES
GENERAL CARDIOLOGY        CHIEF COMPLAINT  Hospital discharge follow up          HPI    Belkis Morton is a 64 y.o. female presents for hospital discharge follow up.  Patient was hospitalized on 1/30-2/1/2025 for arm pain.  Cardiology consulted for arm pain, hypertensive urgency.      Medical history reviewed, significant for CAD s/p LEIDY to LAD 1/2024, DM, HTN, and HLD.    Today, the patient reports:  She is feeling ok, better since hospitalization  No recurrent arm pain  No other cardiac symptoms, no chest pain, significant dyspnea, palpitations  Walks with a cane  Has bilateral leg edema    ASSESSMENT AND PLAN  Hospital discharge follow-up  Hospitalized 1/30-2/1/2025 for right arm pain  Medical records reviewed, summarized with the patient  Symptoms have not returned      CAD (coronary artery disease)  [facility] 1/8/2024  PCI of the LAD  No anginal symptoms  Statin, aspirin, BB    DM (diabetes mellitus) ([facility])  A1c 8.0 (9/2024), Repeated 1/2025; 6.2  Marked improvement in A1C  Maintained on Semaglutide, Jardiance, insulin      Essential hypertension  Bp today is 128/64  Discussed AM and PM dosing:  AM:  Nifedipine, Hydralazine, increase Lasix to 40 mg   PM:  Hydralazine, Toprol XL  Advised monitoring daily weight and BP  Sleep apnea workup    HLD (hyperlipidemia)  Lab Results   Component Value Date    CHOL 146 06/13/2024    TRIG 107 06/13/2024    HDL 64 (H) 06/13/2024    LDL 61 06/13/2024    VLDL 21 06/13/2024   Lipids reviewed  Continue Lipitor 80mg    Chronic heart failure with preserved ejection fraction (HFpEF) ([facility])  Bilateral leg edema on exam  Recommend increasing Lasix to 40 mg daily  Daily weight, BP readings  BMP 3 weeks      Suspected sleep apnea  Suspected sleep apnea  Patient states she already has a referral   Encouraged her to set appointment      Medications Discontinued During This Encounter   Medication Reason    isosorbide mononitrate (IMDUR) 30 MG extended release tablet Therapy completed

## 2025-02-07 NOTE — ASSESSMENT & PLAN NOTE
A1c 8.0 (9/2024), Repeated 1/2025; 6.2  Marked improvement in A1C  Maintained on Semaglutide, Jardiance, insulin

## 2025-02-07 NOTE — ASSESSMENT & PLAN NOTE
Hospitalized 1/30-2/1/2025 for right arm pain  Medical records reviewed, summarized with the patient  Symptoms have not returned

## 2025-02-07 NOTE — ASSESSMENT & PLAN NOTE
Bp today is 128/64  Discussed AM and PM dosing:  AM:  Nifedipine, Hydralazine, increase Lasix to 40 mg   PM:  Hydralazine, Toprol XL  Advised monitoring daily weight and BP  Sleep apnea workup

## 2025-02-12 ENCOUNTER — PATIENT MESSAGE (OUTPATIENT)
Dept: INTERNAL MEDICINE CLINIC | Age: 65
End: 2025-02-12

## 2025-02-21 ENCOUNTER — OFFICE VISIT (OUTPATIENT)
Dept: CARDIOLOGY CLINIC | Age: 65
End: 2025-02-21

## 2025-02-21 VITALS
OXYGEN SATURATION: 97 % | SYSTOLIC BLOOD PRESSURE: 128 MMHG | WEIGHT: 293 LBS | DIASTOLIC BLOOD PRESSURE: 64 MMHG | HEIGHT: 66 IN | HEART RATE: 96 BPM | BODY MASS INDEX: 47.09 KG/M2

## 2025-02-21 DIAGNOSIS — R29.818 SUSPECTED SLEEP APNEA: ICD-10-CM

## 2025-02-21 DIAGNOSIS — I25.10 CORONARY ARTERY DISEASE INVOLVING NATIVE CORONARY ARTERY OF NATIVE HEART WITHOUT ANGINA PECTORIS: ICD-10-CM

## 2025-02-21 DIAGNOSIS — I10 ESSENTIAL HYPERTENSION: ICD-10-CM

## 2025-02-21 DIAGNOSIS — M79.601 RIGHT ARM PAIN: ICD-10-CM

## 2025-02-21 DIAGNOSIS — E78.2 MIXED HYPERLIPIDEMIA: ICD-10-CM

## 2025-02-21 DIAGNOSIS — Z79.4 DIABETES MELLITUS DUE TO UNDERLYING CONDITION WITH DIABETIC NEPHROPATHY, WITH LONG-TERM CURRENT USE OF INSULIN (HCC): ICD-10-CM

## 2025-02-21 DIAGNOSIS — Z09 HOSPITAL DISCHARGE FOLLOW-UP: Primary | ICD-10-CM

## 2025-02-21 DIAGNOSIS — I50.32 CHRONIC HEART FAILURE WITH PRESERVED EJECTION FRACTION (HFPEF) (HCC): ICD-10-CM

## 2025-02-21 DIAGNOSIS — I50.22 CHRONIC SYSTOLIC (CONGESTIVE) HEART FAILURE (HCC): ICD-10-CM

## 2025-02-21 DIAGNOSIS — E08.21 DIABETES MELLITUS DUE TO UNDERLYING CONDITION WITH DIABETIC NEPHROPATHY, WITH LONG-TERM CURRENT USE OF INSULIN (HCC): ICD-10-CM

## 2025-02-21 RX ORDER — HYDRALAZINE HYDROCHLORIDE 50 MG/1
50 TABLET, FILM COATED ORAL 2 TIMES DAILY
Qty: 90 TABLET | Refills: 3 | Status: SHIPPED | OUTPATIENT
Start: 2025-02-21

## 2025-02-21 RX ORDER — METOPROLOL TARTRATE 100 MG/1
100 TABLET ORAL DAILY
Qty: 90 TABLET | Refills: 1 | Status: SHIPPED | OUTPATIENT
Start: 2025-02-21

## 2025-02-21 RX ORDER — ASPIRIN 81 MG/1
81 TABLET, CHEWABLE ORAL DAILY
Qty: 30 TABLET | Refills: 5 | Status: SHIPPED | OUTPATIENT
Start: 2025-02-21

## 2025-02-21 RX ORDER — FUROSEMIDE 40 MG/1
40 TABLET ORAL DAILY
Qty: 90 TABLET | Refills: 1 | Status: SHIPPED | OUTPATIENT
Start: 2025-02-21

## 2025-02-21 NOTE — PATIENT INSTRUCTIONS
Lasix 40 mg take in the am    Hydralazine 50mg twice per day, once in the morning and once in the evening    Nifedipine 60 mg daily , take in am    Metoprolol 100mg in the evening    Lab work in 3 weeks    Sleep medicine referral    Follow up in 4 weeks

## 2025-02-21 NOTE — ASSESSMENT & PLAN NOTE
Suspected sleep apnea  Patient states she already has a referral   Encouraged her to set appointment

## 2025-02-21 NOTE — ASSESSMENT & PLAN NOTE
Bilateral leg edema on exam  Recommend increasing Lasix to 40 mg daily  Daily weight, BP readings  BMP 3 weeks

## 2025-03-03 ENCOUNTER — TELEPHONE (OUTPATIENT)
Dept: INTERNAL MEDICINE CLINIC | Age: 65
End: 2025-03-03

## 2025-03-03 RX ORDER — INSULIN GLARGINE 100 [IU]/ML
52 INJECTION, SOLUTION SUBCUTANEOUS NIGHTLY
Qty: 5 ADJUSTABLE DOSE PRE-FILLED PEN SYRINGE | Refills: 2 | Status: SHIPPED | OUTPATIENT
Start: 2025-03-03

## 2025-03-03 NOTE — TELEPHONE ENCOUNTER
Letter received from [employer] stating that patients   insulin glargine (LANTUS SOLOSTAR) 100 UNIT/ML injection pen  is not covered under the formulary. It looks like BASAGLAR INJ is the preferred alternative.

## 2025-03-04 NOTE — PROGRESS NOTES
the skin 3 times daily as needed for High Blood Sugar (sliding scale: add 2 units insulin for every 50 mg/dL > 150 mg/dL; 151-199 add 2 units, 200-249 add 4 units, 250-299 add 6 units, 300-349 add 8 units, 350+ add 10 units) 1/9/24  Yes Melanie Herron MD   blood glucose monitor kit and supplies Dispense sufficient amount for indicated testing frequency plus additional to accommodate PRN testing needs. Dispense all needed supplies to include: monitor, strips, lancing device, lancets, control solutions, alcohol swabs. 1/8/24  Yes Lien Smith MD   Lancets MISC 1 each by Does not apply route 3 times daily 1/8/24  Yes Lien Smith MD   albuterol sulfate HFA (PROAIR HFA) 108 (90 Base) MCG/ACT inhaler Inhale 2 puffs into the lungs every 6 hours as needed for Wheezing 10/28/22  Yes ANNE-MARIE Bird MD   nitroGLYCERIN (NITROSTAT) 0.4 MG SL tablet PLACE 1 TABLET UNDER TONGUE EVERY 5 MINUTES AS NEEDED FOR CHEST PAIN. MAX OF 3 DOSES 10/28/22  Yes ANNE-MARIE Bird MD   oxyCODONE-acetaminophen (PERCOCET)  MG per tablet Take 1 tablet by mouth 3 times daily as needed for Pain. Per pain management at [facility]   Yes ProviderOscar MD   Blood Pressure KIT Use as directed 11/16/20  Yes ANNE-MARIE Bird MD   empagliflozin (JARDIANCE) 10 MG tablet Take 1 tablet by mouth daily  Patient not taking: Reported on 3/18/2025 1/23/25   Paola Magallanes MD   semaglutide, 2 MG/DOSE, (OZEMPIC) 8 MG/3ML SOPN sc injection Inject 2 mg into the skin every 7 days  Patient not taking: Reported on 2/21/2025 6/13/24   ANNE-MARIE Bird MD   ibuprofen (ADVIL;MOTRIN) 600 MG tablet Take 1 tablet by mouth 3 times daily as needed for Pain  Patient not taking: Reported on 3/18/2025 2/20/24   Atilio Sy PA   UNABLE TO FIND Glucometer #1, E11.9, testing TID. ON INSULIN.  Home pt.  DISPENSE METER BEST COVERED BY INSURANCE  Patient not taking: Reported on 3/18/2025 10/13/17   ANNE-MARIE Bird MD        Allergies:  Lisinopril, Zolpidem

## 2025-03-04 NOTE — ASSESSMENT & PLAN NOTE
A1c 8.0 (9/2024), Repeated 1/2025; 6.2  Marked improvement in A1C  Maintained on Semaglutide, insulin  States Jardiance cost prohibitive

## 2025-03-04 NOTE — ASSESSMENT & PLAN NOTE
Bp today is 136/70  Recommend medication adjustment for CHF  Advised monitoring daily weight and BP

## 2025-03-04 NOTE — ASSESSMENT & PLAN NOTE
Echo 1/2025, EF 55-60%  Worsening bilateral leg edema, weight increased by 5 pounds  Recommend replacing Lasix with Torsemide  Replace hydralazine with Entresto (price check sent to pharmacy), denies anaphylaxis with Lisinopril  Advised to monitor for Lip swelling  BP readings reviewed  CMP scheduled with Dr. Betts, asked patient to include me in labs in 3 weeks  Encouraged sleep apnea workup  Encouraged reducing sodium intake  Encouraged ambulation  Suggested ACE wraps

## 2025-03-04 NOTE — ASSESSMENT & PLAN NOTE
[facility] 1/8/2024  PCI of the LAD, no residual disease  Reports chest pain as angina, no recurrence  Statin, aspirin, BB

## 2025-03-09 PROBLEM — Z09 HOSPITAL DISCHARGE FOLLOW-UP: Status: RESOLVED | Noted: 2025-02-07 | Resolved: 2025-03-09

## 2025-03-10 ENCOUNTER — TELEPHONE (OUTPATIENT)
Dept: BARIATRICS/WEIGHT MGMT | Age: 65
End: 2025-03-10

## 2025-03-11 ENCOUNTER — OFFICE VISIT (OUTPATIENT)
Dept: BARIATRICS/WEIGHT MGMT | Age: 65
End: 2025-03-11
Payer: MEDICARE

## 2025-03-11 VITALS
BODY MASS INDEX: 47.09 KG/M2 | HEIGHT: 66 IN | RESPIRATION RATE: 16 BRPM | WEIGHT: 293 LBS | SYSTOLIC BLOOD PRESSURE: 142 MMHG | HEART RATE: 84 BPM | DIASTOLIC BLOOD PRESSURE: 90 MMHG | OXYGEN SATURATION: 96 %

## 2025-03-11 DIAGNOSIS — I10 ESSENTIAL HYPERTENSION: ICD-10-CM

## 2025-03-11 DIAGNOSIS — E78.2 MIXED HYPERLIPIDEMIA: ICD-10-CM

## 2025-03-11 DIAGNOSIS — Z01.818 PREOPERATIVE CLEARANCE: Primary | ICD-10-CM

## 2025-03-11 DIAGNOSIS — E66.9 TYPE 2 DIABETES MELLITUS WITH OBESITY (HCC): ICD-10-CM

## 2025-03-11 DIAGNOSIS — R29.818 SUSPECTED SLEEP APNEA: ICD-10-CM

## 2025-03-11 DIAGNOSIS — K21.9 CHRONIC GERD: ICD-10-CM

## 2025-03-11 DIAGNOSIS — I50.32 CHRONIC HEART FAILURE WITH PRESERVED EJECTION FRACTION (HFPEF) (HCC): ICD-10-CM

## 2025-03-11 DIAGNOSIS — E78.01 FAMILIAL HYPERCHOLESTEROLEMIA: ICD-10-CM

## 2025-03-11 DIAGNOSIS — E11.69 TYPE 2 DIABETES MELLITUS WITH OBESITY (HCC): ICD-10-CM

## 2025-03-11 PROCEDURE — 3077F SYST BP >= 140 MM HG: CPT | Performed by: SURGERY

## 2025-03-11 PROCEDURE — 2022F DILAT RTA XM EVC RTNOPTHY: CPT | Performed by: SURGERY

## 2025-03-11 PROCEDURE — 1159F MED LIST DOCD IN RCRD: CPT | Performed by: SURGERY

## 2025-03-11 PROCEDURE — G8400 PT W/DXA NO RESULTS DOC: HCPCS | Performed by: SURGERY

## 2025-03-11 PROCEDURE — 1036F TOBACCO NON-USER: CPT | Performed by: SURGERY

## 2025-03-11 PROCEDURE — G8417 CALC BMI ABV UP PARAM F/U: HCPCS | Performed by: SURGERY

## 2025-03-11 PROCEDURE — 3080F DIAST BP >= 90 MM HG: CPT | Performed by: SURGERY

## 2025-03-11 PROCEDURE — 99205 OFFICE O/P NEW HI 60 MIN: CPT | Performed by: SURGERY

## 2025-03-11 PROCEDURE — 1090F PRES/ABSN URINE INCON ASSESS: CPT | Performed by: SURGERY

## 2025-03-11 PROCEDURE — 3017F COLORECTAL CA SCREEN DOC REV: CPT | Performed by: SURGERY

## 2025-03-11 PROCEDURE — 1123F ACP DISCUSS/DSCN MKR DOCD: CPT | Performed by: SURGERY

## 2025-03-11 PROCEDURE — 1160F RVW MEDS BY RX/DR IN RCRD: CPT | Performed by: SURGERY

## 2025-03-11 PROCEDURE — G2211 COMPLEX E/M VISIT ADD ON: HCPCS | Performed by: SURGERY

## 2025-03-11 PROCEDURE — 3044F HG A1C LEVEL LT 7.0%: CPT | Performed by: SURGERY

## 2025-03-11 PROCEDURE — G8427 DOCREV CUR MEDS BY ELIG CLIN: HCPCS | Performed by: SURGERY

## 2025-03-11 NOTE — PROGRESS NOTES
[facility]  Herman Pacheco MD, FACS, [place] [address], [address]    [facility] [address] .  Phone: [phone]  Fax: [phone]       Chief Complaint   Patient presents with    Bariatrics Post Op Follow Up     NP Wls Medicare           HPI:    Belkis Morton is a very pleasant 65 y.o. obese female ,   Body mass index is 50.29 kg/m².  And multiple medical problems who is presenting for weight loss surgery evaluation and consultation by ANNE-MARIE Clements.    Patient has been struggling for several years now with obesity. Patient feels the weight is an obstacle to achieve and perform things in daily living as well risk on health.     Tries to diet, and exercise but can't keep the weight off.  Patient tried  Fasting  Patient has not participated in weight loss medications- None.  Patient has not participated in meal replacement/liquid diets- None and other regimens, but with no sustainable weight loss.     Patient  is very determined to lose weight and be healthy, and is interested in surgical weight loss for future weight loss.   .    Otherwise patient denies any nausea, vomiting, fevers, chills, shortness of breath, chest pain, constipation or urinary symptoms.        Obesity related problems Belkis is dealing with:  Patient Active Problem List    Diagnosis Date Noted    Abnormal myocardial perfusion study      Priority: High    Essential hypertension      Priority: High    Abnormal EKG      Priority: High    Preoperative clearance 03/11/2025    Type 2 diabetes mellitus with obesity (HCC) 03/11/2025    Chronic heart failure with preserved ejection fraction (HFpEF) (HCC) 02/21/2025    Suspected sleep apnea 02/21/2025    HLD (hyperlipidemia) 02/07/2025    Hx of heart artery stent 02/01/2025    Morbid obesity with BMI of 50.0-59.9, adult 01/23/2025    CAD (coronary artery disease) 09/26/2024    Moderate episode of recurrent major depressive disorder (HCC)

## 2025-03-11 NOTE — PROGRESS NOTES
Belkis Morton is a 65 y.o. female with a date of birth of 1960.    Vitals:    03/11/25 1122   BP: (!) 142/90   Pulse: 84   Resp: 16   SpO2: 96%    BMI: Body mass index is 50.29 kg/m². Obesity Classification: Class III    Weight History & Health Goals  Weight History:   Wt Readings from Last 3 Encounters:   03/11/25 (!) 141.3 kg (311 lb 9.6 oz)   02/21/25 (!) 138.8 kg (306 lb)   02/01/25 134.9 kg (297 lb 6.4 oz)       Patient's lowest adult weight was 165 lbs at age teens.   Patient's highest adult weight was 315 lbs at age 40s.   Goal Weight: 165  Health improvements patient is hoping to see with bariatric surgery:  reverse DM, HTN    Food Restrictions  Patient does have food allergies/sensitivities. - eggs (can throw up if eating them)  Patient does have lactose intolerance.  Patient does not have Mandaeism/cultural food preferences.   Patient does not have a sensitivity/intolerance to artificial sweeteners.     Previous Diet Attempts  Patient has participated in the following weight loss programs: Fasting  Patient has not participated in weight loss medications- None.  Patient has not participated in meal replacement/liquid diets- None.      Activity & Typical Eating Habits  Physical Activity: none    Pt reports that they often feels hungry before eating, eats large portions, and eats meals quickly (<15 minutes)  Patient unsure is she has a history of stress eating, emotional eating or eating out of boredom.  Patient does have night eating.  Patient unsure if she has grazing.   Patient does not meet the criteria for binge eating disorder.     Beverages throughout the day: water, regular soda, fruit juice, coffee, mixed drinks, hard liquior  Pt does drink alcohol. How often/how much: mixed drinks/hard liquor nightly.    Including all meals & snacks, patient did not reports how many times per day on weekdays she eats. Of these, unsure of how many are prepared at home.     24 Hour Diet Recall/Food

## 2025-03-11 NOTE — PATIENT INSTRUCTIONS
Patient received dietary handouts and education.       -Plan for Laparoscopic sleeve gastrectomy      Pre-operative work up Ordered:    - F/U in 4 weeks.   - Nutrition Labs.   - Protein Shake Trial.  - Psych Evaluation.   - Cardiac Clearance.  - Sleep Study & CPAP Compliance.  - EGD (endoscopy to check your stomach).  - Support Group Attendance.   - Obtain letter of medical necessity (PCP Letter).   - Quit Smoking / Chewing tobacco / Vaping  / medical marijuana, Alcohol, Caffeine and Carbonated Drinks.  - Obtain records for Weight History 2 yrs.   - Start Regular Exercise and track your activities.   - Start Tracking your food Intake and follow dietary guidelines.   - Avoid Pregnancy for 2 yrs from date of surgery. (for female patients in childbearing age).      Patient advised that its their responsibility to follow up for studies, referrals and/or labs ordered today.

## 2025-03-18 ENCOUNTER — OFFICE VISIT (OUTPATIENT)
Dept: CARDIOLOGY CLINIC | Age: 65
End: 2025-03-18
Payer: MEDICARE

## 2025-03-18 ENCOUNTER — TELEPHONE (OUTPATIENT)
Dept: CARDIOLOGY CLINIC | Age: 65
End: 2025-03-18

## 2025-03-18 VITALS
HEART RATE: 76 BPM | DIASTOLIC BLOOD PRESSURE: 70 MMHG | HEIGHT: 66 IN | BODY MASS INDEX: 47.09 KG/M2 | WEIGHT: 293 LBS | OXYGEN SATURATION: 98 % | SYSTOLIC BLOOD PRESSURE: 136 MMHG

## 2025-03-18 DIAGNOSIS — R29.818 SUSPECTED SLEEP APNEA: ICD-10-CM

## 2025-03-18 DIAGNOSIS — I10 ESSENTIAL HYPERTENSION: Primary | ICD-10-CM

## 2025-03-18 DIAGNOSIS — I50.32 CHRONIC HEART FAILURE WITH PRESERVED EJECTION FRACTION (HFPEF) (HCC): ICD-10-CM

## 2025-03-18 DIAGNOSIS — M79.89 LEG SWELLING: ICD-10-CM

## 2025-03-18 DIAGNOSIS — Z01.810 ENCOUNTER FOR PREOPERATIVE ASSESSMENT FOR NONCORONARY CARDIAC SURGERY: ICD-10-CM

## 2025-03-18 DIAGNOSIS — I28.8 DILATED PULMONARY TRUNK (HCC): ICD-10-CM

## 2025-03-18 DIAGNOSIS — I50.22 CHRONIC SYSTOLIC (CONGESTIVE) HEART FAILURE: ICD-10-CM

## 2025-03-18 PROCEDURE — G2211 COMPLEX E/M VISIT ADD ON: HCPCS | Performed by: INTERNAL MEDICINE

## 2025-03-18 PROCEDURE — 3017F COLORECTAL CA SCREEN DOC REV: CPT | Performed by: INTERNAL MEDICINE

## 2025-03-18 PROCEDURE — 1036F TOBACCO NON-USER: CPT | Performed by: INTERNAL MEDICINE

## 2025-03-18 PROCEDURE — 3075F SYST BP GE 130 - 139MM HG: CPT | Performed by: INTERNAL MEDICINE

## 2025-03-18 PROCEDURE — 1090F PRES/ABSN URINE INCON ASSESS: CPT | Performed by: INTERNAL MEDICINE

## 2025-03-18 PROCEDURE — 1123F ACP DISCUSS/DSCN MKR DOCD: CPT | Performed by: INTERNAL MEDICINE

## 2025-03-18 PROCEDURE — 99214 OFFICE O/P EST MOD 30 MIN: CPT | Performed by: INTERNAL MEDICINE

## 2025-03-18 PROCEDURE — 3078F DIAST BP <80 MM HG: CPT | Performed by: INTERNAL MEDICINE

## 2025-03-18 PROCEDURE — G8427 DOCREV CUR MEDS BY ELIG CLIN: HCPCS | Performed by: INTERNAL MEDICINE

## 2025-03-18 PROCEDURE — G8417 CALC BMI ABV UP PARAM F/U: HCPCS | Performed by: INTERNAL MEDICINE

## 2025-03-18 PROCEDURE — 1159F MED LIST DOCD IN RCRD: CPT | Performed by: INTERNAL MEDICINE

## 2025-03-18 PROCEDURE — G8400 PT W/DXA NO RESULTS DOC: HCPCS | Performed by: INTERNAL MEDICINE

## 2025-03-18 RX ORDER — TORSEMIDE 20 MG/1
20 TABLET ORAL 2 TIMES DAILY
Qty: 60 TABLET | Refills: 1 | Status: SHIPPED | OUTPATIENT
Start: 2025-03-18

## 2025-03-18 NOTE — TELEPHONE ENCOUNTER
I called Belkis to inform her that we did do a price check thru her pharmacy.   The Entresto would be  412.99 for the first 30 days, then the next refill would be 67.79    The pharmacist did explain that she could go on line and look for \"free month trial\"  Cannot use Copay cards with Medicare    I also informed that she may qualify for financial assistance. She would need to  the form or we could mail it.     Left number for return call

## 2025-03-18 NOTE — ASSESSMENT & PLAN NOTE
Mildly dilated pulmonary trunk on CT 1/6/2024, no PE  Echo 1/30/25; RV size and systolic function are normal, unable to estimate RVSP due to suboptimal TR signal  No significant left sided VHD on Echo  Chronic LV HFpEF  Suspect sleep apnea, encouraged patient to pursue testing

## 2025-03-18 NOTE — ASSESSMENT & PLAN NOTE
Planned surgery; Laparoscopic sleeve gastrectomy  Risk factors include HTN, HPL, CAD s/p LEIDY to LAD 1/2024, CHF with preserved EF  LHC 1/2024 without residual CAD  Most recent echo 1/2025; LVEF 55-60%  No anginal symptoms reported  Recommend optimizing fluid status prior to considering surgery with general anesthesia

## 2025-03-18 NOTE — PATIENT INSTRUCTIONS
Stop the Lasix  You will start Torsemide 20mg, you will take twice per day    You will stop the Hydralazine  You will start Entresto 24-26, you will take twice per day    Get your labs around the 2nd week in April    Attempt to wrap the lower legs at night with ace wraps    Monitor your weight and BP    Follow up in 1 month   15

## 2025-03-19 ENCOUNTER — TELEPHONE (OUTPATIENT)
Dept: CARDIOLOGY CLINIC | Age: 65
End: 2025-03-19

## 2025-03-19 NOTE — TELEPHONE ENCOUNTER
I called Belkis back again regarding the price check of the Entresto, no answer, left VM and number for return call.

## 2025-03-24 ENCOUNTER — TELEPHONE (OUTPATIENT)
Dept: CARDIOLOGY CLINIC | Age: 65
End: 2025-03-24

## 2025-03-24 NOTE — TELEPHONE ENCOUNTER
I spoke with Belkis, I reviewed last OV regarding Entresto, pharmacist currently reported price check for Entresto 412$ per month for the first month, then after 30 days, then it will go to 67$ after that.    She understands and wants to investigate financial assistance. I informed her that I will mail her the paper work and she will isacc to return either by dropping off at [facility] or mail.     She verbalizes understanding.

## 2025-03-28 RX ORDER — INSULIN GLARGINE 100 [IU]/ML
52 INJECTION, SOLUTION SUBCUTANEOUS NIGHTLY
Qty: 5 ADJUSTABLE DOSE PRE-FILLED PEN SYRINGE | Refills: 2 | OUTPATIENT
Start: 2025-03-28

## 2025-03-28 RX ORDER — INSULIN GLARGINE 100 [IU]/ML
INJECTION, SOLUTION SUBCUTANEOUS
Qty: 15 ML | OUTPATIENT
Start: 2025-03-28

## 2025-03-28 NOTE — TELEPHONE ENCOUNTER
Received refill request for LANTUS SOLOSTAR 100 UNIT/ML injection pen  [facility]     Last OV: 11/11/24    Next OV: NONE    Last Labs: 02/21/25    Last Filled: 01/21/25    It was discontinued

## 2025-04-01 ENCOUNTER — TELEPHONE (OUTPATIENT)
Dept: CARDIOLOGY CLINIC | Age: 65
End: 2025-04-01

## 2025-04-10 ENCOUNTER — OFFICE VISIT (OUTPATIENT)
Dept: INTERNAL MEDICINE CLINIC | Age: 65
End: 2025-04-10
Payer: MEDICARE

## 2025-04-10 VITALS
HEART RATE: 84 BPM | WEIGHT: 293 LBS | HEIGHT: 66 IN | TEMPERATURE: 98.5 F | BODY MASS INDEX: 47.09 KG/M2 | SYSTOLIC BLOOD PRESSURE: 136 MMHG | OXYGEN SATURATION: 98 % | DIASTOLIC BLOOD PRESSURE: 88 MMHG

## 2025-04-10 DIAGNOSIS — I50.32 CHRONIC HEART FAILURE WITH PRESERVED EJECTION FRACTION (HFPEF) (HCC): ICD-10-CM

## 2025-04-10 DIAGNOSIS — R29.818 SUSPECTED SLEEP APNEA: ICD-10-CM

## 2025-04-10 DIAGNOSIS — E11.69 TYPE 2 DIABETES MELLITUS WITH OBESITY (HCC): ICD-10-CM

## 2025-04-10 DIAGNOSIS — I10 ESSENTIAL HYPERTENSION: ICD-10-CM

## 2025-04-10 DIAGNOSIS — E78.2 MIXED HYPERLIPIDEMIA: ICD-10-CM

## 2025-04-10 DIAGNOSIS — J03.00 STREP TONSILLITIS: Primary | ICD-10-CM

## 2025-04-10 DIAGNOSIS — J02.8 PHARYNGITIS DUE TO OTHER ORGANISM: ICD-10-CM

## 2025-04-10 DIAGNOSIS — Z01.818 PREOPERATIVE CLEARANCE: ICD-10-CM

## 2025-04-10 DIAGNOSIS — E78.01 FAMILIAL HYPERCHOLESTEROLEMIA: ICD-10-CM

## 2025-04-10 DIAGNOSIS — K21.9 CHRONIC GERD: ICD-10-CM

## 2025-04-10 DIAGNOSIS — E66.9 TYPE 2 DIABETES MELLITUS WITH OBESITY (HCC): ICD-10-CM

## 2025-04-10 PROBLEM — F33.1 MODERATE EPISODE OF RECURRENT MAJOR DEPRESSIVE DISORDER (HCC): Status: RESOLVED | Noted: 2024-01-18 | Resolved: 2025-04-10

## 2025-04-10 LAB
INR PPP: 0.93 (ref 0.85–1.15)
Lab: NORMAL
PROTHROMBIN TIME: 12.7 SEC (ref 11.9–14.9)
QC PASS/FAIL: NORMAL
S PYO AG THROAT QL: POSITIVE
SARS-COV-2 RDRP RESP QL NAA+PROBE: NEGATIVE

## 2025-04-10 PROCEDURE — 1036F TOBACCO NON-USER: CPT | Performed by: INTERNAL MEDICINE

## 2025-04-10 PROCEDURE — 87880 STREP A ASSAY W/OPTIC: CPT | Performed by: INTERNAL MEDICINE

## 2025-04-10 PROCEDURE — 3075F SYST BP GE 130 - 139MM HG: CPT | Performed by: INTERNAL MEDICINE

## 2025-04-10 PROCEDURE — 1123F ACP DISCUSS/DSCN MKR DOCD: CPT | Performed by: INTERNAL MEDICINE

## 2025-04-10 PROCEDURE — 3079F DIAST BP 80-89 MM HG: CPT | Performed by: INTERNAL MEDICINE

## 2025-04-10 PROCEDURE — 1090F PRES/ABSN URINE INCON ASSESS: CPT | Performed by: INTERNAL MEDICINE

## 2025-04-10 PROCEDURE — 3017F COLORECTAL CA SCREEN DOC REV: CPT | Performed by: INTERNAL MEDICINE

## 2025-04-10 PROCEDURE — G8400 PT W/DXA NO RESULTS DOC: HCPCS | Performed by: INTERNAL MEDICINE

## 2025-04-10 PROCEDURE — 1159F MED LIST DOCD IN RCRD: CPT | Performed by: INTERNAL MEDICINE

## 2025-04-10 PROCEDURE — 87635 SARS-COV-2 COVID-19 AMP PRB: CPT | Performed by: INTERNAL MEDICINE

## 2025-04-10 PROCEDURE — G8427 DOCREV CUR MEDS BY ELIG CLIN: HCPCS | Performed by: INTERNAL MEDICINE

## 2025-04-10 PROCEDURE — 99213 OFFICE O/P EST LOW 20 MIN: CPT | Performed by: INTERNAL MEDICINE

## 2025-04-10 PROCEDURE — G8417 CALC BMI ABV UP PARAM F/U: HCPCS | Performed by: INTERNAL MEDICINE

## 2025-04-10 RX ORDER — AMOXICILLIN 500 MG/1
500 CAPSULE ORAL 3 TIMES DAILY
Qty: 30 CAPSULE | Refills: 0 | Status: SHIPPED | OUTPATIENT
Start: 2025-04-10 | End: 2025-04-20

## 2025-04-10 RX ORDER — LIDOCAINE HYDROCHLORIDE 20 MG/ML
15 SOLUTION OROPHARYNGEAL
Qty: 100 ML | Refills: 0 | Status: SHIPPED | OUTPATIENT
Start: 2025-04-10 | End: 2025-04-13

## 2025-04-10 ASSESSMENT — ENCOUNTER SYMPTOMS
WHEEZING: 0
SINUS PAIN: 0
SORE THROAT: 1
VOMITING: 0
VISUAL CHANGE: 0
ABDOMINAL PAIN: 0
RHINORRHEA: 0
SWOLLEN GLANDS: 1
SINUS PRESSURE: 0
SHORTNESS OF BREATH: 0

## 2025-04-10 NOTE — PROGRESS NOTES
Belkis Morton  1960  female  65 y.o.    SUBJECTIVE:    Chief Complaint   Patient presents with    Sore Throat     Pt is here with complaints of sore throat and pain when swallowing. Patient states this has been going on for about a week. Patient has gargling salt water with no relief.       History of Present Illness     Pharyngitis  This is a new problem. The current episode started in the past 7 days. The problem occurs constantly. The problem has been gradually worsening. Associated symptoms include a sore throat and swollen glands. Pertinent negatives include no abdominal pain, anorexia, chest pain, chills, congestion, diaphoresis, fatigue, headaches, myalgias, neck pain, numbness, visual change, vomiting or weakness. The symptoms are aggravated by swallowing. The treatment provided no relief.           Past Medical History:   Diagnosis Date    Anxiety     Arthritis     Asthma     as child    Bilateral chronic knee pain     sees pain management    CAD (coronary artery disease)     Degenerative disc disease, cervical MRI 2011    Multilevel cervical degenerative this disease most significant at the C7-T1 level where there is probable impingement of the exiting right C8 nerve root caused by disc herniation.    Depression     Diabetes     type 2    High blood pressure     Hypertriglyceridemia     Lumbar herniated disc MRI 2011    L5-S1    Morbid obesity     Opiate dependence (HCC)     Osteomyelitis (HCC)     Pneumonia     as child    Presbyopia     Sleep apnea     STEMI (ST elevation myocardial infarction) ([facility]) 01/08/2024     Past Surgical History:   Procedure Laterality Date    CARDIAC CATHETERIZATION  11/2017    1.  Diffusely diseased distal LAD.  Distal LAD has a 50% narrowing in two ---    COLONOSCOPY N/A 8/15/2024    COLONOSCOPY DIAGNOSTIC performed by Jesus Lou MD at [facility] ENDOSCOPY    CORONARY STENT PLACEMENT  01/08/2024    EYE SURGERY Bilateral     cataract     Social History

## 2025-04-11 LAB
25(OH)D3 SERPL-MCNC: 10.6 NG/ML
ALBUMIN SERPL-MCNC: 4.3 G/DL (ref 3.4–5)
ALBUMIN/GLOB SERPL: 1.4 {RATIO} (ref 1.1–2.2)
ALP SERPL-CCNC: 118 U/L (ref 40–129)
ALT SERPL-CCNC: 17 U/L (ref 10–40)
ANION GAP SERPL CALCULATED.3IONS-SCNC: 10 MMOL/L (ref 3–16)
AST SERPL-CCNC: 18 U/L (ref 15–37)
BASOPHILS # BLD: 0.1 K/UL (ref 0–0.2)
BASOPHILS NFR BLD: 0.7 %
BILIRUB SERPL-MCNC: 0.5 MG/DL (ref 0–1)
BUN SERPL-MCNC: 21 MG/DL (ref 7–20)
CALCIUM SERPL-MCNC: 9.3 MG/DL (ref 8.3–10.6)
CHLORIDE SERPL-SCNC: 101 MMOL/L (ref 99–110)
CHOLEST SERPL-MCNC: 161 MG/DL (ref 0–199)
CO2 SERPL-SCNC: 31 MMOL/L (ref 21–32)
CREAT SERPL-MCNC: 1.1 MG/DL (ref 0.6–1.2)
DEPRECATED RDW RBC AUTO: 16.1 % (ref 12.4–15.4)
EOSINOPHIL # BLD: 0.2 K/UL (ref 0–0.6)
EOSINOPHIL NFR BLD: 3 %
EST. AVERAGE GLUCOSE BLD GHB EST-MCNC: 142.7 MG/DL
FOLATE SERPL-MCNC: 10.2 NG/ML (ref 4.78–24.2)
GFR SERPLBLD CREATININE-BSD FMLA CKD-EPI: 55 ML/MIN/{1.73_M2}
GLUCOSE SERPL-MCNC: 128 MG/DL (ref 70–99)
HBA1C MFR BLD: 6.6 %
HCT VFR BLD AUTO: 35.2 % (ref 36–48)
HDLC SERPL-MCNC: 95 MG/DL (ref 40–60)
HGB BLD-MCNC: 12.1 G/DL (ref 12–16)
IRON SATN MFR SERPL: 16 % (ref 15–50)
IRON SERPL-MCNC: 47 UG/DL (ref 37–145)
LDLC SERPL CALC-MCNC: 47 MG/DL
LYMPHOCYTES # BLD: 2 K/UL (ref 1–5.1)
LYMPHOCYTES NFR BLD: 28 %
MCH RBC QN AUTO: 29.9 PG (ref 26–34)
MCHC RBC AUTO-ENTMCNC: 34.5 G/DL (ref 31–36)
MCV RBC AUTO: 86.8 FL (ref 80–100)
MONOCYTES # BLD: 0.6 K/UL (ref 0–1.3)
MONOCYTES NFR BLD: 8.5 %
NEUTROPHILS # BLD: 4.2 K/UL (ref 1.7–7.7)
NEUTROPHILS NFR BLD: 59.8 %
PLATELET # BLD AUTO: 275 K/UL (ref 135–450)
PMV BLD AUTO: 8.9 FL (ref 5–10.5)
POTASSIUM SERPL-SCNC: 4.1 MMOL/L (ref 3.5–5.1)
PROT SERPL-MCNC: 7.3 G/DL (ref 6.4–8.2)
RBC # BLD AUTO: 4.05 M/UL (ref 4–5.2)
SODIUM SERPL-SCNC: 142 MMOL/L (ref 136–145)
TIBC SERPL-MCNC: 289 UG/DL (ref 260–445)
TRIGL SERPL-MCNC: 97 MG/DL (ref 0–150)
TSH SERPL DL<=0.005 MIU/L-ACNC: 0.99 UIU/ML (ref 0.27–4.2)
VIT B12 SERPL-MCNC: 563 PG/ML (ref 211–911)
VLDLC SERPL CALC-MCNC: 19 MG/DL
WBC # BLD AUTO: 7 K/UL (ref 4–11)

## 2025-04-13 LAB
A-TOCOPHEROL VIT E SERPL-MCNC: 10 MG/L (ref 5.5–18)
ANNOTATION COMMENT IMP: NORMAL
BETA+GAMMA TOCOPHEROL SERPL-MCNC: 3.2 MG/L (ref 0–6)
RETINYL PALMITATE SERPL-MCNC: 0.02 MG/L (ref 0–0.1)
VIT A SERPL-MCNC: 0.76 MG/L (ref 0.3–1.2)

## 2025-04-14 LAB — VIT B1 BLD-MCNC: 115 NMOL/L (ref 70–180)

## 2025-04-15 ENCOUNTER — OFFICE VISIT (OUTPATIENT)
Dept: BARIATRICS/WEIGHT MGMT | Age: 65
End: 2025-04-15
Payer: MEDICARE

## 2025-04-15 VITALS
BODY MASS INDEX: 47.09 KG/M2 | HEART RATE: 109 BPM | WEIGHT: 293 LBS | SYSTOLIC BLOOD PRESSURE: 172 MMHG | DIASTOLIC BLOOD PRESSURE: 90 MMHG | HEIGHT: 66 IN

## 2025-04-15 DIAGNOSIS — E66.01 MORBID OBESITY WITH BMI OF 50.0-59.9, ADULT: ICD-10-CM

## 2025-04-15 DIAGNOSIS — E66.9 TYPE 2 DIABETES MELLITUS WITH OBESITY (HCC): Primary | ICD-10-CM

## 2025-04-15 DIAGNOSIS — E11.69 TYPE 2 DIABETES MELLITUS WITH OBESITY (HCC): Primary | ICD-10-CM

## 2025-04-15 DIAGNOSIS — K21.9 CHRONIC GERD: ICD-10-CM

## 2025-04-15 DIAGNOSIS — R29.818 SUSPECTED SLEEP APNEA: ICD-10-CM

## 2025-04-15 DIAGNOSIS — E78.01 FAMILIAL HYPERCHOLESTEROLEMIA: ICD-10-CM

## 2025-04-15 DIAGNOSIS — I10 ESSENTIAL HYPERTENSION: ICD-10-CM

## 2025-04-15 PROCEDURE — 1090F PRES/ABSN URINE INCON ASSESS: CPT | Performed by: SURGERY

## 2025-04-15 PROCEDURE — G8427 DOCREV CUR MEDS BY ELIG CLIN: HCPCS | Performed by: SURGERY

## 2025-04-15 PROCEDURE — G2211 COMPLEX E/M VISIT ADD ON: HCPCS | Performed by: SURGERY

## 2025-04-15 PROCEDURE — G8400 PT W/DXA NO RESULTS DOC: HCPCS | Performed by: SURGERY

## 2025-04-15 PROCEDURE — 99214 OFFICE O/P EST MOD 30 MIN: CPT | Performed by: SURGERY

## 2025-04-15 PROCEDURE — 1123F ACP DISCUSS/DSCN MKR DOCD: CPT | Performed by: SURGERY

## 2025-04-15 PROCEDURE — 3017F COLORECTAL CA SCREEN DOC REV: CPT | Performed by: SURGERY

## 2025-04-15 PROCEDURE — 1160F RVW MEDS BY RX/DR IN RCRD: CPT | Performed by: SURGERY

## 2025-04-15 PROCEDURE — 3044F HG A1C LEVEL LT 7.0%: CPT | Performed by: SURGERY

## 2025-04-15 PROCEDURE — G8417 CALC BMI ABV UP PARAM F/U: HCPCS | Performed by: SURGERY

## 2025-04-15 PROCEDURE — 3080F DIAST BP >= 90 MM HG: CPT | Performed by: SURGERY

## 2025-04-15 PROCEDURE — 3077F SYST BP >= 140 MM HG: CPT | Performed by: SURGERY

## 2025-04-15 PROCEDURE — 2022F DILAT RTA XM EVC RTNOPTHY: CPT | Performed by: SURGERY

## 2025-04-15 PROCEDURE — 1036F TOBACCO NON-USER: CPT | Performed by: SURGERY

## 2025-04-15 PROCEDURE — 1159F MED LIST DOCD IN RCRD: CPT | Performed by: SURGERY

## 2025-04-15 NOTE — PATIENT INSTRUCTIONS
Patient received dietary handouts and education.    Plan/Recommendations:   - Aim to eat 4x/day including lean protein & produce at meals/snacks  - Meal plan/prep to avoid eating out   - Eliminate alcohol and carbonated beverages   - Try protein powders   - Attend SG

## 2025-04-15 NOTE — PROGRESS NOTES
[facility]   [employer]  Herman Pcaheco MD, FACS, [address], [address]    [facility] [address] .  Phone: [phone]  Fax: [phone]          Chief Complaint   Patient presents with    Obesity     2nd pre surg          HPI:     Belkis Morton is a very pleasant 65 y.o. female with Body mass index is 49.71 kg/m². / Chronic Obesity.     Belkis has been struggling for several years now with obesity. Belkis feels the weight is an obstacle to achieve and perform things in daily living as well risk on health.     Patient  is very determined to lose weight and be healthy, and is working towards  surgical weight loss to achieve this goal. Pre-operative clearance and work up pending. Working hard to keep good dietary habits as well level of activity.  Patient denies any nausea, vomiting, fevers, chills, shortness of breath, chest pain, cough, constipation or difficulty urinating.    Pain Assessment   Denies any abdominal pain       Past Medical History:   Diagnosis Date    Anxiety     Arthritis     Asthma     as child    Bilateral chronic knee pain     sees pain management    CAD (coronary artery disease)     Degenerative disc disease, cervical MRI 2011    Multilevel cervical degenerative this disease most significant at the C7-T1 level where there is probable impingement of the exiting right C8 nerve root caused by disc herniation.    Depression     Diabetes     type 2    High blood pressure     Hypertriglyceridemia     Lumbar herniated disc MRI 2011    L5-S1    Morbid obesity     Opiate dependence (HCC)     Osteomyelitis     Pneumonia     as child    Presbyopia     Sleep apnea     STEMI (ST elevation myocardial infarction) (HCC) 01/08/2024     Past Surgical History:   Procedure Laterality Date    CARDIAC CATHETERIZATION  11/2017    1.  Diffusely diseased distal LAD.  Distal LAD has a 50% narrowing in two ---    COLONOSCOPY N/A 8/15/2024    COLONOSCOPY DIAGNOSTIC performed

## 2025-04-15 NOTE — PROGRESS NOTES
Belkis Morton lost 3.6 lbs over 1 mo.    Wake up: 9 AM  Breakfast: skips  Snack: none  Lunch: skips  Snack 2 PM: apple w/ pb   Dinner 9 PM: [facility] food- chix tandoori + potato dish   Snack: none  Bed around: 1-2 AM      Is pt consuming smaller portions? no    Is pt consuming at least 64 oz of fluids per day? Yes     Is pt consuming carbonated, caffeinated, or sugary beverages? yes drinks alcohol nightly to go to sleep mixed w/ 7up zero + lemon juice      Has pt sampled Unjury and/or Nectar protein? Encouraged pt to try grab'n'go's before next appointment.    Has patient attended a support group? Not scheduled     Exercise: No. Limited with mobility due to knee pain.     Plan/Recommendations:   - Aim to eat 4x/day including lean protein & produce at meals/snacks  - Meal plan/prep to avoid eating out   - Eliminate alcohol and carbonated beverages   - Try protein powders   - Attend SG     Handouts: SG schedule, frozen meal guide, protein bars/ shakes    Marilia Lockett RD

## 2025-04-18 ENCOUNTER — PREP FOR PROCEDURE (OUTPATIENT)
Dept: BARIATRICS/WEIGHT MGMT | Age: 65
End: 2025-04-18

## 2025-04-18 RX ORDER — SODIUM CHLORIDE 9 MG/ML
25 INJECTION, SOLUTION INTRAVENOUS PRN
Status: CANCELLED | OUTPATIENT
Start: 2025-04-18

## 2025-04-18 RX ORDER — SODIUM CHLORIDE 0.9 % (FLUSH) 0.9 %
5-40 SYRINGE (ML) INJECTION PRN
Status: CANCELLED | OUTPATIENT
Start: 2025-04-18

## 2025-04-18 RX ORDER — SODIUM CHLORIDE 0.9 % (FLUSH) 0.9 %
5-40 SYRINGE (ML) INJECTION EVERY 12 HOURS SCHEDULED
Status: CANCELLED | OUTPATIENT
Start: 2025-04-18

## 2025-04-24 ENCOUNTER — OFFICE VISIT (OUTPATIENT)
Dept: ENDOCRINOLOGY | Age: 65
End: 2025-04-24
Payer: MEDICARE

## 2025-04-24 VITALS
BODY MASS INDEX: 47.09 KG/M2 | WEIGHT: 293 LBS | TEMPERATURE: 98 F | DIASTOLIC BLOOD PRESSURE: 77 MMHG | RESPIRATION RATE: 14 BRPM | SYSTOLIC BLOOD PRESSURE: 136 MMHG | HEIGHT: 66 IN | HEART RATE: 93 BPM

## 2025-04-24 DIAGNOSIS — E78.2 MIXED HYPERLIPIDEMIA: ICD-10-CM

## 2025-04-24 DIAGNOSIS — Z79.4 TYPE 2 DIABETES MELLITUS WITH DIABETIC NEUROPATHY, WITH LONG-TERM CURRENT USE OF INSULIN (HCC): Primary | ICD-10-CM

## 2025-04-24 DIAGNOSIS — E11.40 TYPE 2 DIABETES MELLITUS WITH DIABETIC NEUROPATHY, WITH LONG-TERM CURRENT USE OF INSULIN (HCC): Primary | ICD-10-CM

## 2025-04-24 DIAGNOSIS — Z13.29 SCREENING FOR THYROID DISORDER: ICD-10-CM

## 2025-04-24 DIAGNOSIS — E55.9 HYPOVITAMINOSIS D: ICD-10-CM

## 2025-04-24 PROCEDURE — G8400 PT W/DXA NO RESULTS DOC: HCPCS | Performed by: INTERNAL MEDICINE

## 2025-04-24 PROCEDURE — 95251 CONT GLUC MNTR ANALYSIS I&R: CPT | Performed by: INTERNAL MEDICINE

## 2025-04-24 PROCEDURE — 1090F PRES/ABSN URINE INCON ASSESS: CPT | Performed by: INTERNAL MEDICINE

## 2025-04-24 PROCEDURE — 3017F COLORECTAL CA SCREEN DOC REV: CPT | Performed by: INTERNAL MEDICINE

## 2025-04-24 PROCEDURE — 3078F DIAST BP <80 MM HG: CPT | Performed by: INTERNAL MEDICINE

## 2025-04-24 PROCEDURE — 3044F HG A1C LEVEL LT 7.0%: CPT | Performed by: INTERNAL MEDICINE

## 2025-04-24 PROCEDURE — 1036F TOBACCO NON-USER: CPT | Performed by: INTERNAL MEDICINE

## 2025-04-24 PROCEDURE — G8417 CALC BMI ABV UP PARAM F/U: HCPCS | Performed by: INTERNAL MEDICINE

## 2025-04-24 PROCEDURE — 1159F MED LIST DOCD IN RCRD: CPT | Performed by: INTERNAL MEDICINE

## 2025-04-24 PROCEDURE — G8427 DOCREV CUR MEDS BY ELIG CLIN: HCPCS | Performed by: INTERNAL MEDICINE

## 2025-04-24 PROCEDURE — 3075F SYST BP GE 130 - 139MM HG: CPT | Performed by: INTERNAL MEDICINE

## 2025-04-24 PROCEDURE — 99214 OFFICE O/P EST MOD 30 MIN: CPT | Performed by: INTERNAL MEDICINE

## 2025-04-24 PROCEDURE — 1123F ACP DISCUSS/DSCN MKR DOCD: CPT | Performed by: INTERNAL MEDICINE

## 2025-04-24 PROCEDURE — 2022F DILAT RTA XM EVC RTNOPTHY: CPT | Performed by: INTERNAL MEDICINE

## 2025-04-24 RX ORDER — ERGOCALCIFEROL 1.25 MG/1
50000 CAPSULE, LIQUID FILLED ORAL WEEKLY
Qty: 12 CAPSULE | Refills: 1 | Status: SHIPPED | OUTPATIENT
Start: 2025-04-24

## 2025-04-24 RX ORDER — INSULIN GLARGINE 100 [IU]/ML
INJECTION, SOLUTION SUBCUTANEOUS NIGHTLY
COMMUNITY

## 2025-04-24 NOTE — PROGRESS NOTES
[facility] Endocrinology    Chief Complaint:     Chief Complaint   Patient presents with    Diabetes        Subjective:   Belkis Morton is a pleasant 65 y.o. female who presents for follow-up of type II Diabetes Mellitus. Patient also has hypertension, hyperlipidemia, asthma, osteoarthritis, CAD post stenting in Jan 2024, depression has a BMI of 43.  This is complicated with neuropathy.    History of Present Illness  The patient presents for follow up of type 2 diabetes mellitus.    She was diagnosed with diabetes in 2004 and has been on insulin therapy for over a decade.     She has been on Ozempic and Jardiance, but due to insurance issues, she is not currently taking Jardiance.   She started with Trulicity and then switched to Ozempic, but both medications have been inconsistent due to cost.     She administers her insulin injections in the stomach area and has not noticed any lumps, bumps, or bleeding at the injection sites. She does not like using the Dexcom device because she finds changing the sensor uncomfortable.   She is considering bariatric surgery and recently had been evaluated.    Most recent HbA1c:   Hemoglobin A1C   Date Value Ref Range Status   01/23/2025 6.2 % Final      Current regimen:  Ozempic 2 mg weekly, reports high cost $400.  Might not be able to  more prescriptions until she meets her deductible.  Basaglar 52 units at bedtime  Humalog 10 units before each meal, reports rare need for that.    Blood sugar ranges:  Review of Dexcom report over the past 2 weeks shows average glucose of 144 with GMI of 6.8%, time in range 85%, high at 13% and very high at 1%.  Low at 1%.  Occasional hyperglycemia noted after lunch otherwise blood sugars are in desired range for the most part.  No significant hypoglycemia.    Coronary artery disease -she had a heart attack in January 2024 and a stent was placed. She takes clopidogrel.    She experiences tingling and numbness in her feet and hands, which

## 2025-04-25 NOTE — PROGRESS NOTES
Patient Reached:  Yes_X__   No___     Voicemail Instructions Given: Yes___  No___  # Called:         Date: 5/ 2/ 2025        *Arrival Time Per Office        Location: [address] [place], [place]         -Please follow a clear liquid diet the entire day before the procedure.     -No liquids after midnight. Including no gum, candy or mints.     -Do not take any medications the day of the procedure.     -Please hold any GLP-1 medications (Ozempic, Mounjaro,Trulitcity, etc.) for 1 Week prior to procedure.     -Please hold SGLT2 Inhibitors (Invokana, Farxiga, Jardiance, etc.) for 3 Days prior to procedure.     -Follow all instructions that the office has given you.     -You will need a responsible adult to stay on site, and take you home after the procedure.     -Bring a complete list of all your medications and supplements that you currently take. Please include the name and dose of each.     -Dress comfortably.     -Bring Insurance Card and Photo ID.     -Any questions or concerns call Dr. Pacheco's office at [phone]        -Other:        **Follow Office Instructions about Aspirin     **Hold Semaglutide (Ozempic) dose on Tuesday (4/29/25).     ** Hold Jardiance start 4/29/25. No doses after Monday (4/28/25)              VISITOR POLICY(subject to change):     The current policy is 2 visitors per patient.There are no children allowed.Mask at discretion of facility. Visiting hours are 8a-8p.Overnight visitors will be at the discretion of the nurse. All policies are subject to change.

## 2025-04-25 NOTE — PROGRESS NOTES
Preop instructions sent via tu.nr. Patient informed.    Instructions given:    Verbal __X__    Voicemail ____

## 2025-04-28 ENCOUNTER — TELEPHONE (OUTPATIENT)
Dept: BARIATRICS/WEIGHT MGMT | Age: 65
End: 2025-04-28

## 2025-04-28 NOTE — TELEPHONE ENCOUNTER
Left vm reminding pt of egd this Friday Reminded pt of arrival time/ clear liquid diet/ NPO after midnight.

## 2025-05-02 ENCOUNTER — ANESTHESIA (OUTPATIENT)
Dept: ENDOSCOPY | Age: 65
End: 2025-05-02
Payer: MEDICARE

## 2025-05-02 ENCOUNTER — ANESTHESIA EVENT (OUTPATIENT)
Dept: ENDOSCOPY | Age: 65
End: 2025-05-02
Payer: MEDICARE

## 2025-05-02 ENCOUNTER — HOSPITAL ENCOUNTER (OUTPATIENT)
Age: 65
Setting detail: OUTPATIENT SURGERY
Discharge: HOME | End: 2025-05-02
Attending: SURGERY | Admitting: SURGERY
Payer: MEDICARE

## 2025-05-02 VITALS
OXYGEN SATURATION: 98 % | HEART RATE: 84 BPM | TEMPERATURE: 97 F | DIASTOLIC BLOOD PRESSURE: 78 MMHG | HEIGHT: 68 IN | RESPIRATION RATE: 16 BRPM | BODY MASS INDEX: 44.41 KG/M2 | WEIGHT: 293 LBS | SYSTOLIC BLOOD PRESSURE: 150 MMHG

## 2025-05-02 DIAGNOSIS — K21.9 CHRONIC GERD: ICD-10-CM

## 2025-05-02 PROBLEM — K44.9 HIATAL HERNIA: Status: ACTIVE | Noted: 2025-05-02

## 2025-05-02 LAB
GLUCOSE BLD-MCNC: 128 MG/DL (ref 70–99)
PERFORMED ON: ABNORMAL

## 2025-05-02 PROCEDURE — 3609012400 HC EGD TRANSORAL BIOPSY SINGLE/MULTIPLE: Performed by: SURGERY

## 2025-05-02 PROCEDURE — 43239 EGD BIOPSY SINGLE/MULTIPLE: CPT | Performed by: SURGERY

## 2025-05-02 PROCEDURE — 2709999900 HC NON-CHARGEABLE SUPPLY: Performed by: SURGERY

## 2025-05-02 PROCEDURE — 6360000002 HC RX W HCPCS: Performed by: NURSE ANESTHETIST, CERTIFIED REGISTERED

## 2025-05-02 PROCEDURE — 7100000011 HC PHASE II RECOVERY - ADDTL 15 MIN: Performed by: SURGERY

## 2025-05-02 PROCEDURE — 2580000003 HC RX 258: Performed by: SURGERY

## 2025-05-02 PROCEDURE — 3700000000 HC ANESTHESIA ATTENDED CARE: Performed by: SURGERY

## 2025-05-02 PROCEDURE — 88305 TISSUE EXAM BY PATHOLOGIST: CPT

## 2025-05-02 PROCEDURE — 7100000010 HC PHASE II RECOVERY - FIRST 15 MIN: Performed by: SURGERY

## 2025-05-02 RX ORDER — SODIUM CHLORIDE 0.9 % (FLUSH) 0.9 %
5-40 SYRINGE (ML) INJECTION EVERY 12 HOURS SCHEDULED
Status: DISCONTINUED | OUTPATIENT
Start: 2025-05-02 | End: 2025-05-02 | Stop reason: HOSPADM

## 2025-05-02 RX ORDER — PROPOFOL 10 MG/ML
INJECTION, EMULSION INTRAVENOUS
Status: DISCONTINUED | OUTPATIENT
Start: 2025-05-02 | End: 2025-05-02 | Stop reason: SDUPTHER

## 2025-05-02 RX ORDER — SODIUM CHLORIDE 9 MG/ML
25 INJECTION, SOLUTION INTRAVENOUS PRN
Status: DISCONTINUED | OUTPATIENT
Start: 2025-05-02 | End: 2025-05-02 | Stop reason: HOSPADM

## 2025-05-02 RX ORDER — LIDOCAINE HYDROCHLORIDE 20 MG/ML
INJECTION, SOLUTION EPIDURAL; INFILTRATION; INTRACAUDAL; PERINEURAL
Status: DISCONTINUED | OUTPATIENT
Start: 2025-05-02 | End: 2025-05-02 | Stop reason: SDUPTHER

## 2025-05-02 RX ORDER — OMEPRAZOLE 20 MG/1
20 CAPSULE, DELAYED RELEASE ORAL
Qty: 90 CAPSULE | Refills: 1 | Status: SHIPPED | OUTPATIENT
Start: 2025-05-02

## 2025-05-02 RX ORDER — SODIUM CHLORIDE 0.9 % (FLUSH) 0.9 %
5-40 SYRINGE (ML) INJECTION PRN
Status: DISCONTINUED | OUTPATIENT
Start: 2025-05-02 | End: 2025-05-02 | Stop reason: HOSPADM

## 2025-05-02 RX ADMIN — SODIUM CHLORIDE 25 ML: 0.9 INJECTION, SOLUTION INTRAVENOUS at 10:13

## 2025-05-02 RX ADMIN — PROPOFOL 50 MG: 10 INJECTION, EMULSION INTRAVENOUS at 10:24

## 2025-05-02 RX ADMIN — LIDOCAINE HYDROCHLORIDE 60 MG: 20 INJECTION, SOLUTION EPIDURAL; INFILTRATION; INTRACAUDAL; PERINEURAL at 10:21

## 2025-05-02 RX ADMIN — PROPOFOL 150 MG: 10 INJECTION, EMULSION INTRAVENOUS at 10:21

## 2025-05-02 RX ADMIN — PROPOFOL 50 MG: 10 INJECTION, EMULSION INTRAVENOUS at 10:23

## 2025-05-02 ASSESSMENT — PAIN - FUNCTIONAL ASSESSMENT: PAIN_FUNCTIONAL_ASSESSMENT: 0-10

## 2025-05-02 NOTE — DISCHARGE INSTRUCTIONS
ENDOSCOPY DISCHARGE INSTRUCTIONS    You may experience some lightheadedness for the next several hours.  Plan on quiet relaxation for the rest of today.  A responsible adult needs to stay with you today.  Because of the medications you received today-do not drive,operate machinery,or sign any contractual agreement for the next 24 hours.  Do not drink any alcoholic beverages or take any unprescribed medications tonight.  Eat bland food and avoid anything greasy or spicy initially-progress to your normal diet gradually.  Diet restrictions as instructed.  If you have any of the following problems, notify your physician or return to the hospital emergency room : fever, chills, excessive bleeding, excessive vomiting, difficulty swallowing, uncontrolled pain, increased abdominal distention, shortness of breath or any other problems.  If you have a sore throat, you may use lozenges or salt water gargles.  Please call Dr. Pacheco's office in 5 business days for biopsy results [phone].      ANESTHESIA DISCHARGE INSTRUCTIONS    Wear your seatbelt home.  You are under the influence of drugs-do not drink alcohol,drive,operate machinery,or make any important decisions or sign any legal documentsfor 24 hours  Children should not ride bikes,skate boards or play on gym sets  for 24 hours after surgery.  A responsible adult needs to be with you for 24 hours.  You may experience lightheadedness,dizziness,or sleepiness following surgery.  Rest at home today- increase activity as tolerated.  Progress slowly to a regular diet unless your physician has instructed you otherwise.Drink plenty of water.  If nausea becomes a problem call your physician.  Coughing,sore throat,and muscle aches are other side effects of anesthesia,and should improve with time.  Do not drive,operate machinery while taking narcotics.

## 2025-05-02 NOTE — H&P
Department of General Surgery - Adult Surgical Service   [facility]   [employer]  Attending Pre-operative History and Physical      DIAGNOSIS:  Obesity    INDICATION:  Pre-op    PROCEDURE:  EGD    CHIEF COMPLAINT:  Obesity    History Obtained From:  patient    HISTORY OF PRESENT ILLNESS:    The patient is a 65 y.o. female with significant past medical history of   Patient Active Problem List   Diagnosis    OM (osteomyelitis) ([facility])    Arthritis    DM (diabetes mellitus) ([facility])    Osteomyelitis of spine ([facility])    Diskitis    Opiate analgesic use agreement exists    Type 2 diabetes mellitus with diabetic neuropathy ([facility])    Insomnia    Submucous leiomyoma of uterus    PMB (postmenopausal bleeding)    Adiposity    Osteoarthritis of hand    Urticaria    Muscle spasms of both lower extremities    Mild intermittent asthma without complication    Chest pain    Abnormal EKG    Essential hypertension    Age-related cataract of both eyes    Abnormal myocardial perfusion study    Familial hypercholesterolemia    Pneumonia    Recurrent major depressive disorder, in full remission    Chronic systolic (congestive) heart failure    Pharyngitis    NSTEMI (non-ST elevated myocardial infarction) ([facility])    CAD (coronary artery disease)    Morbid obesity with BMI of 50.0-59.9, adult ([facility])    Hx of heart artery stent    HLD (hyperlipidemia)    Chronic heart failure with preserved ejection fraction (HFpEF) ([facility])    Suspected sleep apnea    Type 2 diabetes mellitus with obesity ([facility])    Encounter for preoperative assessment for noncoronary cardiac surgery    Leg swelling    Dilated pulmonary trunk ([facility])    Chronic GERD    Hypovitaminosis D    Screening for thyroid disorder      who presents for pre-op EGD    Past Medical History:        Diagnosis Date    Anxiety     Arthritis     Asthma     as child    Bilateral chronic knee pain     sees pain management    CAD (coronary artery disease)     Degenerative disc  (ENTRESTO) 24-26 MG per tablet, Take 1 tablet by mouth 2 times daily (Patient not taking: Reported on 5/2/2025)  blood glucose monitor strips, Test 3 times a day & as needed for symptoms of irregular blood glucose. Dispense sufficient amount for indicated testing frequency plus additional to accommodate PRN testing needs.  ibuprofen (ADVIL;MOTRIN) 600 MG tablet, Take 1 tablet by mouth 3 times daily as needed for Pain (Patient not taking: Reported on 5/2/2025)  Insulin Pen Needle 32G X 4 MM MISC, Use to give LA and SA insulin 5 x daily  insulin lispro (HUMALOG KWIKPEN) 200 UNIT/ML SOPN pen, Inject 10 Units into the skin 3 times daily as needed for High Blood Sugar (sliding scale: add 2 units insulin for every 50 mg/dL > 150 mg/dL; 151-199 add 2 units, 200-249 add 4 units, 250-299 add 6 units, 300-349 add 8 units, 350+ add 10 units) (Patient taking differently: Inject 10 Units into the skin 3 times daily as needed for High Blood Sugar (sliding scale: add 2 units insulin for every 50 mg/dL > 150 mg/dL; 151-199 add 2 units, 200-249 add 4 units, 250-299 add 6 units, 300-349 add 8 units, 350+ add 10 units) REPORTS NOT TAKING IT REGULARLY)  blood glucose monitor kit and supplies, Dispense sufficient amount for indicated testing frequency plus additional to accommodate PRN testing needs. Dispense all needed supplies to include: monitor, strips, lancing device, lancets, control solutions, alcohol swabs.  Lancets MISC, 1 each by Does not apply route 3 times daily  albuterol sulfate HFA (PROAIR HFA) 108 (90 Base) MCG/ACT inhaler, Inhale 2 puffs into the lungs every 6 hours as needed for Wheezing  nitroGLYCERIN (NITROSTAT) 0.4 MG SL tablet, PLACE 1 TABLET UNDER TONGUE EVERY 5 MINUTES AS NEEDED FOR CHEST PAIN. MAX OF 3 DOSES  Blood Pressure KIT, Use as directed  UNABLE TO FIND, Glucometer #1, E11.9, testing TID. ON INSULIN.  Home pt. DISPENSE METER BEST COVERED BY INSURANCE    Allergies:  Lisinopril, Zolpidem, Zolpidem tartrate

## 2025-05-02 NOTE — ANESTHESIA PRE PROCEDURE
Department of Anesthesiology  Preprocedure Note       Name:  Belkis Morton   Age:  65 y.o.  :  1960                                          MRN:  [id number]         Date:  2025      Surgeon: Surgeon(s):  Herman Pacheco MD    Procedure: Procedure(s):  ESOPHAGOGASTRODUODENOSCOPY    Medications prior to admission:   Prior to Admission medications    Medication Sig Start Date End Date Taking? Authorizing Provider   insulin glargine (BASAGLAR KWIKPEN) 100 UNIT/ML injection pen Inject into the skin nightly 52 units daily   Yes Oscar Pretty MD   aspirin 81 MG chewable tablet Take 1 tablet by mouth daily 25  Yes Jessica Justice DO   metoprolol (LOPRESSOR) 100 MG tablet Take 1 tablet by mouth daily 25  Yes Jessica Justice DO   NIFEdipine (PROCARDIA XL) 60 MG extended release tablet Take 1 tablet by mouth daily 25  Yes Savage Louie MD   atorvastatin (LIPITOR) 80 MG tablet Take 1 tablet by mouth nightly 24  Yes Benny Gaines MD   pregabalin (LYRICA) 100 MG capsule Take 2 capsules by mouth nightly.   Yes Oscar Pretty MD   oxyCODONE-acetaminophen (PERCOCET)  MG per tablet Take 1 tablet by mouth 3 times daily as needed for Pain. Per pain management at [facility]   Yes Oscar Pretty MD   empagliflozin (JARDIANCE) 10 MG tablet Take 1 tablet by mouth daily 25   Paola Magallanes MD   semaglutide, 2 MG/DOSE, (OZEMPIC) 8 MG/3ML SOPN sc injection Inject 2 mg into the skin every 7 days 25   Paola Magallanes MD   vitamin D (ERGOCALCIFEROL) 1.25 MG (36987 UT) CAPS capsule Take 1 capsule by mouth once a week 25   Paola Magallanes MD   torsemide (DEMADEX) 20 MG tablet Take 1 tablet by mouth 2 times daily 3/18/25   Jessica Justice DO   sacubitril-valsartan (ENTRESTO) 24-26 MG per tablet Take 1 tablet by mouth 2 times daily  Patient not taking: Reported on 2025 3/18/25   Zysek, Jessica, DO   blood glucose monitor strips Test 3 times a day & as

## 2025-05-02 NOTE — PROGRESS NOTES
Ozempic on Tuesday, 4/29/25 last & aspirin 81 mg yesterday & DR Nguyen & Dr Pacheco aware. Jardiance  was last 4/28 & entresto is new prescription & not started yet.

## 2025-05-02 NOTE — ANESTHESIA POSTPROCEDURE EVALUATION
Department of Anesthesiology  Postprocedure Note    Patient: Belkis Morton  MRN: [id number]  YOB: 1960  Date of evaluation: 5/2/2025    Procedure Summary       Date: 05/02/25 Room / Location: [place] [address] / [facility]    Anesthesia Start: 1018 Anesthesia Stop: 1038    Procedure: ESOPHAGOGASTRODUODENOSCOPY BIOPSY (Abdomen) Diagnosis:       Chronic GERD      (Chronic GERD [K21.9])    Surgeons: Herman Pacheco MD Responsible Provider: Omar Nguyen MD    Anesthesia Type: MAC ASA Status: 3            Anesthesia Type: No value filed.    Shira Phase I: Shira Score: 10    Shira Phase II: Shira Score: 10    Anesthesia Post Evaluation    Patient location during evaluation: PACU  Patient participation: complete - patient participated  Level of consciousness: awake  Pain score: 2  Airway patency: patent  Cardiovascular status: blood pressure returned to baseline  Respiratory status: acceptable  Hydration status: euvolemic  Pain management: adequate    No notable events documented.

## 2025-05-13 ENCOUNTER — INITIAL CONSULT (OUTPATIENT)
Dept: BARIATRICS/WEIGHT MGMT | Age: 65
End: 2025-05-13
Payer: MEDICARE

## 2025-05-13 DIAGNOSIS — E66.01 MORBID OBESITY WITH BMI OF 45.0-49.9, ADULT (HCC): ICD-10-CM

## 2025-05-13 DIAGNOSIS — F43.22 ADJUSTMENT REACTION WITH ANXIETY: Primary | ICD-10-CM

## 2025-05-13 PROCEDURE — 1123F ACP DISCUSS/DSCN MKR DOCD: CPT | Performed by: PSYCHOLOGIST

## 2025-05-13 PROCEDURE — 90791 PSYCH DIAGNOSTIC EVALUATION: CPT | Performed by: PSYCHOLOGIST

## 2025-05-13 PROCEDURE — 96136 PSYCL/NRPSYC TST PHY/QHP 1ST: CPT | Performed by: PSYCHOLOGIST

## 2025-05-13 PROCEDURE — 1036F TOBACCO NON-USER: CPT | Performed by: PSYCHOLOGIST

## 2025-05-13 PROCEDURE — 96130 PSYCL TST EVAL PHYS/QHP 1ST: CPT | Performed by: PSYCHOLOGIST

## 2025-05-13 NOTE — PROGRESS NOTES
Belkis Morton presented for her presurgical psychological evaluation on 05/13/2025. The patient is pursuing bariatric surgery secondary to a medical diagnosis of morbid obesity. The evaluation consisted of a clinical interview, the Eating Habits Checklist, the Binge Eating Disorder Screener - 7 (BEDS-7), and the Symptom Nivqrcnmd-18-Y (SCL-90-R) administered by the provider.    Based on the evaluation, Belkis Morton is considered to be an appropriate candidate for bariatric surgery from a psychological standpoint, provided she demonstrates the ability to effectively implement and sustain presurgical dietary recommendations. She exhibits mild anxiety secondary to her health and impending surgery. She acknowledges a history of mild, situational depressive symptoms approximately 10 years ago for which she was briefly prescribed Cymbalta by her family physician. She reports no significant benefit from the medication, with eventual remission of her symptoms on their own. She denies any current depressive symptoms. She reports no history of psychological intervention. She does not recall having taken psychotropic medications at any other time in the past. She denies a history of severe depression, including suicidal ideation or suicide attempts. She has never been hospitalized psychiatrically. She denies a history of chemical abuse or dependence. She is a non-smoker. She reports a history of drinking alcohol a few times per week, but states she has discontinued her alcohol use in preparation for surgery. She denies any other recreational or illicit drug use history.      Belkis Morton has never been diagnosed with an eating disorder, and her responses in the interview and on the Eating Habits Checklist and the BEDS-7 do not warrant a clinical diagnosis. She denies eating in response to emotional stress or boredom. She acknowledges a tendency to skip regular meals, stating she is currently only eating twice per

## 2025-05-22 ENCOUNTER — CLINICAL DOCUMENTATION (OUTPATIENT)
Dept: BARIATRICS/WEIGHT MGMT | Age: 65
End: 2025-05-22

## 2025-05-22 NOTE — PROGRESS NOTES
This eval was conducted via phone on 5/22/25 in preparation or their pre-surgical visit on 5/27/25 with Dr. Pacheco.     Belkis Morton lost 6.2 lbs over 1 mos. Self- reported wt: 301.8 lbs    Is pt eating at least 4 times everyday? Reports waiting until 4pm for first meal. Wakes up around 9am.   B: bagel + lox OR scrambled egg + toast berries OR none  L: skips OR bought items to try smoothie (yogurt +berries + pineapple)  D: 4pm sushi + wonton soup     Is pt eating a lean protein source with all meals and snacks? yes      Has pt decreased their portions using the plate method? no    Is pt choosing low fat/sugar free options?  Trying to read labels    Is pt drinking at least 64 oz of clear liquids everyday? yes      Has pt stopped drinking carbonation, caffeinated, and sugar sweetened beverages? yes      Has pt sampled Unjury and/or Nectar protein? yes tried and likes 4 flavors    Has pt attended a support group? Scheduled Discussed     Participating in intentional exercise? no limited with knee    Plan/Recommendations:   - Aim for at least 3 meals per day  - Try protein shake at lunch   - Focus on protein and produce at every meal and snack  - Attend SG    Handouts: protein snacks    PAKO QUINN, MS, RD, LD

## 2025-05-24 PROBLEM — Z13.29 SCREENING FOR THYROID DISORDER: Status: RESOLVED | Noted: 2025-04-24 | Resolved: 2025-05-24

## 2025-05-27 ENCOUNTER — PATIENT MESSAGE (OUTPATIENT)
Dept: CARDIOLOGY CLINIC | Age: 65
End: 2025-05-27

## 2025-05-27 ENCOUNTER — OFFICE VISIT (OUTPATIENT)
Dept: BARIATRICS/WEIGHT MGMT | Age: 65
End: 2025-05-27
Payer: MEDICARE

## 2025-05-27 VITALS
RESPIRATION RATE: 16 BRPM | HEART RATE: 90 BPM | DIASTOLIC BLOOD PRESSURE: 82 MMHG | SYSTOLIC BLOOD PRESSURE: 140 MMHG | WEIGHT: 293 LBS | BODY MASS INDEX: 47.09 KG/M2 | HEIGHT: 66 IN | OXYGEN SATURATION: 97 %

## 2025-05-27 DIAGNOSIS — K21.9 CHRONIC GERD: ICD-10-CM

## 2025-05-27 DIAGNOSIS — E66.01 MORBID OBESITY WITH BMI OF 50.0-59.9, ADULT (HCC): ICD-10-CM

## 2025-05-27 DIAGNOSIS — E11.69 TYPE 2 DIABETES MELLITUS WITH OBESITY (HCC): ICD-10-CM

## 2025-05-27 DIAGNOSIS — K44.9 HIATAL HERNIA: Primary | ICD-10-CM

## 2025-05-27 DIAGNOSIS — E66.9 TYPE 2 DIABETES MELLITUS WITH OBESITY (HCC): ICD-10-CM

## 2025-05-27 DIAGNOSIS — I10 ESSENTIAL HYPERTENSION: ICD-10-CM

## 2025-05-27 DIAGNOSIS — R29.818 SUSPECTED SLEEP APNEA: ICD-10-CM

## 2025-05-27 DIAGNOSIS — E78.01 FAMILIAL HYPERCHOLESTEROLEMIA: ICD-10-CM

## 2025-05-27 PROCEDURE — 1090F PRES/ABSN URINE INCON ASSESS: CPT | Performed by: SURGERY

## 2025-05-27 PROCEDURE — 1123F ACP DISCUSS/DSCN MKR DOCD: CPT | Performed by: SURGERY

## 2025-05-27 PROCEDURE — 3079F DIAST BP 80-89 MM HG: CPT | Performed by: SURGERY

## 2025-05-27 PROCEDURE — 1159F MED LIST DOCD IN RCRD: CPT | Performed by: SURGERY

## 2025-05-27 PROCEDURE — 3044F HG A1C LEVEL LT 7.0%: CPT | Performed by: SURGERY

## 2025-05-27 PROCEDURE — 1160F RVW MEDS BY RX/DR IN RCRD: CPT | Performed by: SURGERY

## 2025-05-27 PROCEDURE — 2022F DILAT RTA XM EVC RTNOPTHY: CPT | Performed by: SURGERY

## 2025-05-27 PROCEDURE — 99214 OFFICE O/P EST MOD 30 MIN: CPT | Performed by: SURGERY

## 2025-05-27 PROCEDURE — G2211 COMPLEX E/M VISIT ADD ON: HCPCS | Performed by: SURGERY

## 2025-05-27 PROCEDURE — G8400 PT W/DXA NO RESULTS DOC: HCPCS | Performed by: SURGERY

## 2025-05-27 PROCEDURE — 1036F TOBACCO NON-USER: CPT | Performed by: SURGERY

## 2025-05-27 PROCEDURE — G8427 DOCREV CUR MEDS BY ELIG CLIN: HCPCS | Performed by: SURGERY

## 2025-05-27 PROCEDURE — 3017F COLORECTAL CA SCREEN DOC REV: CPT | Performed by: SURGERY

## 2025-05-27 PROCEDURE — G8417 CALC BMI ABV UP PARAM F/U: HCPCS | Performed by: SURGERY

## 2025-05-27 PROCEDURE — 3077F SYST BP >= 140 MM HG: CPT | Performed by: SURGERY

## 2025-05-27 NOTE — PROGRESS NOTES
[facility]   [employer]  Herman Pacheco MD, FACS, [address], [address]    [facility] [address] .  Phone: [phone]  Fax: [phone]          Chief Complaint   Patient presents with    Obesity     3rd pre-surg         HPI:     Belkis Morton is a very pleasant 65 y.o. female with Body mass index is 49.71 kg/m². / Chronic Obesity.     Beliks has been struggling for several years now with obesity. Belkis feels the weight is an obstacle to achieve and perform things in daily living as well risk on health.     Patient  is very determined to lose weight and be healthy, and is working towards  surgical weight loss to achieve this goal. Pre-operative clearance and work up pending. Working hard to keep good dietary habits as well level of activity.  Patient denies any nausea, vomiting, fevers, chills, shortness of breath, chest pain, cough, constipation or difficulty urinating.    Pain Assessment   Denies any abdominal pain       Past Medical History:   Diagnosis Date    Anxiety     Arthritis     Asthma     as child    Bilateral chronic knee pain     sees pain management    CAD (coronary artery disease)     Degenerative disc disease, cervical MRI 2011    Multilevel cervical degenerative this disease most significant at the C7-T1 level where there is probable impingement of the exiting right C8 nerve root caused by disc herniation.    Depression     Diabetes     type 2    High blood pressure     Hypertriglyceridemia     Lumbar herniated disc MRI 2011    L5-S1    Morbid obesity (HCC)     Opiate dependence (HCC)     Osteomyelitis (HCC)     Pneumonia     as child    Presbyopia     Sleep apnea     STEMI (ST elevation myocardial infarction) (HCC) 01/08/2024     Past Surgical History:   Procedure Laterality Date    CARDIAC CATHETERIZATION  11/2017    1.  Diffusely diseased distal LAD.  Distal LAD has a 50% narrowing in two ---    COLONOSCOPY N/A 8/15/2024    COLONOSCOPY

## 2025-06-17 PROBLEM — E66.01 MORBID OBESITY WITH BMI OF 45.0-49.9, ADULT (HCC): Chronic | Status: ACTIVE | Noted: 2025-01-23

## 2025-06-17 PROBLEM — I50.22 CHRONIC SYSTOLIC (CONGESTIVE) HEART FAILURE (HCC): Chronic | Status: ACTIVE | Noted: 2023-11-20

## 2025-06-17 PROBLEM — E11.69 TYPE 2 DIABETES MELLITUS WITH OBESITY (HCC): Chronic | Status: ACTIVE | Noted: 2025-03-11

## 2025-06-17 PROBLEM — E66.9 TYPE 2 DIABETES MELLITUS WITH OBESITY (HCC): Chronic | Status: ACTIVE | Noted: 2025-03-11

## 2025-06-17 PROBLEM — I25.10 CAD (CORONARY ARTERY DISEASE): Chronic | Status: ACTIVE | Noted: 2024-09-26

## 2025-06-17 PROBLEM — I50.32 CHRONIC HEART FAILURE WITH PRESERVED EJECTION FRACTION (HFPEF) (HCC): Chronic | Status: ACTIVE | Noted: 2025-02-21

## 2025-06-19 ENCOUNTER — TELEPHONE (OUTPATIENT)
Dept: SLEEP CENTER | Age: 65
End: 2025-06-19

## 2025-07-07 NOTE — PROGRESS NOTES
[facility]   [employer] [facility] [employer]    Subjective:      Patient ID: Belkis Morton is a 65 y.o. female    HPI    The patient is here for their bariatric surgery presurgical visit for future weight loss. She has made several attempts at weight loss in the past without success and now wishes to pursue bariatric surgery. She is working to change her dietary behaviors and lose weight to improve comorbid conditions such as hypertension, diabetes mellitus, hyperlipidemia and GERD.    Belkis Morton is a 65 y.o. female with Body mass index is 51.65 kg/m².    Past Medical History:   Diagnosis Date    Anxiety     Arthritis     Asthma     as child    Bilateral chronic knee pain     sees pain management    CAD (coronary artery disease)     Degenerative disc disease, cervical MRI 2011    Multilevel cervical degenerative this disease most significant at the C7-T1 level where there is probable impingement of the exiting right C8 nerve root caused by disc herniation.    Depression     Diabetes     type 2    High blood pressure     Hypertriglyceridemia     Lumbar herniated disc MRI 2011    L5-S1    Morbid obesity (HCC)     Opiate dependence (HCC)     Osteomyelitis (HCC)     Pneumonia     as child    Presbyopia     Sleep apnea     STEMI (ST elevation myocardial infarction) (HCC) 01/08/2024     Past Surgical History:   Procedure Laterality Date    CARDIAC CATHETERIZATION  11/2017    1.  Diffusely diseased distal LAD.  Distal LAD has a 50% narrowing in two ---    COLONOSCOPY N/A 8/15/2024    COLONOSCOPY DIAGNOSTIC performed by Jesus Lou MD at [facility] ENDOSCOPY    CORONARY STENT PLACEMENT  01/08/2024    EYE SURGERY Bilateral     cataract    UPPER GASTROINTESTINAL ENDOSCOPY N/A 5/2/2025    ESOPHAGOGASTRODUODENOSCOPY BIOPSY performed by Herman Pacheco MD at [facility] ENDOSCOPY     Family History   Problem Relation Age of Onset    Lung Cancer Mother 50        + tob    No Known Problems Father     No Known

## 2025-07-07 NOTE — PATIENT INSTRUCTIONS
Goals in preparing for bariatric surgery  You should be giving up all beverages that have carbonation, sugar, and caffeine (Refer to the approved liquids list provided at initial visit).  You should be drinking 64 ounces of low calorie (5 calories or less per serving) fluids per day. Suggestions include:  Water (you may add fresh lemon or lime)  Crystal Light  [place] Liquid Water Enhancer  Propel Zero  Powerade Zero/Gatorade Zero  Isopure  Iwrzo2K  SOBE Lifewater Zero  Vitamin Water Zero  Sugar Free Pritesh-Aid  You should be eating 4-6 times per day.  Three small meals plus 1-2 snacks per day is your goal. This balances your calories and nutrients evenly throughout the day and helps to boost your metabolism. Refer to the snack list provided at your initial visit. Aim for a protein at every snack, plus a fruit, vegetable or starch.  You should be eating protein at every meal and snack.  Protein is typically found in animal sources, i.e. chicken, lean beef, lean pork, fish, seafood and eggs. It is also found in low-fat dairy sources such as skim or 1% milk, low-fat yogurt, low-fat cheese, and low-fat cottage cheese. Plant based sources of protein include peanut butter, beans, and soy.  You should be utilizing the 9-inch plate method.  Eating on a smaller plate will help you control portion size, but what you put on your plate counts also. Make ¼ of your plate lean protein, ¼ carbohydrate (fruit, grain or starchy vegetables) and ½ the plate non-starchy vegetables.  You should eliminate caffeine.  Caffeine is dehydrating. After surgery, it's very important to stay hydrated. Giving up caffeine before surgery will help you focus on the changes necessary to be successful after surgery. There are many decaffeinated coffee and tea products available in grocery stores.    You should eliminate alcohol.  You must abstain from alcohol prior to surgery and for at least the first 6-9 months after surgery. Although you may have alcohol

## 2025-07-14 ENCOUNTER — OFFICE VISIT (OUTPATIENT)
Dept: BARIATRICS/WEIGHT MGMT | Age: 65
End: 2025-07-14
Payer: MEDICARE

## 2025-07-14 VITALS
DIASTOLIC BLOOD PRESSURE: 76 MMHG | BODY MASS INDEX: 47.09 KG/M2 | HEIGHT: 66 IN | WEIGHT: 293 LBS | HEART RATE: 87 BPM | SYSTOLIC BLOOD PRESSURE: 139 MMHG

## 2025-07-14 DIAGNOSIS — I10 ESSENTIAL HYPERTENSION: Chronic | ICD-10-CM

## 2025-07-14 DIAGNOSIS — E11.69 TYPE 2 DIABETES MELLITUS WITH OBESITY (HCC): Chronic | ICD-10-CM

## 2025-07-14 DIAGNOSIS — E66.01 MORBID OBESITY WITH BMI OF 50.0-59.9, ADULT (HCC): ICD-10-CM

## 2025-07-14 DIAGNOSIS — E66.9 TYPE 2 DIABETES MELLITUS WITH OBESITY (HCC): Chronic | ICD-10-CM

## 2025-07-14 DIAGNOSIS — K21.9 CHRONIC GERD: Primary | ICD-10-CM

## 2025-07-14 DIAGNOSIS — E78.2 MIXED HYPERLIPIDEMIA: ICD-10-CM

## 2025-07-14 PROCEDURE — 1123F ACP DISCUSS/DSCN MKR DOCD: CPT | Performed by: NURSE PRACTITIONER

## 2025-07-14 PROCEDURE — 99214 OFFICE O/P EST MOD 30 MIN: CPT | Performed by: NURSE PRACTITIONER

## 2025-07-14 PROCEDURE — 1160F RVW MEDS BY RX/DR IN RCRD: CPT | Performed by: NURSE PRACTITIONER

## 2025-07-14 PROCEDURE — G8427 DOCREV CUR MEDS BY ELIG CLIN: HCPCS | Performed by: NURSE PRACTITIONER

## 2025-07-14 PROCEDURE — 3044F HG A1C LEVEL LT 7.0%: CPT | Performed by: NURSE PRACTITIONER

## 2025-07-14 PROCEDURE — 1036F TOBACCO NON-USER: CPT | Performed by: NURSE PRACTITIONER

## 2025-07-14 PROCEDURE — 3075F SYST BP GE 130 - 139MM HG: CPT | Performed by: NURSE PRACTITIONER

## 2025-07-14 PROCEDURE — G8417 CALC BMI ABV UP PARAM F/U: HCPCS | Performed by: NURSE PRACTITIONER

## 2025-07-14 PROCEDURE — 1090F PRES/ABSN URINE INCON ASSESS: CPT | Performed by: NURSE PRACTITIONER

## 2025-07-14 PROCEDURE — 3017F COLORECTAL CA SCREEN DOC REV: CPT | Performed by: NURSE PRACTITIONER

## 2025-07-14 PROCEDURE — 3078F DIAST BP <80 MM HG: CPT | Performed by: NURSE PRACTITIONER

## 2025-07-14 PROCEDURE — 2022F DILAT RTA XM EVC RTNOPTHY: CPT | Performed by: NURSE PRACTITIONER

## 2025-07-14 PROCEDURE — 1159F MED LIST DOCD IN RCRD: CPT | Performed by: NURSE PRACTITIONER

## 2025-07-14 PROCEDURE — G8400 PT W/DXA NO RESULTS DOC: HCPCS | Performed by: NURSE PRACTITIONER

## 2025-07-14 RX ORDER — PREGABALIN 300 MG/1
300 CAPSULE ORAL DAILY
COMMUNITY
Start: 2025-06-23

## 2025-07-14 NOTE — PROGRESS NOTES
Belkis Morton gained 12 lbs over 6 weeks. Pt feels wt gain is related to stopping ozempic ~ 4 months ago. She has not made significant changes to food choices or eating routine.    Wake up 8 AM, [facility] hours vary. In office 11 AM- 3 or 4 PM 3 days/week.  Breakfast: 10:30 AM: Bfast burrito or slider sausage/egg OR smoothie w/ fruit/yogurt (nutribullet ~16 oz)  Snack: None   Lunch: None  Snack: None  Dinner: 5-6 PM: Salad OR Chinese - hot and sour soup + chix or beef + rice (makes 2 meals)  Snack: None  Bed around 2-4 AM, wakes up 3-4 times per night. May be up and working at this time.     Is pt consuming smaller portions? no    Is pt consuming at least 64 oz of fluids per day? 10 oz bottle finishes 6/day     Is pt consuming carbonated, caffeinated, or sugary beverages? Diluted juice 2-3 days/week, coffee rarely    Has pt sampled Unjury and/or Nectar protein? Yes, likes 4 flavors    Has patient attended a support group? Completed 6/5 FF    Exercise: limited d/t knee    Plan/Recommendations:   - Eat Bfast/Lunch/Dinner Daily  - Avoid juice  - Aim for 6-8 hours of sleep per night and set eating schedule    Handouts: 1500 kcal PSMP + Jhonatan Cortes RD

## 2025-07-18 ENCOUNTER — HOSPITAL ENCOUNTER (EMERGENCY)
Age: 65
Discharge: HOME | End: 2025-07-18
Attending: EMERGENCY MEDICINE
Payer: MEDICARE

## 2025-07-18 VITALS
TEMPERATURE: 98.6 F | HEART RATE: 90 BPM | SYSTOLIC BLOOD PRESSURE: 196 MMHG | OXYGEN SATURATION: 100 % | DIASTOLIC BLOOD PRESSURE: 106 MMHG | RESPIRATION RATE: 16 BRPM

## 2025-07-18 DIAGNOSIS — R20.2 LEFT HAND PARESTHESIA: Primary | ICD-10-CM

## 2025-07-18 PROCEDURE — 99283 EMERGENCY DEPT VISIT LOW MDM: CPT

## 2025-07-18 PROCEDURE — 6370000000 HC RX 637 (ALT 250 FOR IP): Performed by: EMERGENCY MEDICINE

## 2025-07-18 RX ORDER — PREDNISONE 20 MG/1
60 TABLET ORAL ONCE
Status: COMPLETED | OUTPATIENT
Start: 2025-07-18 | End: 2025-07-18

## 2025-07-18 RX ORDER — IBUPROFEN 600 MG/1
600 TABLET, FILM COATED ORAL 4 TIMES DAILY PRN
Qty: 40 TABLET | Refills: 0 | Status: SHIPPED | OUTPATIENT
Start: 2025-07-18

## 2025-07-18 RX ADMIN — PREDNISONE 60 MG: 20 TABLET ORAL at 19:00

## 2025-07-18 ASSESSMENT — ENCOUNTER SYMPTOMS
ABDOMINAL PAIN: 0
VOMITING: 0
NAUSEA: 0
CHEST TIGHTNESS: 0
DIARRHEA: 0
SHORTNESS OF BREATH: 0

## 2025-07-18 ASSESSMENT — PAIN - FUNCTIONAL ASSESSMENT: PAIN_FUNCTIONAL_ASSESSMENT: 0-10

## 2025-07-18 ASSESSMENT — PAIN DESCRIPTION - LOCATION: LOCATION: HAND

## 2025-07-18 ASSESSMENT — PAIN SCALES - GENERAL: PAINLEVEL_OUTOF10: 1

## 2025-07-18 ASSESSMENT — LIFESTYLE VARIABLES
HOW MANY STANDARD DRINKS CONTAINING ALCOHOL DO YOU HAVE ON A TYPICAL DAY: PATIENT DOES NOT DRINK
HOW OFTEN DO YOU HAVE A DRINK CONTAINING ALCOHOL: NEVER

## 2025-07-18 ASSESSMENT — PAIN DESCRIPTION - DESCRIPTORS: DESCRIPTORS: SHARP

## 2025-07-18 ASSESSMENT — PAIN DESCRIPTION - ORIENTATION: ORIENTATION: LEFT

## 2025-07-18 NOTE — ED PROVIDER NOTES
[facility] EMERGENCY DEPARTMENT  EMERGENCY DEPARTMENT ENCOUNTER        Pt Name: Belkis Morton  MRN: [id number]  Birthdate 1960  Date of evaluation: 7/18/2025  Provider: MORGAN Castellano - [place]  PCP: ANNE-MARIE Bird MD  Note Started: 6:28 PM EDT 7/18/25       I have seen and evaluated this patient with my supervising physician dustin      CHIEF COMPLAINT       Chief Complaint   Patient presents with    Hand Numbness     Patient arrives through triage with complaints of left hand pain and numbness. Patient reports hx of neck pain with radiating numbness to left thumb and pointer finger. Reports that the pain has progressively gotten worse.        HISTORY OF PRESENT ILLNESS: 1 or more Elements     History From: patient  Limitations to history : None    Belkis Morton is a 65 y.o. female who presents to the emergency department with complaint of painful sensation, paresthesia to the left thumb, index, and long finger.  Patient reports that the pain is throbbing-like and does radiate into the wrist.  States that when she is experiencing pain that is severe, made better by her prescribed Percocet that she takes for chronic knee pain.  States that she was seen in the early spring to rule out carpal tunnel, she reports that her symptoms are much worse at this time despite her EMG being negative through UC.  She is right-hand dominant.  He continues to work full-time as a .  Denies injury or trauma.    Denies any headache, fever, lightheadedness, dizziness, visual disturbances.  No chest pain or pressure.  No neck or back pain.  No shortness of breath, cough, or congestion.  No abdominal pain, nausea, vomiting, diarrhea, constipation, or dysuria.  No rash.    Nursing Notes were all reviewed and agreed with or any disagreements were addressed in the HPI.    REVIEW OF SYSTEMS :      Review of Systems   Constitutional:  Negative for activity change, chills and fever.   Respiratory:

## 2025-07-19 NOTE — ED PROVIDER NOTES
[facility] Emergency Department      Pt Name: Belkis Morton  MRN: [id number]  Birthdate 1960  Date of evaluation: 7/18/2025  Provider: DAMIEN YI MD  I personally saw Belkis Morton and made and approved the management plan with the advanced practice provider.  I take responsibility for the patient management.     HPI: Belkis Morton presented with   Chief Complaint   Patient presents with    Hand Numbness     Patient arrives through triage with complaints of left hand pain and numbness. Patient reports hx of neck pain with radiating numbness to left thumb and pointer finger. Reports that the pain has progressively gotten worse.      Belkis Morton has a past medical history of Anxiety, Arthritis, Asthma, Bilateral chronic knee pain, CAD (coronary artery disease), Degenerative disc disease, cervical (MRI 2011), Depression, Diabetes, High blood pressure, Hypertriglyceridemia, Lumbar herniated disc (MRI 2011), Morbid obesity ([facility]), Opiate dependence ([facility]), Osteomyelitis ([facility]), Pneumonia, Presbyopia, Sleep apnea, and STEMI (ST elevation myocardial infarction) ([facility]) (01/08/2024).  She has a past surgical history that includes Cardiac catheterization (11/2017); Coronary stent placement (01/08/2024); eye surgery (Bilateral); Colonoscopy (N/A, 8/15/2024); and Upper gastrointestinal endoscopy (N/A, 5/2/2025).    No current facility-administered medications on file prior to encounter.     Current Outpatient Medications on File Prior to Encounter   Medication Sig Dispense Refill    pregabalin (LYRICA) 300 MG capsule Take 1 capsule by mouth daily.      omeprazole (PRILOSEC) 20 MG delayed release capsule Take 1 capsule by mouth every morning (before breakfast) 90 capsule 1    insulin glargine (BASAGLAR KWIKPEN) 100 UNIT/ML injection pen Inject into the skin nightly 52 units daily      empagliflozin (JARDIANCE) 10 MG tablet Take 1 tablet by mouth daily 30 tablet 5    vitamin D (ERGOCALCIFEROL) 1.25 MG

## 2025-07-21 ENCOUNTER — TELEPHONE (OUTPATIENT)
Dept: ORTHOPEDIC SURGERY | Age: 65
End: 2025-07-21

## 2025-07-21 NOTE — TELEPHONE ENCOUNTER
L/M FOR Belkis Morton reached out regarding referral from [facility] ED for patient's LT Hand  and request for consultation with Dr. Vern Murillo, Izabel Bhatia PA-C, or Dr. Nathanael Murillo.     I asked the patient to call the office to schedule an appointment if they wish to pursue this consultation.     [employer] message sent to Belkis Morton with noted information.

## 2025-07-22 DIAGNOSIS — Z79.4 TYPE 2 DIABETES MELLITUS WITH DIABETIC NEUROPATHY, WITH LONG-TERM CURRENT USE OF INSULIN (HCC): ICD-10-CM

## 2025-07-22 DIAGNOSIS — E11.40 TYPE 2 DIABETES MELLITUS WITH DIABETIC NEUROPATHY, WITH LONG-TERM CURRENT USE OF INSULIN (HCC): ICD-10-CM

## 2025-07-22 RX ORDER — INSULIN GLARGINE 100 [IU]/ML
56 INJECTION, SOLUTION SUBCUTANEOUS NIGHTLY
Qty: 50 ADJUSTABLE DOSE PRE-FILLED PEN SYRINGE | Refills: 0 | Status: SHIPPED | OUTPATIENT
Start: 2025-07-22

## 2025-07-22 NOTE — TELEPHONE ENCOUNTER
Medication: Insulin Glargine    Last Filled: Unknown - listed as historical. Pt says endocrinologist increased dose to 56 units (was 52) but pt says  is the one who is prescribing it - listed in chart previously under . She has been out  x 4 days.    [facility]    Last appt: 4/10/2025   Next appt: Visit date not found    Last OARRS:       2/24/2017     4:58 PM   RX Monitoring   Attestation The Prescription Monitoring Report for this patient was reviewed today.   Periodic Controlled Substance Monitoring Possible medication side effects, risk of tolerance and/or dependence, and alternative treatments discussed;No signs of potential drug abuse or diversion identified.

## 2025-08-05 ENCOUNTER — OFFICE VISIT (OUTPATIENT)
Dept: ORTHOPEDIC SURGERY | Age: 65
End: 2025-08-05
Payer: MEDICARE

## 2025-08-05 ENCOUNTER — TELEPHONE (OUTPATIENT)
Dept: ORTHOPEDIC SURGERY | Age: 65
End: 2025-08-05

## 2025-08-05 VITALS — BODY MASS INDEX: 45.99 KG/M2 | WEIGHT: 293 LBS | RESPIRATION RATE: 16 BRPM | HEIGHT: 67 IN

## 2025-08-05 DIAGNOSIS — M25.531 PAIN IN BOTH WRISTS: Primary | ICD-10-CM

## 2025-08-05 DIAGNOSIS — M25.532 PAIN IN BOTH WRISTS: Primary | ICD-10-CM

## 2025-08-05 DIAGNOSIS — R20.2 PARESTHESIA: ICD-10-CM

## 2025-08-05 PROCEDURE — 1036F TOBACCO NON-USER: CPT | Performed by: STUDENT IN AN ORGANIZED HEALTH CARE EDUCATION/TRAINING PROGRAM

## 2025-08-05 PROCEDURE — 1159F MED LIST DOCD IN RCRD: CPT | Performed by: STUDENT IN AN ORGANIZED HEALTH CARE EDUCATION/TRAINING PROGRAM

## 2025-08-05 PROCEDURE — G8417 CALC BMI ABV UP PARAM F/U: HCPCS | Performed by: STUDENT IN AN ORGANIZED HEALTH CARE EDUCATION/TRAINING PROGRAM

## 2025-08-05 PROCEDURE — 1160F RVW MEDS BY RX/DR IN RCRD: CPT | Performed by: STUDENT IN AN ORGANIZED HEALTH CARE EDUCATION/TRAINING PROGRAM

## 2025-08-05 PROCEDURE — G8400 PT W/DXA NO RESULTS DOC: HCPCS | Performed by: STUDENT IN AN ORGANIZED HEALTH CARE EDUCATION/TRAINING PROGRAM

## 2025-08-05 PROCEDURE — 1090F PRES/ABSN URINE INCON ASSESS: CPT | Performed by: STUDENT IN AN ORGANIZED HEALTH CARE EDUCATION/TRAINING PROGRAM

## 2025-08-05 PROCEDURE — 99204 OFFICE O/P NEW MOD 45 MIN: CPT | Performed by: STUDENT IN AN ORGANIZED HEALTH CARE EDUCATION/TRAINING PROGRAM

## 2025-08-05 PROCEDURE — 3017F COLORECTAL CA SCREEN DOC REV: CPT | Performed by: STUDENT IN AN ORGANIZED HEALTH CARE EDUCATION/TRAINING PROGRAM

## 2025-08-05 PROCEDURE — 1123F ACP DISCUSS/DSCN MKR DOCD: CPT | Performed by: STUDENT IN AN ORGANIZED HEALTH CARE EDUCATION/TRAINING PROGRAM

## 2025-08-05 PROCEDURE — G8427 DOCREV CUR MEDS BY ELIG CLIN: HCPCS | Performed by: STUDENT IN AN ORGANIZED HEALTH CARE EDUCATION/TRAINING PROGRAM

## 2025-08-12 ENCOUNTER — TELEPHONE (OUTPATIENT)
Dept: BARIATRICS/WEIGHT MGMT | Age: 65
End: 2025-08-12

## 2025-08-12 ENCOUNTER — OFFICE VISIT (OUTPATIENT)
Dept: BARIATRICS/WEIGHT MGMT | Age: 65
End: 2025-08-12
Payer: MEDICARE

## 2025-08-12 VITALS
HEIGHT: 66 IN | OXYGEN SATURATION: 98 % | BODY MASS INDEX: 47.09 KG/M2 | HEART RATE: 75 BPM | RESPIRATION RATE: 18 BRPM | SYSTOLIC BLOOD PRESSURE: 145 MMHG | DIASTOLIC BLOOD PRESSURE: 87 MMHG | WEIGHT: 293 LBS

## 2025-08-12 DIAGNOSIS — K21.9 CHRONIC GERD: ICD-10-CM

## 2025-08-12 DIAGNOSIS — E11.69 TYPE 2 DIABETES MELLITUS WITH OBESITY (HCC): Chronic | ICD-10-CM

## 2025-08-12 DIAGNOSIS — E66.9 TYPE 2 DIABETES MELLITUS WITH OBESITY (HCC): Chronic | ICD-10-CM

## 2025-08-12 DIAGNOSIS — E78.2 MIXED HYPERLIPIDEMIA: ICD-10-CM

## 2025-08-12 DIAGNOSIS — I50.22 CHRONIC SYSTOLIC (CONGESTIVE) HEART FAILURE (HCC): Chronic | ICD-10-CM

## 2025-08-12 DIAGNOSIS — R29.818 SUSPECTED SLEEP APNEA: ICD-10-CM

## 2025-08-12 DIAGNOSIS — I50.32 CHRONIC HEART FAILURE WITH PRESERVED EJECTION FRACTION (HFPEF) (HCC): Chronic | ICD-10-CM

## 2025-08-12 DIAGNOSIS — K44.9 HIATAL HERNIA: Primary | ICD-10-CM

## 2025-08-12 DIAGNOSIS — I10 ESSENTIAL HYPERTENSION: Chronic | ICD-10-CM

## 2025-08-12 PROCEDURE — 99214 OFFICE O/P EST MOD 30 MIN: CPT | Performed by: SURGERY

## 2025-08-12 PROCEDURE — 3017F COLORECTAL CA SCREEN DOC REV: CPT | Performed by: SURGERY

## 2025-08-12 PROCEDURE — 3044F HG A1C LEVEL LT 7.0%: CPT | Performed by: SURGERY

## 2025-08-12 PROCEDURE — G8427 DOCREV CUR MEDS BY ELIG CLIN: HCPCS | Performed by: SURGERY

## 2025-08-12 PROCEDURE — 1123F ACP DISCUSS/DSCN MKR DOCD: CPT | Performed by: SURGERY

## 2025-08-12 PROCEDURE — G8400 PT W/DXA NO RESULTS DOC: HCPCS | Performed by: SURGERY

## 2025-08-12 PROCEDURE — 2022F DILAT RTA XM EVC RTNOPTHY: CPT | Performed by: SURGERY

## 2025-08-12 PROCEDURE — 3077F SYST BP >= 140 MM HG: CPT | Performed by: SURGERY

## 2025-08-12 PROCEDURE — 1159F MED LIST DOCD IN RCRD: CPT | Performed by: SURGERY

## 2025-08-12 PROCEDURE — 1036F TOBACCO NON-USER: CPT | Performed by: SURGERY

## 2025-08-12 PROCEDURE — 3079F DIAST BP 80-89 MM HG: CPT | Performed by: SURGERY

## 2025-08-12 PROCEDURE — G8417 CALC BMI ABV UP PARAM F/U: HCPCS | Performed by: SURGERY

## 2025-08-12 PROCEDURE — 1160F RVW MEDS BY RX/DR IN RCRD: CPT | Performed by: SURGERY

## 2025-08-12 PROCEDURE — G2211 COMPLEX E/M VISIT ADD ON: HCPCS | Performed by: SURGERY

## 2025-08-12 PROCEDURE — 1090F PRES/ABSN URINE INCON ASSESS: CPT | Performed by: SURGERY

## 2025-08-13 PROBLEM — G56.02 LEFT CARPAL TUNNEL SYNDROME: Status: ACTIVE | Noted: 2025-08-13

## 2025-08-18 ENCOUNTER — PATIENT MESSAGE (OUTPATIENT)
Dept: ENDOCRINOLOGY | Age: 65
End: 2025-08-18

## 2025-08-18 DIAGNOSIS — Z79.4 TYPE 2 DIABETES MELLITUS WITH DIABETIC NEUROPATHY, WITH LONG-TERM CURRENT USE OF INSULIN (HCC): Primary | ICD-10-CM

## 2025-08-18 DIAGNOSIS — E11.40 TYPE 2 DIABETES MELLITUS WITH DIABETIC NEUROPATHY, WITH LONG-TERM CURRENT USE OF INSULIN (HCC): Primary | ICD-10-CM

## 2025-08-18 DIAGNOSIS — Z79.4 TYPE 2 DIABETES MELLITUS WITH DIABETIC NEUROPATHY, WITH LONG-TERM CURRENT USE OF INSULIN (HCC): ICD-10-CM

## 2025-08-18 DIAGNOSIS — E11.40 TYPE 2 DIABETES MELLITUS WITH DIABETIC NEUROPATHY, WITH LONG-TERM CURRENT USE OF INSULIN (HCC): ICD-10-CM

## 2025-08-18 RX ORDER — INSULIN GLARGINE 100 [IU]/ML
INJECTION, SOLUTION SUBCUTANEOUS
Qty: 15 ML | Refills: 1 | Status: SHIPPED | OUTPATIENT
Start: 2025-08-18

## 2025-08-20 ENCOUNTER — TELEPHONE (OUTPATIENT)
Dept: ORTHOPEDIC SURGERY | Age: 65
End: 2025-08-20

## 2025-08-21 ENCOUNTER — TELEPHONE (OUTPATIENT)
Dept: ORTHOPEDIC SURGERY | Age: 65
End: 2025-08-21

## 2025-08-26 ENCOUNTER — OFFICE VISIT (OUTPATIENT)
Dept: CARDIOLOGY CLINIC | Age: 65
End: 2025-08-26
Payer: MEDICARE

## 2025-08-26 VITALS
WEIGHT: 293 LBS | HEART RATE: 77 BPM | OXYGEN SATURATION: 97 % | BODY MASS INDEX: 47.09 KG/M2 | HEIGHT: 66 IN | DIASTOLIC BLOOD PRESSURE: 80 MMHG | SYSTOLIC BLOOD PRESSURE: 132 MMHG

## 2025-08-26 DIAGNOSIS — I28.8 DILATED PULMONARY TRUNK (HCC): ICD-10-CM

## 2025-08-26 DIAGNOSIS — I25.10 CORONARY ARTERY DISEASE INVOLVING NATIVE CORONARY ARTERY OF NATIVE HEART WITHOUT ANGINA PECTORIS: Chronic | ICD-10-CM

## 2025-08-26 DIAGNOSIS — I50.32 CHRONIC HEART FAILURE WITH PRESERVED EJECTION FRACTION (HFPEF) (HCC): Chronic | ICD-10-CM

## 2025-08-26 DIAGNOSIS — R29.818 SUSPECTED SLEEP APNEA: ICD-10-CM

## 2025-08-26 DIAGNOSIS — Z01.810 ENCOUNTER FOR PREOPERATIVE ASSESSMENT FOR NONCORONARY CARDIAC SURGERY: ICD-10-CM

## 2025-08-26 DIAGNOSIS — Z79.4 DIABETES MELLITUS DUE TO UNDERLYING CONDITION WITH DIABETIC NEPHROPATHY, WITH LONG-TERM CURRENT USE OF INSULIN (HCC): ICD-10-CM

## 2025-08-26 DIAGNOSIS — I10 ESSENTIAL HYPERTENSION: Primary | Chronic | ICD-10-CM

## 2025-08-26 DIAGNOSIS — E08.21 DIABETES MELLITUS DUE TO UNDERLYING CONDITION WITH DIABETIC NEPHROPATHY, WITH LONG-TERM CURRENT USE OF INSULIN (HCC): ICD-10-CM

## 2025-08-26 PROCEDURE — 1090F PRES/ABSN URINE INCON ASSESS: CPT | Performed by: INTERNAL MEDICINE

## 2025-08-26 PROCEDURE — 1159F MED LIST DOCD IN RCRD: CPT | Performed by: INTERNAL MEDICINE

## 2025-08-26 PROCEDURE — 3079F DIAST BP 80-89 MM HG: CPT | Performed by: INTERNAL MEDICINE

## 2025-08-26 PROCEDURE — G8400 PT W/DXA NO RESULTS DOC: HCPCS | Performed by: INTERNAL MEDICINE

## 2025-08-26 PROCEDURE — 1036F TOBACCO NON-USER: CPT | Performed by: INTERNAL MEDICINE

## 2025-08-26 PROCEDURE — 99214 OFFICE O/P EST MOD 30 MIN: CPT | Performed by: INTERNAL MEDICINE

## 2025-08-26 PROCEDURE — 1123F ACP DISCUSS/DSCN MKR DOCD: CPT | Performed by: INTERNAL MEDICINE

## 2025-08-26 PROCEDURE — G8427 DOCREV CUR MEDS BY ELIG CLIN: HCPCS | Performed by: INTERNAL MEDICINE

## 2025-08-26 PROCEDURE — G8417 CALC BMI ABV UP PARAM F/U: HCPCS | Performed by: INTERNAL MEDICINE

## 2025-08-26 PROCEDURE — 3017F COLORECTAL CA SCREEN DOC REV: CPT | Performed by: INTERNAL MEDICINE

## 2025-08-26 PROCEDURE — 3075F SYST BP GE 130 - 139MM HG: CPT | Performed by: INTERNAL MEDICINE

## 2025-08-26 RX ORDER — TORSEMIDE 20 MG/1
40 TABLET ORAL DAILY
Qty: 60 TABLET | Refills: 3 | Status: SHIPPED | OUTPATIENT
Start: 2025-08-26

## 2025-08-28 ENCOUNTER — TELEPHONE (OUTPATIENT)
Dept: ORTHOPEDIC SURGERY | Age: 65
End: 2025-08-28

## 2025-08-28 ENCOUNTER — OFFICE VISIT (OUTPATIENT)
Dept: ENDOCRINOLOGY | Age: 65
End: 2025-08-28
Payer: MEDICARE

## 2025-08-28 VITALS
WEIGHT: 293 LBS | HEART RATE: 95 BPM | HEIGHT: 66 IN | SYSTOLIC BLOOD PRESSURE: 130 MMHG | DIASTOLIC BLOOD PRESSURE: 76 MMHG | BODY MASS INDEX: 47.09 KG/M2

## 2025-08-28 DIAGNOSIS — E11.65 TYPE 2 DIABETES MELLITUS WITH HYPERGLYCEMIA, WITH LONG-TERM CURRENT USE OF INSULIN (HCC): Primary | ICD-10-CM

## 2025-08-28 DIAGNOSIS — Z79.4 TYPE 2 DIABETES MELLITUS WITH HYPERGLYCEMIA, WITH LONG-TERM CURRENT USE OF INSULIN (HCC): Primary | ICD-10-CM

## 2025-08-28 DIAGNOSIS — E55.9 HYPOVITAMINOSIS D: ICD-10-CM

## 2025-08-28 PROCEDURE — 1123F ACP DISCUSS/DSCN MKR DOCD: CPT | Performed by: INTERNAL MEDICINE

## 2025-08-28 PROCEDURE — 3044F HG A1C LEVEL LT 7.0%: CPT | Performed by: INTERNAL MEDICINE

## 2025-08-28 PROCEDURE — 95251 CONT GLUC MNTR ANALYSIS I&R: CPT | Performed by: INTERNAL MEDICINE

## 2025-08-28 PROCEDURE — G8427 DOCREV CUR MEDS BY ELIG CLIN: HCPCS | Performed by: INTERNAL MEDICINE

## 2025-08-28 PROCEDURE — 1159F MED LIST DOCD IN RCRD: CPT | Performed by: INTERNAL MEDICINE

## 2025-08-28 PROCEDURE — G8400 PT W/DXA NO RESULTS DOC: HCPCS | Performed by: INTERNAL MEDICINE

## 2025-08-28 PROCEDURE — 2022F DILAT RTA XM EVC RTNOPTHY: CPT | Performed by: INTERNAL MEDICINE

## 2025-08-28 PROCEDURE — G8417 CALC BMI ABV UP PARAM F/U: HCPCS | Performed by: INTERNAL MEDICINE

## 2025-08-28 PROCEDURE — 3075F SYST BP GE 130 - 139MM HG: CPT | Performed by: INTERNAL MEDICINE

## 2025-08-28 PROCEDURE — 99214 OFFICE O/P EST MOD 30 MIN: CPT | Performed by: INTERNAL MEDICINE

## 2025-08-28 PROCEDURE — 1036F TOBACCO NON-USER: CPT | Performed by: INTERNAL MEDICINE

## 2025-08-28 PROCEDURE — 3078F DIAST BP <80 MM HG: CPT | Performed by: INTERNAL MEDICINE

## 2025-08-28 PROCEDURE — 3017F COLORECTAL CA SCREEN DOC REV: CPT | Performed by: INTERNAL MEDICINE

## 2025-08-28 PROCEDURE — 1090F PRES/ABSN URINE INCON ASSESS: CPT | Performed by: INTERNAL MEDICINE

## 2025-08-28 RX ORDER — ERGOCALCIFEROL 1.25 MG/1
50000 CAPSULE, LIQUID FILLED ORAL WEEKLY
Qty: 12 CAPSULE | Refills: 1 | Status: SHIPPED | OUTPATIENT
Start: 2025-08-28

## 2025-09-04 ENCOUNTER — CLINICAL DOCUMENTATION (OUTPATIENT)
Dept: BARIATRICS/WEIGHT MGMT | Age: 65
End: 2025-09-04

## (undated) DEVICE — ENDOSCOPIC KIT 6X3/16 FT COLON W/ 1.1 OZ 2 GWN W/O BRSH

## (undated) DEVICE — AIR/WATER CLEANING ADAPTER FOR OLYMPUS® GI ENDOSCOPE: Brand: BULLDOG®

## (undated) DEVICE — BW-412T DISP COMBO CLEANING BRUSH: Brand: SINGLE USE COMBINATION CLEANING BRUSH

## (undated) DEVICE — MOUTHPIECE ENDOSCP L CTRL OPN AND SIDE PORTS DISP

## (undated) DEVICE — FORCEPS BX L240CM WRK CHN 2.8MM STD CAP W/ NDL MIC MESH

## (undated) DEVICE — TUBING IRRIG COMPATIBLE W ERBE MEDIVATOR PMP HYDR

## (undated) DEVICE — CAP WATER BTL AIR TBNG L 16 IN CO2 TBNG L 48 IN ENDOSCP

## (undated) DEVICE — SOLUTION IRRIG 500ML STRL H2O NONPYROGENIC

## (undated) DEVICE — SINGLE USE AIR/WATER, SUCTION AND BIOPSY VALVES SET: Brand: ORCAPOD™